# Patient Record
Sex: FEMALE | Race: WHITE | NOT HISPANIC OR LATINO | Employment: FULL TIME | URBAN - METROPOLITAN AREA
[De-identification: names, ages, dates, MRNs, and addresses within clinical notes are randomized per-mention and may not be internally consistent; named-entity substitution may affect disease eponyms.]

---

## 2017-01-18 ENCOUNTER — TRANSFERRED RECORDS (OUTPATIENT)
Dept: HEALTH INFORMATION MANAGEMENT | Facility: CLINIC | Age: 47
End: 2017-01-18

## 2017-03-21 ENCOUNTER — TRANSFERRED RECORDS (OUTPATIENT)
Dept: HEALTH INFORMATION MANAGEMENT | Facility: CLINIC | Age: 47
End: 2017-03-21

## 2017-04-19 ENCOUNTER — TRANSFERRED RECORDS (OUTPATIENT)
Dept: HEALTH INFORMATION MANAGEMENT | Facility: CLINIC | Age: 47
End: 2017-04-19

## 2017-08-03 ENCOUNTER — THERAPY VISIT (OUTPATIENT)
Dept: PHYSICAL THERAPY | Facility: CLINIC | Age: 47
End: 2017-08-03
Payer: COMMERCIAL

## 2017-08-03 DIAGNOSIS — Z47.89 AFTERCARE FOLLOWING SURGERY OF THE MUSCULOSKELETAL SYSTEM: ICD-10-CM

## 2017-08-03 DIAGNOSIS — M25.562 LEFT KNEE PAIN: Primary | ICD-10-CM

## 2017-08-03 PROCEDURE — 97110 THERAPEUTIC EXERCISES: CPT | Mod: GP | Performed by: PHYSICAL THERAPIST

## 2017-08-03 PROCEDURE — 97161 PT EVAL LOW COMPLEX 20 MIN: CPT | Mod: GP | Performed by: PHYSICAL THERAPIST

## 2017-08-03 ASSESSMENT — ACTIVITIES OF DAILY LIVING (ADL)
GIVING WAY, BUCKLING OR SHIFTING OF KNEE: THE SYMPTOM AFFECTS MY ACTIVITY MODERATELY
AS_A_RESULT_OF_YOUR_KNEE_INJURY,_HOW_WOULD_YOU_RATE_YOUR_CURRENT_LEVEL_OF_DAILY_ACTIVITY?: ABNORMAL
SWELLING: THE SYMPTOM AFFECTS MY ACTIVITY SLIGHTLY
KNEEL ON THE FRONT OF YOUR KNEE: ACTIVITY IS FAIRLY DIFFICULT
SIT WITH YOUR KNEE BENT: ACTIVITY IS FAIRLY DIFFICULT
WALK: ACTIVITY IS SOMEWHAT DIFFICULT
LIMPING: THE SYMPTOM AFFECTS MY ACTIVITY MODERATELY
PAIN: THE SYMPTOM AFFECTS MY ACTIVITY MODERATELY
GO DOWN STAIRS: ACTIVITY IS SOMEWHAT DIFFICULT
KNEE_ACTIVITY_OF_DAILY_LIVING_SCORE: 50
HOW_WOULD_YOU_RATE_THE_CURRENT_FUNCTION_OF_YOUR_KNEE_DURING_YOUR_USUAL_DAILY_ACTIVITIES_ON_A_SCALE_FROM_0_TO_100_WITH_100_BEING_YOUR_LEVEL_OF_KNEE_FUNCTION_PRIOR_TO_YOUR_INJURY_AND_0_BEING_THE_INABILITY_TO_PERFORM_ANY_OF_YOUR_USUAL_DAILY_ACTIVITIES?: 60
STAND: ACTIVITY IS MINIMALLY DIFFICULT
GO UP STAIRS: ACTIVITY IS SOMEWHAT DIFFICULT
RISE FROM A CHAIR: ACTIVITY IS SOMEWHAT DIFFICULT
WEAKNESS: THE SYMPTOM AFFECTS MY ACTIVITY MODERATELY
STIFFNESS: THE SYMPTOM AFFECTS MY ACTIVITY MODERATELY
RAW_SCORE: 35
HOW_WOULD_YOU_RATE_THE_OVERALL_FUNCTION_OF_YOUR_KNEE_DURING_YOUR_USUAL_DAILY_ACTIVITIES?: ABNORMAL
SQUAT: ACTIVITY IS FAIRLY DIFFICULT
KNEE_ACTIVITY_OF_DAILY_LIVING_SUM: 35

## 2017-08-03 NOTE — PROGRESS NOTES
Subjective:    Patient is a 47 year old female presenting with rehab left ankle/foot hpi.                                      Pertinent medical history includes:  Smoking.  Medical allergies: Zithromax.    Current medications:  Anti-depressants.  Current occupation is Student nutrition.    Primary job tasks include:  Prolonged standing and lifting (carrying, pushing, pulling).                                Objective:    System    Physical Exam    General     ROS    Assessment/Plan:

## 2017-08-03 NOTE — LETTER
"[facility]  [address]  [facility] [address]  [phone]    August 3, 2017    Re: Kacy Herrera   :   1970  MRN:  [id number]   REFERRING PHYSICIAN:   Josh Moseley    [facility]  Date of Initial Evaluation:  8/3/2017  Visits:  Rxs Used: 1  Reason for Referral:     Left knee pain  Aftercare following surgery of the musculoskeletal system    EVALUATION SUMMARY  Subjective:  Patient is a 47 year old female presenting with rehab left knee hpi. The history is provided by the patient. No  was used.   Kacy Herrera is a 47 year old female with a left knee condition.  Condition occurred with:  Insidious onset.  Condition occurred: for unknown reasons.  This is a new condition  S/P Left scope on 2017 for lateral meniscus debridement.  S/P medial compartment knee replacement a year ago.  Knee pain never resolved following replacement.  \"I haven't slept in about a year\".  Patient c/o impaired ambulation on levels and stairs .    Patient reports pain:  Anterior and medial.    Pain is described as burning (throbbing) and is constant and reported as 6/10.  Associated symptoms:  Loss of motion/stiffness, loss of strength and edema. Pain is the same all the time.  Symptoms are exacerbated by activity and relieved by ice and rest.  Since onset symptoms are unchanged.  Special tests:  CT scan and x-ray (prior to surgery).  Previous treatment includes physical therapy (following replacement).  There was moderate improvement following previous treatment.  General health as reported by patient is good.  Pertinent medical history includes:  Smoking.  Medical allergies: yes.  Other surgeries include:  Orthopedic surgery.  Current medications:  Anti-depressants.  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  Primary job tasks include:  Prolonged standing.  Barriers include:  None " as reported by the patient.  Red flags:  None as reported by the patient.  Pertinent medical history includes:  Smoking.  Medical allergies: Zithromax.    Current medications:  Anti-depressants.  Current occupation is Student nutrition.    Primary job tasks include:  Prolonged standing and lifting (carrying, pushing, pulling)    Objective:  Gait:    Gait Type:  Antalgic   Assistive Devices:  None  Deviations:  Knee:  Knee flexion decr L and knee extension decr L               Re: Kacy Herrera   :   1970         Knee Evaluation:  ROM:    AROM  Extension: Left: 10    Right:   Flexion: Left: 85   Right:    PROM  Extension: Left: <5   Right:   Flexion: Left: 95   Right:     Strength:   Extension:  Left: 4-/5    Pain:+        Flexion:  Left: 4-/5    Pain:+/-        Quad Set Left: Fair    Pain:     Edema:  Edema of the knee: 2+/5.  Assessment/Plan:    Patient is a 47 year old female with left side knee complaints.    Patient has the following significant findings with corresponding treatment plan.                Diagnosis 1:  S/P Knee Scope - Previous partial knee replacement  Pain -  self management, education and home program  Decreased ROM/flexibility - therapeutic exercise, therapeutic activity and home program  Decreased strength - therapeutic exercise, therapeutic activities and home program  Edema - cold therapy and self management/home program  Impaired gait - gait training and home program  Impaired muscle performance - neuro re-education and home program  Decreased function - therapeutic activities and home program    Therapy Evaluation Codes:   1) History comprised of:   Personal factors that impact the plan of care:      None.    Comorbidity factors that impact the plan of care are:      Smoking.     Medications impacting care: Anti-depressant.  2) Examination of Body Systems comprised of:   Body structures and functions that impact the plan of care:      Knee.   Activity limitations that impact  the plan of care are:      Lifting, Squatting/kneeling, Stairs, Standing, Walking and Working.  3) Clinical presentation characteristics are:   Stable/Uncomplicated.  4) Decision-Making    Low complexity using standardized patient assessment instrument and/or measureable assessment of functional outcome.  Cumulative Therapy Evaluation is: Low complexity.    Previous and current functional limitations:  (See Goal Flow Sheet for this information)    Short term and Long term goals: (See Goal Flow Sheet for this information)     Re: Kacy Herrera   :   1970    Communication ability:  Patient appears to be able to clearly communicate and understand verbal and written communication and follow directions correctly.  Treatment Explanation - The following has been discussed with the patient:   RX ordered/plan of care  Anticipated outcomes  Possible risks and side effects  This patient would benefit from PT intervention to resume normal activities.   Rehab potential is good.    Frequency:  2 X week, once daily  Duration:  for 2 weeks tapering to 1 X a week over 6 weeks  Discharge Plan:  Achieve all LTG.  Independent in home treatment program.  Reach maximal therapeutic benefit.                Thank you for your referral.    INQUIRIES  Therapist: Sanket Samuel, PT   [facility] [place]  [address]  [facility] [address]  Phone: [phone]  Fax: [phone]

## 2017-08-03 NOTE — PROGRESS NOTES
"Subjective:    Patient is a 47 year old female presenting with rehab left knee hpi. The history is provided by the patient. No  was used.   Kacy Herrera is a 47 year old female with a left knee condition.  Condition occurred with:  Insidious onset.  Condition occurred: for unknown reasons.  This is a new condition  S/P Left scope on 7/26/2017 for lateral meniscus debridement.  S/P medial compartment knee replacement a year ago.  Knee pain never resolved following replacement.  \"I haven't slept in about a year\".  Patient c/o impaired ambulation on levels and stairs .    Patient reports pain:  Anterior and medial.    Pain is described as burning (throbbing) and is constant and reported as 6/10.  Associated symptoms:  Loss of motion/stiffness, loss of strength and edema. Pain is the same all the time.  Symptoms are exacerbated by activity and relieved by ice and rest.  Since onset symptoms are unchanged.  Special tests:  CT scan and x-ray (prior to surgery).  Previous treatment includes physical therapy (following replacement).  There was moderate improvement following previous treatment.  General health as reported by patient is good.  Pertinent medical history includes:  Smoking.  Medical allergies: yes.  Other surgeries include:  Orthopedic surgery.  Current medications:  Anti-depressants.  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  Primary job tasks include:  Prolonged standing.    Barriers include:  None as reported by the patient.    Red flags:  None as reported by the patient.                        Objective:      Gait:    Gait Type:  Antalgic   Assistive Devices:  None  Deviations:  Knee:  Knee flexion decr L and knee extension decr L                                                      Knee Evaluation:  ROM:    AROM      Extension: Left: 10    Right:   Flexion: Left: 85   Right:  PROM      Extension: Left: <5   Right:   Flexion: " Left: 95   Right:       Strength:     Extension:  Left: 4-/5    Pain:+        Flexion:  Left: 4-/5    Pain:+/-        Quad Set Left: Fair    Pain:           Edema:  Edema of the knee: 2+/5.            General     ROS    Assessment/Plan:      Patient is a 47 year old female with left side knee complaints.    Patient has the following significant findings with corresponding treatment plan.                Diagnosis 1:  S/P Knee Scope - Previous partial knee replacement  Pain -  self management, education and home program  Decreased ROM/flexibility - therapeutic exercise, therapeutic activity and home program  Decreased strength - therapeutic exercise, therapeutic activities and home program  Edema - cold therapy and self management/home program  Impaired gait - gait training and home program  Impaired muscle performance - neuro re-education and home program  Decreased function - therapeutic activities and home program    Therapy Evaluation Codes:   1) History comprised of:   Personal factors that impact the plan of care:      None.    Comorbidity factors that impact the plan of care are:      Smoking.     Medications impacting care: Anti-depressant.  2) Examination of Body Systems comprised of:   Body structures and functions that impact the plan of care:      Knee.   Activity limitations that impact the plan of care are:      Lifting, Squatting/kneeling, Stairs, Standing, Walking and Working.  3) Clinical presentation characteristics are:   Stable/Uncomplicated.  4) Decision-Making    Low complexity using standardized patient assessment instrument and/or measureable assessment of functional outcome.  Cumulative Therapy Evaluation is: Low complexity.    Previous and current functional limitations:  (See Goal Flow Sheet for this information)    Short term and Long term goals: (See Goal Flow Sheet for this information)     Communication ability:  Patient appears to be able to clearly communicate and understand verbal and  written communication and follow directions correctly.  Treatment Explanation - The following has been discussed with the patient:   RX ordered/plan of care  Anticipated outcomes  Possible risks and side effects  This patient would benefit from PT intervention to resume normal activities.   Rehab potential is good.    Frequency:  2 X week, once daily  Duration:  for 2 weeks tapering to 1 X a week over 6 weeks  Discharge Plan:  Achieve all LTG.  Independent in home treatment program.  Reach maximal therapeutic benefit.    Please refer to the daily flowsheet for treatment today, total treatment time and time spent performing 1:1 timed codes.

## 2017-08-08 ENCOUNTER — THERAPY VISIT (OUTPATIENT)
Dept: PHYSICAL THERAPY | Facility: CLINIC | Age: 47
End: 2017-08-08
Payer: COMMERCIAL

## 2017-08-08 DIAGNOSIS — M25.562 LEFT KNEE PAIN: ICD-10-CM

## 2017-08-08 DIAGNOSIS — Z47.89 AFTERCARE FOLLOWING SURGERY OF THE MUSCULOSKELETAL SYSTEM: ICD-10-CM

## 2017-08-08 PROCEDURE — 97010 HOT OR COLD PACKS THERAPY: CPT | Mod: GP | Performed by: PHYSICAL THERAPIST

## 2017-08-08 PROCEDURE — 97110 THERAPEUTIC EXERCISES: CPT | Mod: GP | Performed by: PHYSICAL THERAPIST

## 2017-08-17 ENCOUNTER — THERAPY VISIT (OUTPATIENT)
Dept: PHYSICAL THERAPY | Facility: CLINIC | Age: 47
End: 2017-08-17
Payer: COMMERCIAL

## 2017-08-17 DIAGNOSIS — M25.562 LEFT KNEE PAIN: ICD-10-CM

## 2017-08-17 DIAGNOSIS — Z47.89 AFTERCARE FOLLOWING SURGERY OF THE MUSCULOSKELETAL SYSTEM: ICD-10-CM

## 2017-08-17 PROCEDURE — 97110 THERAPEUTIC EXERCISES: CPT | Mod: GP

## 2017-08-17 PROCEDURE — 97010 HOT OR COLD PACKS THERAPY: CPT | Mod: GP

## 2017-08-21 ENCOUNTER — THERAPY VISIT (OUTPATIENT)
Dept: PHYSICAL THERAPY | Facility: CLINIC | Age: 47
End: 2017-08-21
Payer: COMMERCIAL

## 2017-08-21 DIAGNOSIS — Z47.89 AFTERCARE FOLLOWING SURGERY OF THE MUSCULOSKELETAL SYSTEM: ICD-10-CM

## 2017-08-21 DIAGNOSIS — M25.562 LEFT KNEE PAIN: ICD-10-CM

## 2017-08-21 PROCEDURE — 97110 THERAPEUTIC EXERCISES: CPT | Mod: GP

## 2017-08-21 PROCEDURE — 97010 HOT OR COLD PACKS THERAPY: CPT | Mod: GP

## 2017-08-23 ENCOUNTER — THERAPY VISIT (OUTPATIENT)
Dept: PHYSICAL THERAPY | Facility: CLINIC | Age: 47
End: 2017-08-23
Payer: COMMERCIAL

## 2017-08-23 DIAGNOSIS — Z47.89 AFTERCARE FOLLOWING SURGERY OF THE MUSCULOSKELETAL SYSTEM: ICD-10-CM

## 2017-08-23 DIAGNOSIS — M25.562 LEFT KNEE PAIN: ICD-10-CM

## 2017-08-23 PROCEDURE — 97010 HOT OR COLD PACKS THERAPY: CPT | Mod: GP | Performed by: PHYSICAL THERAPIST

## 2017-08-23 PROCEDURE — 97110 THERAPEUTIC EXERCISES: CPT | Mod: GP | Performed by: PHYSICAL THERAPIST

## 2017-08-28 ENCOUNTER — THERAPY VISIT (OUTPATIENT)
Dept: PHYSICAL THERAPY | Facility: CLINIC | Age: 47
End: 2017-08-28
Payer: COMMERCIAL

## 2017-08-28 DIAGNOSIS — Z47.89 AFTERCARE FOLLOWING SURGERY OF THE MUSCULOSKELETAL SYSTEM: ICD-10-CM

## 2017-08-28 DIAGNOSIS — M25.562 LEFT KNEE PAIN: ICD-10-CM

## 2017-08-28 PROCEDURE — 97110 THERAPEUTIC EXERCISES: CPT | Mod: GP

## 2017-08-28 PROCEDURE — 97010 HOT OR COLD PACKS THERAPY: CPT | Mod: GP

## 2017-08-31 ENCOUNTER — THERAPY VISIT (OUTPATIENT)
Dept: PHYSICAL THERAPY | Facility: CLINIC | Age: 47
End: 2017-08-31
Payer: COMMERCIAL

## 2017-08-31 DIAGNOSIS — M25.562 LEFT KNEE PAIN: ICD-10-CM

## 2017-08-31 DIAGNOSIS — Z47.89 AFTERCARE FOLLOWING SURGERY OF THE MUSCULOSKELETAL SYSTEM: ICD-10-CM

## 2017-08-31 PROCEDURE — 97110 THERAPEUTIC EXERCISES: CPT | Mod: GP | Performed by: PHYSICAL THERAPIST

## 2017-08-31 PROCEDURE — 97010 HOT OR COLD PACKS THERAPY: CPT | Mod: GP | Performed by: PHYSICAL THERAPIST

## 2017-08-31 NOTE — LETTER
[facility]  [address] [facility] [address] [phone]    2017    Re: Kacy Herrera   :   1970  MRN:  [id number]   REFERRING PHYSICIAN:   Josh Moseley    [facility]  Date of Initial Evaluation:  8/3/2017  Visits:  Rxs Used: 7  Reason for Referral:     Left knee pain  Aftercare following surgery of the musculoskeletal system    DISCHARGE REPORT  Progress reporting period is from 8/3/17 to 17 (7 visits).       SUBJECTIVE  Subjective changes noted by patient:  Kacy decided to discontinue PT following her last visit on 17 and would be pursuing a possible TKA.  Her status at that time: Subjective: Knee is the same.  Not having pain down the post thigh anymore, but continues to have LBP.  Doing press-ups as instructed.     Current pain level is NA  .     Previous pain level was  NA  .   Changes in function:  None  Adverse reaction to treatment or activity: None    OBJECTIVE  Changes noted in objective findings:    Objective: Knee pain with step downs.       ASSESSMENT/PLAN  Updated problem list and treatment plan: Diagnosis 1:  S/p left knee arthroscopic exploratory surgery    STG/LTGs have been met or progress has been made towards goals:  None  Assessment of Progress: The patient's condition is unchanged.  Self Management Plans:  Patient has been instructed in a home treatment program.  Kacy continues to require the following intervention to meet STG and LTG's:  PT intervention is no longer required to meet STG/LTG.    Recommendations:  This patient would benefit from further evaluation.  Discharge from PT.              Re: Kacy Herrera   FELICITY:   1970                                Thank you for your referral.    INQUIRIES  Therapist: Sinan Collazo, PT  [facility]  [address] [facility] [address]  Phone: [phone]  Fax: [phone]

## 2017-10-13 ENCOUNTER — RECORDS - HEALTHEAST (OUTPATIENT)
Dept: ADMINISTRATIVE | Facility: OTHER | Age: 47
End: 2017-10-13

## 2017-10-26 ASSESSMENT — MIFFLIN-ST. JEOR: SCORE: 1334.36

## 2017-10-31 ENCOUNTER — ANESTHESIA - HEALTHEAST (OUTPATIENT)
Dept: SURGERY | Facility: CLINIC | Age: 47
End: 2017-10-31

## 2017-11-01 ENCOUNTER — SURGERY - HEALTHEAST (OUTPATIENT)
Dept: SURGERY | Facility: CLINIC | Age: 47
End: 2017-11-01

## 2017-11-01 ENCOUNTER — TRANSFERRED RECORDS (OUTPATIENT)
Dept: HEALTH INFORMATION MANAGEMENT | Facility: CLINIC | Age: 47
End: 2017-11-01

## 2017-11-01 ASSESSMENT — MIFFLIN-ST. JEOR
SCORE: 1361.58
SCORE: 1365.21

## 2017-11-06 ENCOUNTER — THERAPY VISIT (OUTPATIENT)
Dept: PHYSICAL THERAPY | Facility: CLINIC | Age: 47
End: 2017-11-06
Payer: COMMERCIAL

## 2017-11-06 DIAGNOSIS — Z96.652 PRESENCE OF LEFT ARTIFICIAL KNEE JOINT: ICD-10-CM

## 2017-11-06 DIAGNOSIS — Z47.1 AFTERCARE FOLLOWING LEFT KNEE JOINT REPLACEMENT SURGERY: Primary | ICD-10-CM

## 2017-11-06 DIAGNOSIS — Z96.652 AFTERCARE FOLLOWING LEFT KNEE JOINT REPLACEMENT SURGERY: Primary | ICD-10-CM

## 2017-11-06 PROCEDURE — 97161 PT EVAL LOW COMPLEX 20 MIN: CPT | Mod: GP | Performed by: PHYSICAL THERAPIST

## 2017-11-06 PROCEDURE — 97010 HOT OR COLD PACKS THERAPY: CPT | Mod: GP | Performed by: PHYSICAL THERAPIST

## 2017-11-06 PROCEDURE — 97110 THERAPEUTIC EXERCISES: CPT | Mod: GP | Performed by: PHYSICAL THERAPIST

## 2017-11-06 ASSESSMENT — ACTIVITIES OF DAILY LIVING (ADL)
HOW_WOULD_YOU_RATE_THE_OVERALL_FUNCTION_OF_YOUR_KNEE_DURING_YOUR_USUAL_DAILY_ACTIVITIES?: ABNORMAL
KNEE_ACTIVITY_OF_DAILY_LIVING_SCORE: 8.57
LIMPING: THE SYMPTOM PREVENTS ME FROM ALL DAILY ACTIVITIES
SQUAT: I AM UNABLE TO DO THE ACTIVITY
SWELLING: THE SYMPTOM PREVENTS ME FROM ALL DAILY ACTIVITIES
WALK: ACTIVITY IS VERY DIFFICULT
STIFFNESS: THE SYMPTOM PREVENTS ME FROM ALL DAILY ACTIVITIES
GO DOWN STAIRS: I AM UNABLE TO DO THE ACTIVITY
RAW_SCORE: 6
GO UP STAIRS: I AM UNABLE TO DO THE ACTIVITY
STAND: ACTIVITY IS VERY DIFFICULT
AS_A_RESULT_OF_YOUR_KNEE_INJURY,_HOW_WOULD_YOU_RATE_YOUR_CURRENT_LEVEL_OF_DAILY_ACTIVITY?: SEVERELY ABNORMAL
KNEE_ACTIVITY_OF_DAILY_LIVING_SUM: 6
RISE FROM A CHAIR: ACTIVITY IS VERY DIFFICULT
PAIN: THE SYMPTOM PREVENTS ME FROM ALL DAILY ACTIVITIES
SIT WITH YOUR KNEE BENT: ACTIVITY IS VERY DIFFICULT
KNEEL ON THE FRONT OF YOUR KNEE: ACTIVITY IS VERY DIFFICULT
GIVING WAY, BUCKLING OR SHIFTING OF KNEE: THE SYMPTOM AFFECTS MY ACTIVITY SEVERELY
WEAKNESS: THE SYMPTOM PREVENTS ME FROM ALL DAILY ACTIVITIES
HOW_WOULD_YOU_RATE_THE_CURRENT_FUNCTION_OF_YOUR_KNEE_DURING_YOUR_USUAL_DAILY_ACTIVITIES_ON_A_SCALE_FROM_0_TO_100_WITH_100_BEING_YOUR_LEVEL_OF_KNEE_FUNCTION_PRIOR_TO_YOUR_INJURY_AND_0_BEING_THE_INABILITY_TO_PERFORM_ANY_OF_YOUR_USUAL_DAILY_ACTIVITIES?: 95

## 2017-11-06 NOTE — PROGRESS NOTES
Subjective:    Patient is a 47 year old female presenting with rehab left knee hpi.           Pt presents to PT s/p left TKA.  Surgical date of 11/1/17.  Walking with crutches and PWB.  Hx of failed left partial knee replacement..    Patient reports pain:  Posterior, anterior, medial and lateral.    Pain is described as aching and stabbing and is constant and reported as 9/10.  Associated symptoms:  Loss of motion/stiffness, loss of strength and edema. Pain is the same all the time.  Symptoms are exacerbated by activity and transfers and relieved by rest and ice.  Since onset symptoms are unchanged.  Special tests:  MRI and x-ray.  Previous treatment includes physical therapy.  There was no improvement following previous treatment.  General health as reported by patient is good.                  Barriers include:  None as reported by the patient.    Red flags:  None as reported by the patient.                        Objective:    System                                                Knee Evaluation:  ROM:      PROM    Hyperextension: Left: 0    Right:  3  Extension: Left: 10    Right:  0  Flexion: Left: 60    Right:  140      Strength:         Quad Set Left: Poor    Pain:           Edema:  Edema of the knee: Normal level of edema following a TKA.            General     ROS    Assessment/Plan:      Patient is a 47 year old female with left side knee complaints.    Patient has the following significant findings with corresponding treatment plan.                Diagnosis 1:  Left TKA  Pain -  hot/cold therapy, education and home program  Decreased ROM/flexibility - manual therapy, therapeutic exercise and home program  Decreased strength - therapeutic exercise, therapeutic activities and home program    Therapy Evaluation Codes:   1) History comprised of:   Personal factors that impact the plan of care:      None.    Comorbidity factors that impact the plan of care are:      None.     Medications impacting care:  None.  2) Examination of Body Systems comprised of:   Body structures and functions that impact the plan of care:      Knee.   Activity limitations that impact the plan of care are:      Driving, Dressing, Sitting, Squatting/kneeling, Stairs and Walking.  3) Clinical presentation characteristics are:   Stable/Uncomplicated.  4) Decision-Making    Low complexity using standardized patient assessment instrument and/or measureable assessment of functional outcome.  Cumulative Therapy Evaluation is: Low complexity.    Previous and current functional limitations:  (See Goal Flow Sheet for this information)    Short term and Long term goals: (See Goal Flow Sheet for this information)     Communication ability:  Patient appears to be able to clearly communicate and understand verbal and written communication and follow directions correctly.  Treatment Explanation - The following has been discussed with the patient:   RX ordered/plan of care  Anticipated outcomes  Possible risks and side effects  This patient would benefit from PT intervention to resume normal activities.   Rehab potential is good.    Frequency:  4 X week, once daily  Duration:  for 2 weeks tapering to 2 X a week over 4 weeks  Discharge Plan:  Achieve all LTG.  Independent in home treatment program.  Reach maximal therapeutic benefit.    Please refer to the daily flowsheet for treatment today, total treatment time and time spent performing 1:1 timed codes.

## 2017-11-06 NOTE — LETTER
[facility]  [address]  [facility] [address]  [phone]    2017    Re: Kacy HESS Javier BLEVINS:   1970  MRN:  [id number]   REFERRING PHYSICIAN:   Josh Moseley    [facility]  Date of Initial Evaluation:  2017  Visits:  Rxs Used: 1  Reason for Referral:     Aftercare following left knee joint replacement surgery  Presence of left artificial knee joint    EVALUATION SUMMARY    Subjective:  Patient is a 47 year old female presenting with rehab left knee hpi.   Pt presents to PT s/p left TKA.  Surgical date of 17.  Walking with crutches and PWB.  Hx of failed left partial knee replacement..    Patient reports pain:  Posterior, anterior, medial and lateral.    Pain is described as aching and stabbing and is constant and reported as 9/10.  Associated symptoms:  Loss of motion/stiffness, loss of strength and edema. Pain is the same all the time.  Symptoms are exacerbated by activity and transfers and relieved by rest and ice.  Since onset symptoms are unchanged.  Special tests:  MRI and x-ray.  Previous treatment includes physical therapy.  There was no improvement following previous treatment.  General health as reported by patient is good.                Barriers include:  None as reported by the patient.  Red flags:  None as reported by the patient.    Objective:  Knee Evaluation:  ROM:    PROM  Hyperextension: Left: 0    Right:  3  Extension: Left: 10    Right:  0  Flexion: Left: 60    Right:  140    Strength:   Quad Set Left: Poor    Pain:     Edema:  Edema of the knee: Normal level of edema following a TKA.                  Re: Kacy BLEVINS:   1970    Assessment/Plan:    Patient is a 47 year old female with left side knee complaints.    Patient has the following significant findings with corresponding treatment plan.                Diagnosis 1:  Left TKA  Pain -  hot/cold therapy, education and home  program  Decreased ROM/flexibility - manual therapy, therapeutic exercise and home program  Decreased strength - therapeutic exercise, therapeutic activities and home program    Therapy Evaluation Codes:   1) History comprised of:   Personal factors that impact the plan of care:      None.    Comorbidity factors that impact the plan of care are:      None.     Medications impacting care: None.  2) Examination of Body Systems comprised of:   Body structures and functions that impact the plan of care:      Knee.   Activity limitations that impact the plan of care are:      Driving, Dressing, Sitting, Squatting/kneeling, Stairs and Walking.  3) Clinical presentation characteristics are:   Stable/Uncomplicated.  4) Decision-Making    Low complexity using standardized patient assessment instrument and/or measureable assessment of functional outcome.  Cumulative Therapy Evaluation is: Low complexity.    Previous and current functional limitations:  (See Goal Flow Sheet for this information)    Short term and Long term goals: (See Goal Flow Sheet for this information)   Communication ability:  Patient appears to be able to clearly communicate and understand verbal and written communication and follow directions correctly.  Treatment Explanation - The following has been discussed with the patient:   RX ordered/plan of care  Anticipated outcomes  Possible risks and side effects  This patient would benefit from PT intervention to resume normal activities.   Rehab potential is good.  Frequency:  4 X week, once daily  Duration:  for 2 weeks tapering to 2 X a week over 4 weeks  Discharge Plan:  Achieve all LTG.  Independent in home treatment program.  Reach maximal therapeutic benefit.    Thank you for your referral.    INQUIRIES  Therapist: Sinan Collazo, PT  [facility] [place]  [address]  [facility] [address]  Phone: [phone]  Fax: [phone]

## 2017-11-07 ENCOUNTER — THERAPY VISIT (OUTPATIENT)
Dept: PHYSICAL THERAPY | Facility: CLINIC | Age: 47
End: 2017-11-07
Payer: COMMERCIAL

## 2017-11-07 DIAGNOSIS — Z47.1 AFTERCARE FOLLOWING LEFT KNEE JOINT REPLACEMENT SURGERY: ICD-10-CM

## 2017-11-07 DIAGNOSIS — Z96.652 AFTERCARE FOLLOWING LEFT KNEE JOINT REPLACEMENT SURGERY: ICD-10-CM

## 2017-11-07 DIAGNOSIS — Z96.652 PRESENCE OF LEFT ARTIFICIAL KNEE JOINT: ICD-10-CM

## 2017-11-07 PROCEDURE — 97010 HOT OR COLD PACKS THERAPY: CPT | Mod: GP | Performed by: PHYSICAL THERAPIST

## 2017-11-07 PROCEDURE — 97110 THERAPEUTIC EXERCISES: CPT | Mod: GP | Performed by: PHYSICAL THERAPIST

## 2017-11-09 ENCOUNTER — THERAPY VISIT (OUTPATIENT)
Dept: PHYSICAL THERAPY | Facility: CLINIC | Age: 47
End: 2017-11-09
Payer: COMMERCIAL

## 2017-11-09 DIAGNOSIS — Z96.652 PRESENCE OF LEFT ARTIFICIAL KNEE JOINT: ICD-10-CM

## 2017-11-09 DIAGNOSIS — Z96.652 AFTERCARE FOLLOWING LEFT KNEE JOINT REPLACEMENT SURGERY: ICD-10-CM

## 2017-11-09 DIAGNOSIS — Z47.1 AFTERCARE FOLLOWING LEFT KNEE JOINT REPLACEMENT SURGERY: ICD-10-CM

## 2017-11-09 PROCEDURE — 97110 THERAPEUTIC EXERCISES: CPT | Mod: GP | Performed by: PHYSICAL THERAPIST

## 2017-11-09 PROCEDURE — 97010 HOT OR COLD PACKS THERAPY: CPT | Mod: GP | Performed by: PHYSICAL THERAPIST

## 2017-11-10 ENCOUNTER — THERAPY VISIT (OUTPATIENT)
Dept: PHYSICAL THERAPY | Facility: CLINIC | Age: 47
End: 2017-11-10
Payer: COMMERCIAL

## 2017-11-10 DIAGNOSIS — Z47.1 AFTERCARE FOLLOWING LEFT KNEE JOINT REPLACEMENT SURGERY: ICD-10-CM

## 2017-11-10 DIAGNOSIS — Z96.652 PRESENCE OF LEFT ARTIFICIAL KNEE JOINT: ICD-10-CM

## 2017-11-10 DIAGNOSIS — Z96.652 AFTERCARE FOLLOWING LEFT KNEE JOINT REPLACEMENT SURGERY: ICD-10-CM

## 2017-11-10 PROCEDURE — 97010 HOT OR COLD PACKS THERAPY: CPT | Mod: GP | Performed by: PHYSICAL THERAPIST

## 2017-11-10 PROCEDURE — 97110 THERAPEUTIC EXERCISES: CPT | Mod: GP | Performed by: PHYSICAL THERAPIST

## 2017-11-13 ENCOUNTER — THERAPY VISIT (OUTPATIENT)
Dept: PHYSICAL THERAPY | Facility: CLINIC | Age: 47
End: 2017-11-13
Payer: COMMERCIAL

## 2017-11-13 DIAGNOSIS — Z96.652 AFTERCARE FOLLOWING LEFT KNEE JOINT REPLACEMENT SURGERY: ICD-10-CM

## 2017-11-13 DIAGNOSIS — Z96.652 PRESENCE OF LEFT ARTIFICIAL KNEE JOINT: ICD-10-CM

## 2017-11-13 DIAGNOSIS — Z47.1 AFTERCARE FOLLOWING LEFT KNEE JOINT REPLACEMENT SURGERY: ICD-10-CM

## 2017-11-13 PROCEDURE — 97010 HOT OR COLD PACKS THERAPY: CPT | Mod: GP | Performed by: PHYSICAL THERAPIST

## 2017-11-13 PROCEDURE — 97530 THERAPEUTIC ACTIVITIES: CPT | Mod: GP | Performed by: PHYSICAL THERAPIST

## 2017-11-13 PROCEDURE — 97110 THERAPEUTIC EXERCISES: CPT | Mod: GP | Performed by: PHYSICAL THERAPIST

## 2017-11-14 ENCOUNTER — THERAPY VISIT (OUTPATIENT)
Dept: PHYSICAL THERAPY | Facility: CLINIC | Age: 47
End: 2017-11-14
Payer: COMMERCIAL

## 2017-11-14 DIAGNOSIS — Z96.652 PRESENCE OF LEFT ARTIFICIAL KNEE JOINT: ICD-10-CM

## 2017-11-14 DIAGNOSIS — Z47.1 AFTERCARE FOLLOWING LEFT KNEE JOINT REPLACEMENT SURGERY: ICD-10-CM

## 2017-11-14 DIAGNOSIS — Z96.652 AFTERCARE FOLLOWING LEFT KNEE JOINT REPLACEMENT SURGERY: ICD-10-CM

## 2017-11-14 PROCEDURE — 97010 HOT OR COLD PACKS THERAPY: CPT | Mod: GP | Performed by: PHYSICAL THERAPIST

## 2017-11-14 PROCEDURE — 97110 THERAPEUTIC EXERCISES: CPT | Mod: GP | Performed by: PHYSICAL THERAPIST

## 2017-11-14 PROCEDURE — 97530 THERAPEUTIC ACTIVITIES: CPT | Mod: GP | Performed by: PHYSICAL THERAPIST

## 2017-11-16 ENCOUNTER — THERAPY VISIT (OUTPATIENT)
Dept: PHYSICAL THERAPY | Facility: CLINIC | Age: 47
End: 2017-11-16
Payer: COMMERCIAL

## 2017-11-16 DIAGNOSIS — Z96.652 PRESENCE OF LEFT ARTIFICIAL KNEE JOINT: ICD-10-CM

## 2017-11-16 DIAGNOSIS — Z47.1 AFTERCARE FOLLOWING LEFT KNEE JOINT REPLACEMENT SURGERY: ICD-10-CM

## 2017-11-16 DIAGNOSIS — Z96.652 AFTERCARE FOLLOWING LEFT KNEE JOINT REPLACEMENT SURGERY: ICD-10-CM

## 2017-11-16 PROCEDURE — 97110 THERAPEUTIC EXERCISES: CPT | Mod: GP

## 2017-11-16 PROCEDURE — 97010 HOT OR COLD PACKS THERAPY: CPT | Mod: GP

## 2017-11-16 PROCEDURE — 97530 THERAPEUTIC ACTIVITIES: CPT | Mod: GP

## 2017-11-17 ENCOUNTER — THERAPY VISIT (OUTPATIENT)
Dept: PHYSICAL THERAPY | Facility: CLINIC | Age: 47
End: 2017-11-17
Payer: COMMERCIAL

## 2017-11-17 DIAGNOSIS — Z96.652 PRESENCE OF LEFT ARTIFICIAL KNEE JOINT: ICD-10-CM

## 2017-11-17 DIAGNOSIS — Z47.1 AFTERCARE FOLLOWING LEFT KNEE JOINT REPLACEMENT SURGERY: ICD-10-CM

## 2017-11-17 DIAGNOSIS — Z96.652 AFTERCARE FOLLOWING LEFT KNEE JOINT REPLACEMENT SURGERY: ICD-10-CM

## 2017-11-17 PROCEDURE — 97110 THERAPEUTIC EXERCISES: CPT | Mod: GP | Performed by: PHYSICAL THERAPIST

## 2017-11-17 PROCEDURE — 97530 THERAPEUTIC ACTIVITIES: CPT | Mod: GP | Performed by: PHYSICAL THERAPIST

## 2017-11-17 PROCEDURE — 97010 HOT OR COLD PACKS THERAPY: CPT | Mod: GP | Performed by: PHYSICAL THERAPIST

## 2017-11-20 ENCOUNTER — THERAPY VISIT (OUTPATIENT)
Dept: PHYSICAL THERAPY | Facility: CLINIC | Age: 47
End: 2017-11-20
Payer: COMMERCIAL

## 2017-11-20 DIAGNOSIS — Z96.652 PRESENCE OF LEFT ARTIFICIAL KNEE JOINT: ICD-10-CM

## 2017-11-20 DIAGNOSIS — Z47.1 AFTERCARE FOLLOWING LEFT KNEE JOINT REPLACEMENT SURGERY: ICD-10-CM

## 2017-11-20 DIAGNOSIS — Z96.652 AFTERCARE FOLLOWING LEFT KNEE JOINT REPLACEMENT SURGERY: ICD-10-CM

## 2017-11-20 PROCEDURE — 97110 THERAPEUTIC EXERCISES: CPT | Mod: GP

## 2017-11-20 PROCEDURE — 97530 THERAPEUTIC ACTIVITIES: CPT | Mod: GP

## 2017-11-20 PROCEDURE — 97010 HOT OR COLD PACKS THERAPY: CPT | Mod: GP

## 2017-11-20 NOTE — MR AVS SNAPSHOT
After Visit Summary   11/20/2017    Kacy Herrera    MRN: [id number]           Patient Information     Date Of Birth          1970        Visit Information        Provider Department      11/20/2017 9:30 AM Yelitza Jones PTA [facility]        Today's Diagnoses     Aftercare following left knee joint replacement surgery        Presence of left artificial knee joint           Follow-ups after your visit        Your next 10 appointments already scheduled     Nov 22, 2017  4:40 PM CST   GUERO Extremity with Sinan Collazo PT   [facility] ([facility])    [address] [address]   [phone]            Nov 24, 2017 11:30 AM CST   GUERO Extremity with Sinan Collazo PT   [facility] ([facility])    [address] [address]   [phone]            Nov 27, 2017  9:30 AM CST   GUERO Extremity with Sinan Collazo PT   [facility] ([facility])    [address] [address]   [phone]            Nov 29, 2017 10:50 AM CST   GUERO Extremity with Sinan Collazo PT   [facility] ([facility])    [address] [address]   [phone]            Dec 01, 2017  9:30 AM CST   GUERO Extremity with Sinan Collazo PT   [facility] ([facility])    [address] [address]   [phone]              Who to contact     If you have questions or need follow up information about today's clinic visit or your schedule please contact [facility] directly at [phone].  Normal or non-critical lab and imaging results will be communicated to you by MyChart, letter or phone within 4 business days after the clinic has received the results. If you do not hear from us within 7 days, please contact the clinic through MyChart or phone. If you  "have a critical or abnormal lab result, we will notify you by phone as soon as possible.  Submit refill requests through [email or web address] or call your pharmacy and they will forward the refill request to us. Please allow 3 business days for your refill to be completed.          Additional Information About Your Visit        Appwapphart Information     [email or web address] lets you send messages to your doctor, view your test results, renew your prescriptions, schedule appointments and more. To sign up, go to [email or web address] . Click on \"Log in\" on the left side of the screen, which will take you to the Welcome page. Then click on \"Sign up Now\" on the right side of the page.     You will be asked to enter the access code listed below, as well as some personal information. Please follow the directions to create your username and password.     Your access code is: [email or web address]  Expires: 2018  3:21 PM     Your access code will  in 90 days. If you need help or a new code, please call your [facility] clinic or [phone].        Care EveryWhere ID     This is your Care EveryWhere ID. This could be used by other organizations to access your [facility] medical records  [id number]         Blood Pressure from Last 3 Encounters:   16 (!) 152/92    Weight from Last 3 Encounters:   No data found for Wt              We Performed the Following     Hot or Cold Packs Therapy     Therapeutic Activities     Therapeutic Exercises        Primary Care Provider Fax #    [facility] [phone] [address]  [facility] [address]        Equal Access to Services     FRANSICO KIM : Hadii aad ku hadasho Soomaali, waaxda luqadaha, qaybta kaalmada adeegyada, waxay missy villeda. So [facility] [phone].    ATENCIÓN: Si habla español, tiene a de león disposición servicios gratuitos de asistencia lingüística. Llame al [phone].    We comply with applicable federal civil rights laws and [place] laws. " We do not discriminate on the basis of race, color, national origin, age, disability, sex, sexual orientation, or gender identity.            Thank you!     Thank you for choosing [facility]  for your care. Our goal is always to provide you with excellent care. Hearing back from our patients is one way we can continue to improve our services. Please take a few minutes to complete the written survey that you may receive in the mail after your visit with us. Thank you!             Your Updated Medication List - Protect others around you: Learn how to safely use, store and throw away your medicines at www.disposemymeds.org.          This list is accurate as of: 11/20/17 11:54 AM.  Always use your most recent med list.                   Brand Name Dispense Instructions for use Diagnosis    CONCERTA PO      Take by mouth daily        HYDROcodone-acetaminophen 5-325 MG per tablet    NORCO    15 tablet    Take 1-2 tablets by mouth every 4 hours as needed for moderate to severe pain

## 2017-11-20 NOTE — PROGRESS NOTES
Subjective:    HPI                    Objective:    System    Physical Exam    General     ROS    Assessment/Plan:      PROGRESS  REPORT    Progress reporting period is from 11/6/2017 to 11/20/2017.       SUBJECTIVE  Subjective changes noted by patient:  States she has been having significant pain.  She was able to go out shopping for an hour this past weekend.  Performing stairs one at a time.      Changes in function:  Yes (See Goal flowsheet attached for changes in current functional level)  Adverse reaction to treatment or activity: None    OBJECTIVE  Changes noted in objective findings:  Yes,   Objective: PROM 3-0-103, fair quad set, significant pain with extension stretching, 6 inch step up with good control, gait with decreased weight shift and knee extension with heel strike.       ASSESSMENT/PLAN  Updated problem list and treatment plan: Diagnosis 1:  L TKA  Pain -  hot/cold therapy  Decreased ROM/flexibility - manual therapy and therapeutic exercise  Decreased strength - therapeutic exercise and therapeutic activities  STG/LTGs have been met or progress has been made towards goals:  Yes (See Goal flow sheet completed today.)  Assessment of Progress: The patient's condition is improving.  Self Management Plans:  Patient has been instructed in a home treatment program.    Kacy continues to require the following intervention to meet STG and LTG's:  PT    Recommendations:  This patient would benefit from continued therapy.     Frequency: 1- 2 X week, once daily  Duration:  for 6 weeks          Please refer to the daily flowsheet for treatment today, total treatment time and time spent performing 1:1 timed codes.

## 2017-11-22 ENCOUNTER — THERAPY VISIT (OUTPATIENT)
Dept: PHYSICAL THERAPY | Facility: CLINIC | Age: 47
End: 2017-11-22
Payer: COMMERCIAL

## 2017-11-22 DIAGNOSIS — Z96.652 PRESENCE OF LEFT ARTIFICIAL KNEE JOINT: ICD-10-CM

## 2017-11-22 DIAGNOSIS — Z96.652 AFTERCARE FOLLOWING LEFT KNEE JOINT REPLACEMENT SURGERY: ICD-10-CM

## 2017-11-22 DIAGNOSIS — Z47.1 AFTERCARE FOLLOWING LEFT KNEE JOINT REPLACEMENT SURGERY: ICD-10-CM

## 2017-11-22 PROCEDURE — 97010 HOT OR COLD PACKS THERAPY: CPT | Mod: GP | Performed by: PHYSICAL THERAPIST

## 2017-11-22 PROCEDURE — 97110 THERAPEUTIC EXERCISES: CPT | Mod: GP | Performed by: PHYSICAL THERAPIST

## 2017-11-22 PROCEDURE — 97530 THERAPEUTIC ACTIVITIES: CPT | Mod: GP | Performed by: PHYSICAL THERAPIST

## 2017-11-24 ENCOUNTER — THERAPY VISIT (OUTPATIENT)
Dept: PHYSICAL THERAPY | Facility: CLINIC | Age: 47
End: 2017-11-24
Payer: COMMERCIAL

## 2017-11-24 DIAGNOSIS — Z96.652 PRESENCE OF LEFT ARTIFICIAL KNEE JOINT: ICD-10-CM

## 2017-11-24 DIAGNOSIS — Z47.1 AFTERCARE FOLLOWING LEFT KNEE JOINT REPLACEMENT SURGERY: ICD-10-CM

## 2017-11-24 DIAGNOSIS — Z96.652 AFTERCARE FOLLOWING LEFT KNEE JOINT REPLACEMENT SURGERY: ICD-10-CM

## 2017-11-24 PROCEDURE — 97530 THERAPEUTIC ACTIVITIES: CPT | Mod: GP | Performed by: PHYSICAL THERAPIST

## 2017-11-24 PROCEDURE — 97110 THERAPEUTIC EXERCISES: CPT | Mod: GP | Performed by: PHYSICAL THERAPIST

## 2017-11-24 PROCEDURE — 97010 HOT OR COLD PACKS THERAPY: CPT | Mod: GP | Performed by: PHYSICAL THERAPIST

## 2017-11-27 ENCOUNTER — THERAPY VISIT (OUTPATIENT)
Dept: PHYSICAL THERAPY | Facility: CLINIC | Age: 47
End: 2017-11-27
Payer: COMMERCIAL

## 2017-11-27 DIAGNOSIS — Z96.652 PRESENCE OF LEFT ARTIFICIAL KNEE JOINT: ICD-10-CM

## 2017-11-27 DIAGNOSIS — Z47.1 AFTERCARE FOLLOWING LEFT KNEE JOINT REPLACEMENT SURGERY: ICD-10-CM

## 2017-11-27 DIAGNOSIS — Z96.652 AFTERCARE FOLLOWING LEFT KNEE JOINT REPLACEMENT SURGERY: ICD-10-CM

## 2017-11-27 PROCEDURE — 97110 THERAPEUTIC EXERCISES: CPT | Mod: GP | Performed by: PHYSICAL THERAPIST

## 2017-11-27 PROCEDURE — 97530 THERAPEUTIC ACTIVITIES: CPT | Mod: GP | Performed by: PHYSICAL THERAPIST

## 2017-11-27 PROCEDURE — 97010 HOT OR COLD PACKS THERAPY: CPT | Mod: GP | Performed by: PHYSICAL THERAPIST

## 2017-11-28 PROBLEM — Z47.89 AFTERCARE FOLLOWING SURGERY OF THE MUSCULOSKELETAL SYSTEM: Status: RESOLVED | Noted: 2017-08-03 | Resolved: 2017-11-28

## 2017-11-28 PROBLEM — M25.562 LEFT KNEE PAIN: Status: RESOLVED | Noted: 2017-08-03 | Resolved: 2017-11-28

## 2017-11-28 NOTE — PROGRESS NOTES
Subjective:    HPI                    Objective:    System    Physical Exam    General     ROS    Assessment/Plan:      DISCHARGE REPORT    Progress reporting period is from 8/3/17 to 8/31/17 (7 visits).       SUBJECTIVE  Subjective changes noted by patient:  Kacy decided to discontinue PT following her last visit on 8/31/17 and would be pursuing a possible TKA.  Her status at that time: Subjective: Knee is the same.  Not having pain down the post thigh anymore, but continues to have LBP.  Doing press-ups as instructed.     Current pain level is NA  .     Previous pain level was  NA  .   Changes in function:  None  Adverse reaction to treatment or activity: None    OBJECTIVE  Changes noted in objective findings:    Objective: Knee pain with step downs.       ASSESSMENT/PLAN  Updated problem list and treatment plan: Diagnosis 1:  S/p left knee arthroscopic exploratory surgery    STG/LTGs have been met or progress has been made towards goals:  None  Assessment of Progress: The patient's condition is unchanged.  Self Management Plans:  Patient has been instructed in a home treatment program.    Kacy continues to require the following intervention to meet STG and LTG's:  PT intervention is no longer required to meet STG/LTG.    Recommendations:  This patient would benefit from further evaluation.  Discharge from PT.    Please refer to the daily flowsheet for treatment today, total treatment time and time spent performing 1:1 timed codes.

## 2017-11-29 ENCOUNTER — THERAPY VISIT (OUTPATIENT)
Dept: PHYSICAL THERAPY | Facility: CLINIC | Age: 47
End: 2017-11-29
Payer: COMMERCIAL

## 2017-11-29 DIAGNOSIS — Z96.652 PRESENCE OF LEFT ARTIFICIAL KNEE JOINT: ICD-10-CM

## 2017-11-29 DIAGNOSIS — Z47.1 AFTERCARE FOLLOWING LEFT KNEE JOINT REPLACEMENT SURGERY: ICD-10-CM

## 2017-11-29 DIAGNOSIS — Z96.652 AFTERCARE FOLLOWING LEFT KNEE JOINT REPLACEMENT SURGERY: ICD-10-CM

## 2017-11-29 PROCEDURE — 97530 THERAPEUTIC ACTIVITIES: CPT | Mod: GP | Performed by: PHYSICAL THERAPIST

## 2017-11-29 PROCEDURE — 97010 HOT OR COLD PACKS THERAPY: CPT | Mod: GP | Performed by: PHYSICAL THERAPIST

## 2017-11-29 PROCEDURE — 97110 THERAPEUTIC EXERCISES: CPT | Mod: GP | Performed by: PHYSICAL THERAPIST

## 2017-12-01 ENCOUNTER — THERAPY VISIT (OUTPATIENT)
Dept: PHYSICAL THERAPY | Facility: CLINIC | Age: 47
End: 2017-12-01
Payer: COMMERCIAL

## 2017-12-01 DIAGNOSIS — Z96.652 AFTERCARE FOLLOWING LEFT KNEE JOINT REPLACEMENT SURGERY: ICD-10-CM

## 2017-12-01 DIAGNOSIS — Z96.652 PRESENCE OF LEFT ARTIFICIAL KNEE JOINT: ICD-10-CM

## 2017-12-01 DIAGNOSIS — Z47.1 AFTERCARE FOLLOWING LEFT KNEE JOINT REPLACEMENT SURGERY: ICD-10-CM

## 2017-12-01 PROCEDURE — 97530 THERAPEUTIC ACTIVITIES: CPT | Mod: GP | Performed by: PHYSICAL THERAPIST

## 2017-12-01 PROCEDURE — 97010 HOT OR COLD PACKS THERAPY: CPT | Mod: GP | Performed by: PHYSICAL THERAPIST

## 2017-12-01 PROCEDURE — 97110 THERAPEUTIC EXERCISES: CPT | Mod: GP | Performed by: PHYSICAL THERAPIST

## 2017-12-05 ENCOUNTER — THERAPY VISIT (OUTPATIENT)
Dept: PHYSICAL THERAPY | Facility: CLINIC | Age: 47
End: 2017-12-05
Payer: COMMERCIAL

## 2017-12-05 DIAGNOSIS — Z96.652 PRESENCE OF LEFT ARTIFICIAL KNEE JOINT: ICD-10-CM

## 2017-12-05 DIAGNOSIS — Z47.1 AFTERCARE FOLLOWING LEFT KNEE JOINT REPLACEMENT SURGERY: ICD-10-CM

## 2017-12-05 DIAGNOSIS — Z96.652 AFTERCARE FOLLOWING LEFT KNEE JOINT REPLACEMENT SURGERY: ICD-10-CM

## 2017-12-05 PROCEDURE — 97110 THERAPEUTIC EXERCISES: CPT | Mod: GP | Performed by: PHYSICAL THERAPIST

## 2017-12-05 PROCEDURE — 97530 THERAPEUTIC ACTIVITIES: CPT | Mod: GP | Performed by: PHYSICAL THERAPIST

## 2017-12-08 ENCOUNTER — THERAPY VISIT (OUTPATIENT)
Dept: PHYSICAL THERAPY | Facility: CLINIC | Age: 47
End: 2017-12-08
Payer: COMMERCIAL

## 2017-12-08 DIAGNOSIS — Z96.652 PRESENCE OF LEFT ARTIFICIAL KNEE JOINT: ICD-10-CM

## 2017-12-08 DIAGNOSIS — Z96.652 AFTERCARE FOLLOWING LEFT KNEE JOINT REPLACEMENT SURGERY: ICD-10-CM

## 2017-12-08 DIAGNOSIS — Z47.1 AFTERCARE FOLLOWING LEFT KNEE JOINT REPLACEMENT SURGERY: ICD-10-CM

## 2017-12-08 PROCEDURE — 97110 THERAPEUTIC EXERCISES: CPT | Mod: GP | Performed by: PHYSICAL THERAPIST

## 2017-12-08 PROCEDURE — 97530 THERAPEUTIC ACTIVITIES: CPT | Mod: GP | Performed by: PHYSICAL THERAPIST

## 2017-12-08 PROCEDURE — 97010 HOT OR COLD PACKS THERAPY: CPT | Mod: GP | Performed by: PHYSICAL THERAPIST

## 2017-12-12 ENCOUNTER — THERAPY VISIT (OUTPATIENT)
Dept: PHYSICAL THERAPY | Facility: CLINIC | Age: 47
End: 2017-12-12
Payer: COMMERCIAL

## 2017-12-12 DIAGNOSIS — Z96.652 AFTERCARE FOLLOWING LEFT KNEE JOINT REPLACEMENT SURGERY: ICD-10-CM

## 2017-12-12 DIAGNOSIS — Z96.652 PRESENCE OF LEFT ARTIFICIAL KNEE JOINT: ICD-10-CM

## 2017-12-12 DIAGNOSIS — Z47.1 AFTERCARE FOLLOWING LEFT KNEE JOINT REPLACEMENT SURGERY: ICD-10-CM

## 2017-12-12 PROCEDURE — 97530 THERAPEUTIC ACTIVITIES: CPT | Mod: GP | Performed by: PHYSICAL THERAPIST

## 2017-12-12 PROCEDURE — 97010 HOT OR COLD PACKS THERAPY: CPT | Mod: GP | Performed by: PHYSICAL THERAPIST

## 2017-12-12 PROCEDURE — 97110 THERAPEUTIC EXERCISES: CPT | Mod: GP | Performed by: PHYSICAL THERAPIST

## 2017-12-12 NOTE — LETTER
[facility]  [address]  [facility] [address]  [phone]    2017    Re: Kacy Herrera   FELICITY:   1970  MRN:  [id number]   REFERRING PHYSICIAN:   Josh Moseley    [facility]  Date of Initial Evaluation:  2017  Visits:  Rxs Used: 17  Reason for Referral:     Aftercare following left knee joint replacement surgery  Presence of left artificial knee joint    PROGRESS  REPORT  Progress reporting period is from 17  to 17.       SUBJECTIVE  Subjective changes noted by patient:  Subjective: Had her best day since the TKA on .  Was able to walk without the crutch to go grocery shopping and to a hockey game and her knee felt good.  Still not sleeping well as she states that her knee is just not comfortable.  Able to ascend stairs reciprocally.    Current pain level is NA  .     Previous pain level was  NA  .   Changes in function:  Yes (See Goal flowsheet attached for changes in current functional level)  Adverse reaction to treatment or activity: None    OBJECTIVE  Changes noted in objective findings:    Objective: PROM: 0-7-108.  Gait: lacks full extension of knee and has limited knee flexion.       ASSESSMENT/PLAN  Updated problem list and treatment plan: Diagnosis 1:  Left TKA    STG/LTGs have been met or progress has been made towards goals:  Yes (See Goal flow sheet completed today.)  Assessment of Progress: The patient's condition is improving.  Self Management Plans:  Patient has been instructed in a home treatment program.    Kacy continues to require the following intervention to meet STG and LTG's:  PT    Recommendations:  This patient would benefit from continued therapy.     Frequency:  2 X week, once daily  Duration:  for 2 weeks tapering to 1 X a week over 4 weeks        Re: Kacy HESS Richardtad   FELICITY:   1970                                      Thank you for your  referral.    INQUIRIES  Therapist: Sinan Collazo PT   [facility]  [address]  [facility] [address]  Phone: [phone]  Fax: [phone]

## 2017-12-12 NOTE — PROGRESS NOTES
Subjective:    HPI                    Objective:    System    Physical Exam    General     ROS    Assessment/Plan:      PROGRESS  REPORT    Progress reporting period is from 11/20/17  to 12/12/17.       SUBJECTIVE  Subjective changes noted by patient:  Subjective: Had her best day since the TKA on Sunday.  Was able to walk without the crutch to go grocery shopping and to a hockey game and her knee felt good.  Still not sleeping well as she states that her knee is just not comfortable.  Able to ascend stairs reciprocally.    Current pain level is NA  .     Previous pain level was  NA  .   Changes in function:  Yes (See Goal flowsheet attached for changes in current functional level)  Adverse reaction to treatment or activity: None    OBJECTIVE  Changes noted in objective findings:    Objective: PROM: 0-7-108.  Gait: lacks full extension of knee and has limited knee flexion.       ASSESSMENT/PLAN  Updated problem list and treatment plan: Diagnosis 1:  Left TKA    STG/LTGs have been met or progress has been made towards goals:  Yes (See Goal flow sheet completed today.)  Assessment of Progress: The patient's condition is improving.  Self Management Plans:  Patient has been instructed in a home treatment program.    Kacy continues to require the following intervention to meet STG and LTG's:  PT    Recommendations:  This patient would benefit from continued therapy.     Frequency:  2 X week, once daily  Duration:  for 2 weeks tapering to 1 X a week over 4 weeks          Please refer to the daily flowsheet for treatment today, total treatment time and time spent performing 1:1 timed codes.

## 2017-12-15 ENCOUNTER — THERAPY VISIT (OUTPATIENT)
Dept: PHYSICAL THERAPY | Facility: CLINIC | Age: 47
End: 2017-12-15
Payer: COMMERCIAL

## 2017-12-15 DIAGNOSIS — Z96.652 PRESENCE OF LEFT ARTIFICIAL KNEE JOINT: ICD-10-CM

## 2017-12-15 DIAGNOSIS — Z47.1 AFTERCARE FOLLOWING LEFT KNEE JOINT REPLACEMENT SURGERY: ICD-10-CM

## 2017-12-15 DIAGNOSIS — Z96.652 AFTERCARE FOLLOWING LEFT KNEE JOINT REPLACEMENT SURGERY: ICD-10-CM

## 2017-12-15 PROCEDURE — 97010 HOT OR COLD PACKS THERAPY: CPT | Mod: GP | Performed by: PHYSICAL THERAPIST

## 2017-12-15 PROCEDURE — 97110 THERAPEUTIC EXERCISES: CPT | Mod: GP | Performed by: PHYSICAL THERAPIST

## 2017-12-15 PROCEDURE — 97530 THERAPEUTIC ACTIVITIES: CPT | Mod: GP | Performed by: PHYSICAL THERAPIST

## 2017-12-19 ENCOUNTER — THERAPY VISIT (OUTPATIENT)
Dept: PHYSICAL THERAPY | Facility: CLINIC | Age: 47
End: 2017-12-19
Payer: COMMERCIAL

## 2017-12-19 DIAGNOSIS — Z47.1 AFTERCARE FOLLOWING LEFT KNEE JOINT REPLACEMENT SURGERY: ICD-10-CM

## 2017-12-19 DIAGNOSIS — Z96.652 AFTERCARE FOLLOWING LEFT KNEE JOINT REPLACEMENT SURGERY: ICD-10-CM

## 2017-12-19 DIAGNOSIS — Z96.652 PRESENCE OF LEFT ARTIFICIAL KNEE JOINT: ICD-10-CM

## 2017-12-19 PROCEDURE — 97110 THERAPEUTIC EXERCISES: CPT | Mod: GP | Performed by: PHYSICAL THERAPIST

## 2017-12-19 PROCEDURE — 97010 HOT OR COLD PACKS THERAPY: CPT | Mod: GP | Performed by: PHYSICAL THERAPIST

## 2017-12-19 PROCEDURE — 97530 THERAPEUTIC ACTIVITIES: CPT | Mod: GP | Performed by: PHYSICAL THERAPIST

## 2017-12-21 ENCOUNTER — THERAPY VISIT (OUTPATIENT)
Dept: PHYSICAL THERAPY | Facility: CLINIC | Age: 47
End: 2017-12-21
Payer: COMMERCIAL

## 2017-12-21 DIAGNOSIS — Z96.652 AFTERCARE FOLLOWING LEFT KNEE JOINT REPLACEMENT SURGERY: ICD-10-CM

## 2017-12-21 DIAGNOSIS — Z47.1 AFTERCARE FOLLOWING LEFT KNEE JOINT REPLACEMENT SURGERY: ICD-10-CM

## 2017-12-21 DIAGNOSIS — Z96.652 PRESENCE OF LEFT ARTIFICIAL KNEE JOINT: ICD-10-CM

## 2017-12-21 PROCEDURE — 97110 THERAPEUTIC EXERCISES: CPT | Mod: GP | Performed by: PHYSICAL THERAPIST

## 2017-12-21 PROCEDURE — 97010 HOT OR COLD PACKS THERAPY: CPT | Mod: GP | Performed by: PHYSICAL THERAPIST

## 2017-12-21 PROCEDURE — 97530 THERAPEUTIC ACTIVITIES: CPT | Mod: GP | Performed by: PHYSICAL THERAPIST

## 2017-12-28 ENCOUNTER — THERAPY VISIT (OUTPATIENT)
Dept: PHYSICAL THERAPY | Facility: CLINIC | Age: 47
End: 2017-12-28
Payer: COMMERCIAL

## 2017-12-28 DIAGNOSIS — Z96.652 AFTERCARE FOLLOWING LEFT KNEE JOINT REPLACEMENT SURGERY: ICD-10-CM

## 2017-12-28 DIAGNOSIS — Z96.652 PRESENCE OF LEFT ARTIFICIAL KNEE JOINT: ICD-10-CM

## 2017-12-28 DIAGNOSIS — Z47.1 AFTERCARE FOLLOWING LEFT KNEE JOINT REPLACEMENT SURGERY: ICD-10-CM

## 2017-12-28 PROCEDURE — 97530 THERAPEUTIC ACTIVITIES: CPT | Mod: GP

## 2017-12-28 PROCEDURE — 97110 THERAPEUTIC EXERCISES: CPT | Mod: GP

## 2017-12-28 PROCEDURE — 97010 HOT OR COLD PACKS THERAPY: CPT | Mod: GP

## 2017-12-29 ENCOUNTER — THERAPY VISIT (OUTPATIENT)
Dept: PHYSICAL THERAPY | Facility: CLINIC | Age: 47
End: 2017-12-29
Payer: COMMERCIAL

## 2017-12-29 DIAGNOSIS — Z96.652 PRESENCE OF LEFT ARTIFICIAL KNEE JOINT: ICD-10-CM

## 2017-12-29 DIAGNOSIS — Z96.652 AFTERCARE FOLLOWING LEFT KNEE JOINT REPLACEMENT SURGERY: ICD-10-CM

## 2017-12-29 DIAGNOSIS — Z47.1 AFTERCARE FOLLOWING LEFT KNEE JOINT REPLACEMENT SURGERY: ICD-10-CM

## 2017-12-29 PROCEDURE — 97530 THERAPEUTIC ACTIVITIES: CPT | Mod: GP | Performed by: PHYSICAL THERAPIST

## 2017-12-29 PROCEDURE — 97110 THERAPEUTIC EXERCISES: CPT | Mod: GP | Performed by: PHYSICAL THERAPIST

## 2017-12-29 PROCEDURE — 97010 HOT OR COLD PACKS THERAPY: CPT | Mod: GP | Performed by: PHYSICAL THERAPIST

## 2018-01-05 ENCOUNTER — THERAPY VISIT (OUTPATIENT)
Dept: PHYSICAL THERAPY | Facility: CLINIC | Age: 48
End: 2018-01-05
Payer: COMMERCIAL

## 2018-01-05 DIAGNOSIS — Z96.652 AFTERCARE FOLLOWING LEFT KNEE JOINT REPLACEMENT SURGERY: ICD-10-CM

## 2018-01-05 DIAGNOSIS — Z96.652 PRESENCE OF LEFT ARTIFICIAL KNEE JOINT: ICD-10-CM

## 2018-01-05 DIAGNOSIS — Z47.1 AFTERCARE FOLLOWING LEFT KNEE JOINT REPLACEMENT SURGERY: ICD-10-CM

## 2018-01-05 PROCEDURE — 97010 HOT OR COLD PACKS THERAPY: CPT | Mod: GP | Performed by: PHYSICAL THERAPIST

## 2018-01-05 PROCEDURE — 97530 THERAPEUTIC ACTIVITIES: CPT | Mod: GP | Performed by: PHYSICAL THERAPIST

## 2018-01-05 PROCEDURE — 97110 THERAPEUTIC EXERCISES: CPT | Mod: GP | Performed by: PHYSICAL THERAPIST

## 2018-01-10 ENCOUNTER — THERAPY VISIT (OUTPATIENT)
Dept: PHYSICAL THERAPY | Facility: CLINIC | Age: 48
End: 2018-01-10
Payer: COMMERCIAL

## 2018-01-10 DIAGNOSIS — Z96.652 PRESENCE OF LEFT ARTIFICIAL KNEE JOINT: ICD-10-CM

## 2018-01-10 DIAGNOSIS — Z47.1 AFTERCARE FOLLOWING LEFT KNEE JOINT REPLACEMENT SURGERY: ICD-10-CM

## 2018-01-10 DIAGNOSIS — Z96.652 AFTERCARE FOLLOWING LEFT KNEE JOINT REPLACEMENT SURGERY: ICD-10-CM

## 2018-01-10 PROCEDURE — 97010 HOT OR COLD PACKS THERAPY: CPT | Mod: GP | Performed by: PHYSICAL THERAPIST

## 2018-01-10 PROCEDURE — 97110 THERAPEUTIC EXERCISES: CPT | Mod: GP | Performed by: PHYSICAL THERAPIST

## 2018-01-10 PROCEDURE — 97530 THERAPEUTIC ACTIVITIES: CPT | Mod: GP | Performed by: PHYSICAL THERAPIST

## 2018-01-12 ENCOUNTER — THERAPY VISIT (OUTPATIENT)
Dept: PHYSICAL THERAPY | Facility: CLINIC | Age: 48
End: 2018-01-12
Payer: COMMERCIAL

## 2018-01-12 DIAGNOSIS — Z96.652 PRESENCE OF LEFT ARTIFICIAL KNEE JOINT: ICD-10-CM

## 2018-01-12 DIAGNOSIS — Z96.652 AFTERCARE FOLLOWING LEFT KNEE JOINT REPLACEMENT SURGERY: ICD-10-CM

## 2018-01-12 DIAGNOSIS — Z47.1 AFTERCARE FOLLOWING LEFT KNEE JOINT REPLACEMENT SURGERY: ICD-10-CM

## 2018-01-12 PROCEDURE — 97530 THERAPEUTIC ACTIVITIES: CPT | Mod: GP | Performed by: PHYSICAL THERAPIST

## 2018-01-12 PROCEDURE — 97110 THERAPEUTIC EXERCISES: CPT | Mod: GP | Performed by: PHYSICAL THERAPIST

## 2018-01-12 PROCEDURE — 97010 HOT OR COLD PACKS THERAPY: CPT | Mod: GP | Performed by: PHYSICAL THERAPIST

## 2018-01-24 ENCOUNTER — THERAPY VISIT (OUTPATIENT)
Dept: PHYSICAL THERAPY | Facility: CLINIC | Age: 48
End: 2018-01-24
Payer: COMMERCIAL

## 2018-01-24 DIAGNOSIS — Z96.652 AFTERCARE FOLLOWING LEFT KNEE JOINT REPLACEMENT SURGERY: ICD-10-CM

## 2018-01-24 DIAGNOSIS — Z47.1 AFTERCARE FOLLOWING LEFT KNEE JOINT REPLACEMENT SURGERY: ICD-10-CM

## 2018-01-24 DIAGNOSIS — Z96.652 PRESENCE OF LEFT ARTIFICIAL KNEE JOINT: ICD-10-CM

## 2018-01-24 PROCEDURE — 97010 HOT OR COLD PACKS THERAPY: CPT | Mod: GP

## 2018-01-24 PROCEDURE — 97110 THERAPEUTIC EXERCISES: CPT | Mod: GP

## 2018-01-24 PROCEDURE — 97530 THERAPEUTIC ACTIVITIES: CPT | Mod: GP

## 2018-01-26 ENCOUNTER — THERAPY VISIT (OUTPATIENT)
Dept: PHYSICAL THERAPY | Facility: CLINIC | Age: 48
End: 2018-01-26
Payer: COMMERCIAL

## 2018-01-26 DIAGNOSIS — Z47.1 AFTERCARE FOLLOWING LEFT KNEE JOINT REPLACEMENT SURGERY: ICD-10-CM

## 2018-01-26 DIAGNOSIS — Z96.652 AFTERCARE FOLLOWING LEFT KNEE JOINT REPLACEMENT SURGERY: ICD-10-CM

## 2018-01-26 DIAGNOSIS — Z96.652 PRESENCE OF LEFT ARTIFICIAL KNEE JOINT: ICD-10-CM

## 2018-01-26 PROCEDURE — 97010 HOT OR COLD PACKS THERAPY: CPT | Mod: GP

## 2018-01-26 PROCEDURE — 97110 THERAPEUTIC EXERCISES: CPT | Mod: GP

## 2018-01-26 PROCEDURE — 97530 THERAPEUTIC ACTIVITIES: CPT | Mod: GP

## 2018-02-27 ENCOUNTER — THERAPY VISIT (OUTPATIENT)
Dept: PHYSICAL THERAPY | Facility: CLINIC | Age: 48
End: 2018-02-27
Payer: COMMERCIAL

## 2018-02-27 DIAGNOSIS — Z96.652 AFTERCARE FOLLOWING LEFT KNEE JOINT REPLACEMENT SURGERY: ICD-10-CM

## 2018-02-27 DIAGNOSIS — Z96.652 PRESENCE OF LEFT ARTIFICIAL KNEE JOINT: ICD-10-CM

## 2018-02-27 DIAGNOSIS — Z47.1 AFTERCARE FOLLOWING LEFT KNEE JOINT REPLACEMENT SURGERY: ICD-10-CM

## 2018-02-27 PROCEDURE — 97110 THERAPEUTIC EXERCISES: CPT | Mod: GP | Performed by: PHYSICAL THERAPIST

## 2018-02-27 PROCEDURE — 97010 HOT OR COLD PACKS THERAPY: CPT | Mod: GP | Performed by: PHYSICAL THERAPIST

## 2018-03-02 ENCOUNTER — THERAPY VISIT (OUTPATIENT)
Dept: PHYSICAL THERAPY | Facility: CLINIC | Age: 48
End: 2018-03-02
Payer: COMMERCIAL

## 2018-03-02 DIAGNOSIS — Z47.1 AFTERCARE FOLLOWING LEFT KNEE JOINT REPLACEMENT SURGERY: ICD-10-CM

## 2018-03-02 DIAGNOSIS — Z96.652 AFTERCARE FOLLOWING LEFT KNEE JOINT REPLACEMENT SURGERY: ICD-10-CM

## 2018-03-02 DIAGNOSIS — Z96.652 PRESENCE OF LEFT ARTIFICIAL KNEE JOINT: ICD-10-CM

## 2018-03-02 PROCEDURE — 97110 THERAPEUTIC EXERCISES: CPT | Mod: GP | Performed by: PHYSICAL THERAPIST

## 2018-03-02 PROCEDURE — 97010 HOT OR COLD PACKS THERAPY: CPT | Mod: GP | Performed by: PHYSICAL THERAPIST

## 2018-03-06 ENCOUNTER — THERAPY VISIT (OUTPATIENT)
Dept: PHYSICAL THERAPY | Facility: CLINIC | Age: 48
End: 2018-03-06
Payer: COMMERCIAL

## 2018-03-06 DIAGNOSIS — Z96.652 AFTERCARE FOLLOWING LEFT KNEE JOINT REPLACEMENT SURGERY: ICD-10-CM

## 2018-03-06 DIAGNOSIS — Z96.652 PRESENCE OF LEFT ARTIFICIAL KNEE JOINT: ICD-10-CM

## 2018-03-06 DIAGNOSIS — Z47.1 AFTERCARE FOLLOWING LEFT KNEE JOINT REPLACEMENT SURGERY: ICD-10-CM

## 2018-03-06 PROCEDURE — 97010 HOT OR COLD PACKS THERAPY: CPT | Mod: GP | Performed by: PHYSICAL THERAPIST

## 2018-03-06 PROCEDURE — 97110 THERAPEUTIC EXERCISES: CPT | Mod: GP | Performed by: PHYSICAL THERAPIST

## 2018-03-06 NOTE — LETTER
[facility]  [address] [address] [phone]    2018    Re: Kacy Herrera   :   1970  MRN:  [id number]   REFERRING PHYSICIAN:   Josh Moseley    [email or web address]    Date of Initial Evaluation:  2017  Visits:  Rxs Used: 30  Reason for Referral:     Aftercare following left knee joint replacement surgery  Presence of left artificial knee joint     PROGRESS  REPORT  Progress reporting period is from 18 to 3/6/18 (3 visits).     SUBJECTIVE  Subjective changes noted by patient:  Kacy continues to describe constant pain with intermittent clicking in the knee.  She is stretching aggressively in PT and on her own at home.     Current pain level is NA  .     Previous pain level was  NA  .   Changes in function:  Is able to ascend stairs reciprocally well, but descending is more difficult.  She can walk for 15 minutes, but reports increased knee pain.  Adverse reaction to treatment or activity: None  OBJECTIVE  Changes noted in objective findings:    Objective: PROM: 0-2-108 (best flexion measurement has been 116).   ASSESSMENT/PLAN  Updated problem list and treatment plan: Diagnosis 1:  Left TKA    STG/LTGs have been met or progress has been made towards goals:  Yes (See Goal flow sheet completed today.)  Assessment of Progress: The patient's condition has potential to improve.  Self Management Plans:  Patient has been instructed in a home treatment program.  Kacy continues to require the following intervention to meet STG and LTG's:  PT  Recommendations:  Will continue PT as indicated.    Thank you for your referral.    INQUIRIES  Therapist: Sinan Collazo, PT  [facility]  [address] [address]  Phone: [phone]  Fax: [phone]

## 2018-03-06 NOTE — PROGRESS NOTES
Subjective:  HPI                    Objective:  System    Physical Exam    General     ROS    Assessment/Plan:    PROGRESS  REPORT    Progress reporting period is from 2/27/18 to 3/6/18 (3 visits).       SUBJECTIVE  Subjective changes noted by patient:  Kacy continues to describe constant pain with intermittent clicking in the knee.  She is stretching aggressively in PT and on her own at home..       Current pain level is NA  .     Previous pain level was  NA  .   Changes in function:  Is able to ascend stairs reciprocally well, but descending is more difficult.  She can walk for 15 minutes, but reports increased knee pain.  Adverse reaction to treatment or activity: None    OBJECTIVE  Changes noted in objective findings:    Objective: PROM: 0-2-108 (best flexion measurement has been 116).     ASSESSMENT/PLAN  Updated problem list and treatment plan: Diagnosis 1:  Left TKA    STG/LTGs have been met or progress has been made towards goals:  Yes (See Goal flow sheet completed today.)  Assessment of Progress: The patient's condition has potential to improve.  Self Management Plans:  Patient has been instructed in a home treatment program.    Kacy continues to require the following intervention to meet STG and LTG's:  PT    Recommendations:  Will continue PT as indicated.    Please refer to the daily flowsheet for treatment today, total treatment time and time spent performing 1:1 timed codes.

## 2018-03-09 ENCOUNTER — THERAPY VISIT (OUTPATIENT)
Dept: PHYSICAL THERAPY | Facility: CLINIC | Age: 48
End: 2018-03-09
Payer: COMMERCIAL

## 2018-03-09 DIAGNOSIS — Z96.652 AFTERCARE FOLLOWING LEFT KNEE JOINT REPLACEMENT SURGERY: ICD-10-CM

## 2018-03-09 DIAGNOSIS — Z47.1 AFTERCARE FOLLOWING LEFT KNEE JOINT REPLACEMENT SURGERY: ICD-10-CM

## 2018-03-09 DIAGNOSIS — Z96.652 PRESENCE OF LEFT ARTIFICIAL KNEE JOINT: ICD-10-CM

## 2018-03-09 PROCEDURE — 97010 HOT OR COLD PACKS THERAPY: CPT | Mod: GP | Performed by: PHYSICAL THERAPIST

## 2018-03-09 PROCEDURE — 97110 THERAPEUTIC EXERCISES: CPT | Mod: GP | Performed by: PHYSICAL THERAPIST

## 2018-03-09 NOTE — LETTER
[facility]  [address] [address] [address] [phone]    May 2, 2018    Re: Kacy Herrera   :   1970  MRN:  [id number]   REFERRING PHYSICIAN:   Josh Moseley    [facility]    Date of Initial Evaluation:  2018  Visits:  Rxs Used: 31  Reason for Referral:     Aftercare following left knee joint replacement surgery  Presence of left artificial knee joint    DISCHARGE REPORT  Progress reporting period is from 3/9/18 to present (18).       SUBJECTIVE  Subjective changes noted by patient:  Kacy did not continue PT following her last visit on 3/9/18.  Her current status is unknown .  Subjective: States that  Pradip wants her to continue to push her knee hard to gain ROM.      Current pain level is NA  .     Previous pain level was  NA  .   Changes in function:  Unknown currently.  Adverse reaction to treatment or activity: None    OBJECTIVE  Changes noted in objective findings:  Patient has failed to return to therapy so current objective findings are unknown.  Objective: [id number]     ASSESSMENT/PLAN  Updated problem list and treatment plan: Diagnosis 1:  Left TKA    STG/LTGs have been met or progress has been made towards goals:  Unknown.  Assessment of Progress: The patient's condition has potential to improve.  Self Management Plans:  Patient has been instructed in a home treatment program.  Kacy continues to require the following intervention to meet STG and LTG's:  Unknown.    Recommendations:  Will discharge from PT as pt has not attended further PT.    Thank you for your referral.    INQUIRIES  Therapist: Sinan Collazo, PT   [facility]  [address] [address] [address]  Phone: [phone]  Fax: [phone]

## 2018-03-28 ENCOUNTER — TRANSFERRED RECORDS (OUTPATIENT)
Dept: HEALTH INFORMATION MANAGEMENT | Facility: CLINIC | Age: 48
End: 2018-03-28

## 2018-05-02 PROBLEM — Z47.1 AFTERCARE FOLLOWING LEFT KNEE JOINT REPLACEMENT SURGERY: Status: RESOLVED | Noted: 2017-11-06 | Resolved: 2018-05-02

## 2018-05-02 PROBLEM — Z96.652 AFTERCARE FOLLOWING LEFT KNEE JOINT REPLACEMENT SURGERY: Status: RESOLVED | Noted: 2017-11-06 | Resolved: 2018-05-02

## 2018-05-02 PROBLEM — Z96.652 PRESENCE OF LEFT ARTIFICIAL KNEE JOINT: Status: RESOLVED | Noted: 2017-11-06 | Resolved: 2018-05-02

## 2018-05-02 NOTE — PROGRESS NOTES
Subjective:  HPI                    Objective:  System    Physical Exam    General     ROS    Assessment/Plan:    DISCHARGE REPORT    Progress reporting period is from 3/9/18 to present (5/2/18).       SUBJECTIVE  Subjective changes noted by patient:  Kacy did not continue PT following her last visit on 3/9/18.  Her current status is unknown .  Subjective: States that Dr Moseley wants her to continue to push her knee hard to gain ROM.      Current pain level is NA  .     Previous pain level was  NA  .   Changes in function:  Unknown currently.  Adverse reaction to treatment or activity: None    OBJECTIVE  Changes noted in objective findings:  Patient has failed to return to therapy so current objective findings are unknown.  Objective: Qizqgmjny=[id number]     ASSESSMENT/PLAN  Updated problem list and treatment plan: Diagnosis 1:  Left TKA    STG/LTGs have been met or progress has been made towards goals:  Unknown.  Assessment of Progress: The patient's condition has potential to improve.  Self Management Plans:  Patient has been instructed in a home treatment program.    Kacy continues to require the following intervention to meet STG and LTG's:  Unknown.    Recommendations:  Will discharge from PT as pt has not attended further PT.    Please refer to the daily flowsheet for treatment today, total treatment time and time spent performing 1:1 timed codes.

## 2018-05-24 ENCOUNTER — TRANSFERRED RECORDS (OUTPATIENT)
Dept: HEALTH INFORMATION MANAGEMENT | Facility: CLINIC | Age: 48
End: 2018-05-24

## 2018-09-11 ENCOUNTER — TELEPHONE (OUTPATIENT)
Dept: ORTHOPEDICS | Facility: CLINIC | Age: 48
End: 2018-09-11

## 2018-09-11 NOTE — TELEPHONE ENCOUNTER
Pt called to reschedule clinic visit appointment. Appointment made with Dr. Brewer on 9/24/18.    Alex Kruger

## 2018-09-14 ENCOUNTER — PRE VISIT (OUTPATIENT)
Dept: ORTHOPEDICS | Facility: CLINIC | Age: 48
End: 2018-09-14

## 2018-09-19 ENCOUNTER — TELEPHONE (OUTPATIENT)
Dept: ORTHOPEDICS | Facility: CLINIC | Age: 48
End: 2018-09-19

## 2018-09-19 NOTE — TELEPHONE ENCOUNTER
Pt was originally scheduled with Dr Holcomb but then rescheduled to Dr Brewer for this Monday 9/17. Was told by Carolina, Dr Brewer's nurse, that this patient was incorrectly scheduled and needs to see Dr Smart instead. I spoke with the patient and informed her of the situation and scheduled her for 10/3 at 3:45 with Dr Smart and apologized for any confusion regarding this. Pt mentioned she will mail her records and imaging disks tomorrow (9/20) so they can be reviewed before her appt.

## 2018-09-24 DIAGNOSIS — M25.562 LEFT KNEE PAIN: Primary | ICD-10-CM

## 2018-10-03 ENCOUNTER — RADIANT APPOINTMENT (OUTPATIENT)
Dept: GENERAL RADIOLOGY | Facility: CLINIC | Age: 48
End: 2018-10-03
Attending: ORTHOPAEDIC SURGERY
Payer: COMMERCIAL

## 2018-10-03 ENCOUNTER — OFFICE VISIT (OUTPATIENT)
Dept: ORTHOPEDICS | Facility: CLINIC | Age: 48
End: 2018-10-03
Payer: COMMERCIAL

## 2018-10-03 VITALS — BODY MASS INDEX: 32.76 KG/M2 | WEIGHT: 178 LBS | HEIGHT: 62 IN

## 2018-10-03 DIAGNOSIS — M25.562 LEFT KNEE PAIN: ICD-10-CM

## 2018-10-03 DIAGNOSIS — Z96.652 STATUS POST TOTAL LEFT KNEE REPLACEMENT: Primary | ICD-10-CM

## 2018-10-03 RX ORDER — VENLAFAXINE HYDROCHLORIDE 150 MG/1
150 CAPSULE, EXTENDED RELEASE ORAL DAILY
COMMUNITY

## 2018-10-03 ASSESSMENT — KOOS JR
HOW SEVERE IS YOUR KNEE STIFFNESS AFTER FIRST WAKING IN MORNING: 1
HOW SEVERE IS YOUR KNEE STIFFNESS AFTER FIRST WAKING IN MORNING: SEVERE

## 2018-10-03 ASSESSMENT — ACTIVITIES OF DAILY LIVING (ADL): FUNCTION,_DAILY_LIVING_SCORE: 51.47

## 2018-10-03 NOTE — NURSING NOTE
"Reason For Visit:   Chief Complaint   Patient presents with     Consult     Left knee. Having issues with the TKA that was placed.        Primary MD: [facility] 1.575 m (5' 2\")  Wt 80.7 kg (178 lb)  BMI 32.56 kg/m2    Pain Assessment  Patient Currently in Pain: Yes  0-10 Pain Scale: 7  Primary Pain Location: Knee  Pain Orientation: Left  Pain Descriptors: Aching, Burning, Sharp, Shooting    Current Outpatient Prescriptions   Medication     HYDROcodone-acetaminophen (NORCO) 5-325 MG per tablet     Methylphenidate HCl (CONCERTA PO)     venlafaxine (EFFEXOR-XR) 150 MG 24 hr capsule     No current facility-administered medications for this visit.           Allergies   Allergen Reactions     Zithromax [Azithromycin] Bimal Aquino LPN    "

## 2018-10-03 NOTE — MR AVS SNAPSHOT
"              After Visit Summary   10/3/2018    Kacy Herrera    MRN: [id number]           Patient Information     Date Of Birth          1970        Visit Information        Provider Department      10/3/2018 3:45 PM Jason Smart [facility]        Today's Diagnoses     Status post total left knee replacement    -  1       Follow-ups after your visit        Who to contact     Please call your clinic at [phone] to:    Ask questions about your health    Make or cancel appointments    Discuss your medicines    Learn about your test results    Speak to your doctor            Additional Information About Your Visit        MyChart Information     [employer] is an electronic gateway that provides easy, online access to your medical records. With [employer], you can request a clinic appointment, read your test results, renew a prescription or communicate with your care team.     To sign up for [employer] visit the website at [email or web address]   You will be asked to enter the access code listed below, as well as some personal information. Please follow the directions to create your username and password.     Your access code is: [email or web address]  Expires: 2018  6:30 AM     Your access code will  in 90 days. If you need help or a new code, please contact your [facility] or call [phone] for assistance.        Care EveryWhere ID     This is your Care EveryWhere ID. This could be used by other organizations to access your [place] medical records  [id number]        Your Vitals Were     Height BMI (Body Mass Index)                1.575 m (5' 2\") 32.56 kg/m2           Blood Pressure from Last 3 Encounters:   16 (!) 152/92    Weight from Last 3 Encounters:   10/03/18 80.7 kg (178 lb)              We Performed the Following     Anaerobic bacterial culture     Cell count with differential fluid     Fluid Culture Aerobic Bacterial     " Fernanda Periprosthetic Joint Infection Detection        Primary Care Provider Fax #    [facility] [phone] [address]  [facility] [address]        Equal Access to Services     FRANSICO KIM : Hadii aad ku hadlesleyfrancisca Eli, jerry luqadaha, qalvta kasharda nakul, guzman dumont laCaronleonardo villeda. So [facility] [phone].    ATENCIÓN: Si habla español, tiene a de león disposición servicios gratuitos de asistencia lingüística. Llame al [phone].    We comply with applicable federal civil rights laws and [place] laws. We do not discriminate on the basis of race, color, national origin, age, disability, sex, sexual orientation, or gender identity.            Thank you!     Thank you for choosing [facility]  for your care. Our goal is always to provide you with excellent care. Hearing back from our patients is one way we can continue to improve our services. Please take a few minutes to complete the written survey that you may receive in the mail after your visit with us. Thank you!             Your Updated Medication List - Protect others around you: Learn how to safely use, store and throw away your medicines at www.disposemymeds.org.          This list is accurate as of 10/3/18 11:59 PM.  Always use your most recent med list.                   Brand Name Dispense Instructions for use Diagnosis    CONCERTA PO      Take by mouth daily        HYDROcodone-acetaminophen 5-325 MG per tablet    NORCO    15 tablet    Take 1-2 tablets by mouth every 4 hours as needed for moderate to severe pain        venlafaxine 150 MG 24 hr capsule    EFFEXOR-XR     Take 150 mg by mouth

## 2018-10-03 NOTE — LETTER
10/3/2018       RE: Kacy Herrera  [address] [place] [address]     Dear Colleague,    Thank you for referring your patient, Kacy Herrera, to the [facility]. Please see a copy of my visit note below.    [facility], Orthopaedic Surgery, Arthritis, Hip and Knee Replacement    Kacy Herrera MRN# [id number]   Age: 48 year old YOB: 1970     Requesting physician: Referred Self              History of Present Illness:   Kacy Herrera is a 48 year old year old female who presents today for evaluation and management of a painful left TKA.    Kacy is a very pleasant 48-year-old woman with a painful left total knee replacement.  She initially underwent a left partial knee replacement in March 2016 by Dr. Garcia.  She had pain for roughly 7 months postoperatively.  She tried injections without any improvement in her symptoms.  She subsequently got a second opinion from Dr. Mitchell.  A bone scan was eventually obtained which demonstrated increased signal concerning for loosening.  The patient subsequently underwent a conversion to a total knee replacement.  Following the total knee replacement she did extensive physical therapy.  She has persistent pain in the back of her leg.  This was initially diagnosed as sciatica type symptoms for which she tried a course of oral steroids.  This did not alleviate her symptoms.  She eventually underwent a lumbar decompression which totally reserved all of her radicular type pain.  Unfortunately she has had persistent pain and limitation in range of motion ever since.  They had initially considered proceeding with manipulation under anesthesia.  However it was thought that it was too late and the patient has not undergone any further surgery since the knee replacement.  She describes the pain is diffuse throughout her knee.  She has not had any recent infectious  workup nor did she have any postoperative concern for infection or wound healing problems.  At this point she feels like her normal activities of daily living are severely limited.  She has difficulty working and has persistent pain in the wound.  Of note her total knee replacement was in 2017.             Past Medical History:     Patient Active Problem List   Diagnosis     Pain in joint, ankle and foot     Past Medical History:   Diagnosis Date     Depressive disorder      Prior history of blood clot: none  Prior history of bleeding problems: none  Prior history of anesthetic complications: none  Currently on opioids:  none  History of Diabetes (if so, recent A1c): no           Past Surgical History:     Past Surgical History:   Procedure Laterality Date      SECTION       ORTHOPEDIC SURGERY      foot            Social History:     Social History     Social History     Marital status:      Spouse name: N/A     Number of children: N/A     Years of education: N/A     Occupational History     Not on file.     Social History Main Topics     Smoking status: Current Every Day Smoker     Packs/day: 1.00     Smokeless tobacco: Not on file     Alcohol use Yes      Comment: few times per week     Drug use: No     Sexual activity: Not on file     Other Topics Concern     Not on file     Social History Narrative     No narrative on file       Smoking: smokes 1 PPD.           Family History:     No family history on file.             Medications:     Current Outpatient Prescriptions   Medication Sig     HYDROcodone-acetaminophen (NORCO) 5-325 MG per tablet Take 1-2 tablets by mouth every 4 hours as needed for moderate to severe pain (Patient not taking: Reported on 10/3/2018)     Methylphenidate HCl (CONCERTA PO) Take by mouth daily     venlafaxine (EFFEXOR-XR) 150 MG 24 hr capsule Take 150 mg by mouth     No current facility-administered medications for this visit.             Review of Systems:   A  "comprehensive 10 point review of systems (constitutional, ENT, cardiac, peripheral vascular, lymphatic, respiratory, GI, , Musculoskeletal, skin, Neurological) was performed and found to be negative except as described in this note.     Also see intake form completed by patient.             Physical Exam:     EXAMINATION pertinent findings:   VITAL SIGNS: Height 1.575 m (5' 2\"), weight 80.7 kg (178 lb).  Body mass index is 32.56 kg/(m^2).  GEN: AOx3, cooperative, no distress  RESP: non labored breathing   ABD: benign   SKIN: grossly normal   LYMPHATIC: grossly normal   NEURO: grossly normal   VASCULAR: satisfactory perfusion of all extremities  MUSCULOSKELETAL:   She walks with an antalgic gait with a decreased stance phase on her left limb.  She has a well-healed anterior knee incision.  Her knee range of motion is from 7-95 degrees.  She has moderate pain with knee range of motion.  She has mild pain with hip range of motion ankle plantarflexion dorsiflexion is intact.  She has tingling and burning sensation in her DP distribution otherwise normal sensation in her foot.  Her knee is stable to varus and valgus stress with some laxity.  Her knee is stable to anterior and posterior stress.  She has moderate diffuse knee tenderness palpation.           Data:   Imaging:   Plain imaging demonstrates well fixed well-positioned cemented knee replacement implants.  No concern for loosening on the plain imaging.           Assessment and Plan:   Assessment:  Kacy is a very pleasant 48-year-old woman with a painful total knee replacement.  We discussed possible etiologies of her symptoms including infection, loosening, soft tissue injury, swelling, instability.  She has no obvious instability.  Her symptoms are not consistent with loosening at this point.  She does have slight stiffness in the knee.  My concern for infection is low.  But given the lack of recent infectious workup and her persistent pain will plan to " proceed with infectious workup.  ESR and CRP will be obtained.  She also wished to currently obtain a knee aspiration.  The aspiration was performed as noted below.  We will contact her if there is any concerning findings on the knee aspiration.  Otherwise I recommend she continue with physical therapy, strengthening, gradual return to activities.  If she has persistent pain after the 2-year valery we would consider further workup such as a bone scan.    PROCEDURE DATE: 10/11/2018    PROCEDURE NOTE:    After reviewing the risks and benefits of aspiration informed consent was obtained.  The left knee was prepped in the normal sterile fashion.  Topical anesthetic was used as necessary.  There were no complications and the patient tolerated the aspiration well.  10 ml of yellow fluid was obtained and sent for analysis.      Jason Smart M.D.     Arthritis and Joint Replacement  Department of Orthopaedic Surgery, [facility]  [email or web address]  [phone] (pager)

## 2018-10-03 NOTE — NURSING NOTE
[facility]  [address] [place] [id number]  Dept: [phone]  ______________________________________________________________________________    Patient: Kacy Herrera   : 1970   MRN: [id number]   October 3, 2018    INVASIVE PROCEDURE SAFETY CHECKLIST    Date: 10/3/2018   Procedure:Left knee aspiration  Patient Name: Kacy Herrera  MRN: [id number]  YOB: 1970    Action: Complete sections as appropriate. Any discrepancy results in a HARD COPY until resolved.     PRE PROCEDURE:  Patient ID verified with 2 identifiers (name and  or MRN): Yes  Procedure and site verified with patient/designee (when able): Yes  Accurate consent documentation in medical record: Yes  H&P (or appropriate assessment) documented in medical record: Yes  H&P must be up to 20 days prior to procedure and updates within 24 hours of procedure as applicable: Yes  Relevant diagnostic and radiology test results appropriately labeled and displayed as applicable: Yes  Procedure site(s) marked with provider initials: Yes    TIMEOUT:  Time-Out performed immediately prior to starting procedure, including verbal and active participation of all team members addressing the following:Yes  * Correct patient identify  * Confirmed that the correct side and site are marked  * An accurate procedure consent form  * Agreement on the procedure to be done  * Correct patient position  * Relevant images and results are properly labeled and appropriately displayed  * The need to administer antibiotics or fluids for irrigation purposes during the procedure as applicable   * Safety precautions based on patient history or medication use    DURING PROCEDURE: Verification of correct person, site, and procedures any time the responsibility for care of the patient is transferred to another member of the care team.

## 2018-10-03 NOTE — PROGRESS NOTES
[facility], Orthopaedic Surgery, Arthritis, Hip and Knee Replacement    Kacy Herrera MRN# [id number]   Age: 48 year old YOB: 1970     Requesting physician: Referred Self              History of Present Illness:   Kacy Herrera is a 48 year old year old female who presents today for evaluation and management of a painful left TKA.    Kacy is a very pleasant 48-year-old woman with a painful left total knee replacement.  She initially underwent a left partial knee replacement in March 2016 by Dr. Garcia.  She had pain for roughly 7 months postoperatively.  She tried injections without any improvement in her symptoms.  She subsequently got a second opinion from Dr. Mitchell.  A bone scan was eventually obtained which demonstrated increased signal concerning for loosening.  The patient subsequently underwent a conversion to a total knee replacement.  Following the total knee replacement she did extensive physical therapy.  She has persistent pain in the back of her leg.  This was initially diagnosed as sciatica type symptoms for which she tried a course of oral steroids.  This did not alleviate her symptoms.  She eventually underwent a lumbar decompression which totally reserved all of her radicular type pain.  Unfortunately she has had persistent pain and limitation in range of motion ever since.  They had initially considered proceeding with manipulation under anesthesia.  However it was thought that it was too late and the patient has not undergone any further surgery since the knee replacement.  She describes the pain is diffuse throughout her knee.  She has not had any recent infectious workup nor did she have any postoperative concern for infection or wound healing problems.  At this point she feels like her normal activities of daily living are severely limited.  She has difficulty working and has persistent pain in the wound.  Of note her total knee replacement was in November  2017.             Past Medical History:     Patient Active Problem List   Diagnosis     Pain in joint, ankle and foot     Past Medical History:   Diagnosis Date     Depressive disorder      Prior history of blood clot: none  Prior history of bleeding problems: none  Prior history of anesthetic complications: none  Currently on opioids:  none  History of Diabetes (if so, recent A1c): no           Past Surgical History:     Past Surgical History:   Procedure Laterality Date      SECTION       ORTHOPEDIC SURGERY      foot            Social History:     Social History     Social History     Marital status:      Spouse name: N/A     Number of children: N/A     Years of education: N/A     Occupational History     Not on file.     Social History Main Topics     Smoking status: Current Every Day Smoker     Packs/day: 1.00     Smokeless tobacco: Not on file     Alcohol use Yes      Comment: few times per week     Drug use: No     Sexual activity: Not on file     Other Topics Concern     Not on file     Social History Narrative     No narrative on file       Smoking: smokes 1 PPD.           Family History:     No family history on file.             Medications:     Current Outpatient Prescriptions   Medication Sig     HYDROcodone-acetaminophen (NORCO) 5-325 MG per tablet Take 1-2 tablets by mouth every 4 hours as needed for moderate to severe pain (Patient not taking: Reported on 10/3/2018)     Methylphenidate HCl (CONCERTA PO) Take by mouth daily     venlafaxine (EFFEXOR-XR) 150 MG 24 hr capsule Take 150 mg by mouth     No current facility-administered medications for this visit.             Review of Systems:   A comprehensive 10 point review of systems (constitutional, ENT, cardiac, peripheral vascular, lymphatic, respiratory, GI, , Musculoskeletal, skin, Neurological) was performed and found to be negative except as described in this note.     Also see intake form completed by patient.              "Physical Exam:     EXAMINATION pertinent findings:   VITAL SIGNS: Height 1.575 m (5' 2\"), weight 80.7 kg (178 lb).  Body mass index is 32.56 kg/(m^2).  GEN: AOx3, cooperative, no distress  RESP: non labored breathing   ABD: benign   SKIN: grossly normal   LYMPHATIC: grossly normal   NEURO: grossly normal   VASCULAR: satisfactory perfusion of all extremities  MUSCULOSKELETAL:   She walks with an antalgic gait with a decreased stance phase on her left limb.  She has a well-healed anterior knee incision.  Her knee range of motion is from 7-95 degrees.  She has moderate pain with knee range of motion.  She has mild pain with hip range of motion ankle plantarflexion dorsiflexion is intact.  She has tingling and burning sensation in her DP distribution otherwise normal sensation in her foot.  Her knee is stable to varus and valgus stress with some laxity.  Her knee is stable to anterior and posterior stress.  She has moderate diffuse knee tenderness palpation.           Data:   Imaging:   Plain imaging demonstrates well fixed well-positioned cemented knee replacement implants.  No concern for loosening on the plain imaging.           Assessment and Plan:   Assessment:  Kacy is a very pleasant 48-year-old woman with a painful total knee replacement.  We discussed possible etiologies of her symptoms including infection, loosening, soft tissue injury, swelling, instability.  She has no obvious instability.  Her symptoms are not consistent with loosening at this point.  She does have slight stiffness in the knee.  My concern for infection is low.  But given the lack of recent infectious workup and her persistent pain will plan to proceed with infectious workup.  ESR and CRP will be obtained.  She also wished to currently obtain a knee aspiration.  The aspiration was performed as noted below.  We will contact her if there is any concerning findings on the knee aspiration.  Otherwise I recommend she continue with physical " therapy, strengthening, gradual return to activities.  If she has persistent pain after the 2-year valery we would consider further workup such as a bone scan.    PROCEDURE DATE: 10/11/2018    PROCEDURE NOTE:    After reviewing the risks and benefits of aspiration informed consent was obtained.  The left knee was prepped in the normal sterile fashion.  Topical anesthetic was used as necessary.  There were no complications and the patient tolerated the aspiration well.  10 ml of yellow fluid was obtained and sent for analysis.            Jason Smart M.D.     Arthritis and Joint Replacement  Department of Orthopaedic Surgery, [facility]  [email or web address]  [phone] (pager)           Review of Systems:       Answers for HPI/ROS submitted by the patient on 10/3/2018   General Symptoms: No  Skin Symptoms: No  HENT Symptoms: No  EYE SYMPTOMS: No  HEART SYMPTOMS: No  LUNG SYMPTOMS: No  INTESTINAL SYMPTOMS: No  URINARY SYMPTOMS: No  GYNECOLOGIC SYMPTOMS: No  BREAST SYMPTOMS: No  SKELETAL SYMPTOMS: No  BLOOD SYMPTOMS: No  NERVOUS SYSTEM SYMPTOMS: No  MENTAL HEALTH SYMPTOMS: No

## 2018-10-04 LAB
APPEARANCE FLD: NORMAL
COLOR FLD: YELLOW
LYMPHOCYTES NFR FLD MANUAL: 75 %
MONOS+MACROS NFR FLD MANUAL: 23 %
NEUTS BAND NFR FLD MANUAL: 2 %
SPECIMEN SOURCE FLD: NORMAL
WBC # FLD AUTO: 80 /UL

## 2018-10-08 LAB
BACTERIA SPEC CULT: NO GROWTH
Lab: NORMAL
SPECIMEN SOURCE: NORMAL

## 2018-10-09 ENCOUNTER — TELEPHONE (OUTPATIENT)
Dept: ORTHOPEDICS | Facility: CLINIC | Age: 48
End: 2018-10-09

## 2018-10-09 DIAGNOSIS — Z96.642 STATUS POST TOTAL REPLACEMENT OF LEFT HIP: Primary | ICD-10-CM

## 2018-10-09 DIAGNOSIS — Z96.652 STATUS POST TOTAL LEFT KNEE REPLACEMENT: ICD-10-CM

## 2018-10-09 NOTE — TELEPHONE ENCOUNTER
[facility] Call Center    Phone Message    May a detailed message be left on voicemail: yes    Reason for Call: Other: Pt requesting results from tests with Dr. Smart. Please call to discuss.     Action Taken: Message routed to:  [facility] Orthopedics

## 2018-10-11 NOTE — TELEPHONE ENCOUNTER
[facility] Call Center    Phone Message    May a detailed message be left on voicemail: yes    Reason for Call: Other: It has been two days. Pt has not received a call back. Please call pt ASAP.     Action Taken: Message routed to:  [facility] Orthopedics

## 2018-10-11 NOTE — TELEPHONE ENCOUNTER
Attempted to return patient's call; left voicemail with callback information. Will inform patient that the cultures from her aspiration take 14 days to finalize and she will be notified with the final results.

## 2018-10-14 LAB — SYNOVASURE PERIPROSTHETIC JOINT INFECTION DECTION: NORMAL

## 2018-10-17 LAB
BACTERIA SPEC CULT: NORMAL
Lab: NORMAL
SPECIMEN SOURCE: NORMAL

## 2018-10-22 NOTE — TELEPHONE ENCOUNTER
[facility] Call Center    Phone Message    May a detailed message be left on voicemail: yes    Reason for Call: PT is calling back about final results.    Action Taken: Message routed to:  [facility] ORTHO

## 2018-10-23 NOTE — TELEPHONE ENCOUNTER
Returned patient's call. Left message stating patient will receive a call tomorrow when Dr. Smart is in clinic.

## 2018-10-23 NOTE — TELEPHONE ENCOUNTER
[facility] Call Center    Phone Message    May a detailed message be left on voicemail: yes    Reason for Call: Other: Pt would like call back after 130 pm tomorrow. That is when she is available.      Action Taken: Message routed to:  [facility] Orthopedics

## 2018-10-24 NOTE — TELEPHONE ENCOUNTER
[facility] Call Center    Phone Message    May a detailed message be left on voicemail: yes    Reason for Call: Other: Waiting to hear from Dr. Smart.     Action Taken: Message routed to:  [facility] ump ortho

## 2018-10-25 NOTE — TELEPHONE ENCOUNTER
Patient called requesting results. Dr. Smart attempted to call patient during clinic Wednesday, 10/25 without reaching patient. Patient stated that she did not receive calls; it was determined that the incorrect number was called. Relayed result information from Dr. Smart to patient. Her labs look good. He recommends doing PT and returning to clinic in 3 to 4 months if it is still bothering her. Patient expressed understanding. Physical therapy orders placed; patient wishes to attend PT at [facility].

## 2020-04-22 ENCOUNTER — APPOINTMENT (OUTPATIENT)
Dept: ULTRASOUND IMAGING | Facility: CLINIC | Age: 50
End: 2020-04-22
Attending: EMERGENCY MEDICINE
Payer: COMMERCIAL

## 2020-04-22 ENCOUNTER — HOSPITAL ENCOUNTER (OUTPATIENT)
Facility: CLINIC | Age: 50
Setting detail: OBSERVATION
Discharge: HOME | End: 2020-04-24
Attending: EMERGENCY MEDICINE | Admitting: INTERNAL MEDICINE
Payer: COMMERCIAL

## 2020-04-22 DIAGNOSIS — K80.43 CHOLEDOCHOLITHIASIS WITH ACUTE CHOLECYSTITIS WITH OBSTRUCTION: ICD-10-CM

## 2020-04-22 DIAGNOSIS — R79.89 ELEVATED LFTS: ICD-10-CM

## 2020-04-22 DIAGNOSIS — R17 ELEVATED BILIRUBIN: ICD-10-CM

## 2020-04-22 LAB
BASOPHILS # BLD AUTO: 0 10E9/L (ref 0–0.2)
BASOPHILS NFR BLD AUTO: 0.2 %
DIFFERENTIAL METHOD BLD: NORMAL
EOSINOPHIL # BLD AUTO: 0.2 10E9/L (ref 0–0.7)
EOSINOPHIL NFR BLD AUTO: 2.1 %
ERYTHROCYTE [DISTWIDTH] IN BLOOD BY AUTOMATED COUNT: 13.3 % (ref 10–15)
HCT VFR BLD AUTO: 43.5 % (ref 35–47)
HGB BLD-MCNC: 14.3 G/DL (ref 11.7–15.7)
IMM GRANULOCYTES # BLD: 0 10E9/L (ref 0–0.4)
IMM GRANULOCYTES NFR BLD: 0.3 %
LYMPHOCYTES # BLD AUTO: 2.7 10E9/L (ref 0.8–5.3)
LYMPHOCYTES NFR BLD AUTO: 27.9 %
MCH RBC QN AUTO: 29.9 PG (ref 26.5–33)
MCHC RBC AUTO-ENTMCNC: 32.9 G/DL (ref 31.5–36.5)
MCV RBC AUTO: 91 FL (ref 78–100)
MONOCYTES # BLD AUTO: 0.6 10E9/L (ref 0–1.3)
MONOCYTES NFR BLD AUTO: 5.8 %
NEUTROPHILS # BLD AUTO: 6.1 10E9/L (ref 1.6–8.3)
NEUTROPHILS NFR BLD AUTO: 63.7 %
NRBC # BLD AUTO: 0 10*3/UL
NRBC BLD AUTO-RTO: 0 /100
PLATELET # BLD AUTO: 272 10E9/L (ref 150–450)
RBC # BLD AUTO: 4.78 10E12/L (ref 3.8–5.2)
WBC # BLD AUTO: 9.6 10E9/L (ref 4–11)

## 2020-04-22 PROCEDURE — 80053 COMPREHEN METABOLIC PANEL: CPT | Performed by: EMERGENCY MEDICINE

## 2020-04-22 PROCEDURE — 76705 ECHO EXAM OF ABDOMEN: CPT

## 2020-04-22 PROCEDURE — 99285 EMERGENCY DEPT VISIT HI MDM: CPT | Mod: 25

## 2020-04-22 PROCEDURE — 85025 COMPLETE CBC W/AUTO DIFF WBC: CPT | Performed by: EMERGENCY MEDICINE

## 2020-04-22 PROCEDURE — 83690 ASSAY OF LIPASE: CPT | Performed by: EMERGENCY MEDICINE

## 2020-04-23 ENCOUNTER — APPOINTMENT (OUTPATIENT)
Dept: MRI IMAGING | Facility: CLINIC | Age: 50
End: 2020-04-23
Attending: EMERGENCY MEDICINE
Payer: COMMERCIAL

## 2020-04-23 ENCOUNTER — ANESTHESIA EVENT (OUTPATIENT)
Dept: SURGERY | Facility: CLINIC | Age: 50
End: 2020-04-23
Payer: COMMERCIAL

## 2020-04-23 PROBLEM — K80.50 BILIARY COLIC: Status: ACTIVE | Noted: 2020-04-23

## 2020-04-23 LAB
ALBUMIN SERPL-MCNC: 3.4 G/DL (ref 3.4–5)
ALBUMIN SERPL-MCNC: 3.5 G/DL (ref 3.4–5)
ALBUMIN SERPL-MCNC: 3.5 G/DL (ref 3.4–5)
ALP SERPL-CCNC: 301 U/L (ref 40–150)
ALP SERPL-CCNC: 307 U/L (ref 40–150)
ALP SERPL-CCNC: 321 U/L (ref 40–150)
ALT SERPL W P-5'-P-CCNC: 646 U/L (ref 0–50)
ALT SERPL W P-5'-P-CCNC: 659 U/L (ref 0–50)
ALT SERPL W P-5'-P-CCNC: 737 U/L (ref 0–50)
ANION GAP SERPL CALCULATED.3IONS-SCNC: 1 MMOL/L (ref 3–14)
ANION GAP SERPL CALCULATED.3IONS-SCNC: 4 MMOL/L (ref 3–14)
ANION GAP SERPL CALCULATED.3IONS-SCNC: 4 MMOL/L (ref 3–14)
AST SERPL W P-5'-P-CCNC: 419 U/L (ref 0–45)
AST SERPL W P-5'-P-CCNC: 439 U/L (ref 0–45)
AST SERPL W P-5'-P-CCNC: 465 U/L (ref 0–45)
BILIRUB SERPL-MCNC: 0.9 MG/DL (ref 0.2–1.3)
BILIRUB SERPL-MCNC: 1.9 MG/DL (ref 0.2–1.3)
BILIRUB SERPL-MCNC: 2.2 MG/DL (ref 0.2–1.3)
BUN SERPL-MCNC: 13 MG/DL (ref 7–30)
BUN SERPL-MCNC: 13 MG/DL (ref 7–30)
BUN SERPL-MCNC: 14 MG/DL (ref 7–30)
CALCIUM SERPL-MCNC: 8.7 MG/DL (ref 8.5–10.1)
CALCIUM SERPL-MCNC: 9.1 MG/DL (ref 8.5–10.1)
CALCIUM SERPL-MCNC: 9.1 MG/DL (ref 8.5–10.1)
CHLORIDE SERPL-SCNC: 103 MMOL/L (ref 94–109)
CHLORIDE SERPL-SCNC: 104 MMOL/L (ref 94–109)
CHLORIDE SERPL-SCNC: 106 MMOL/L (ref 94–109)
CO2 SERPL-SCNC: 27 MMOL/L (ref 20–32)
CO2 SERPL-SCNC: 30 MMOL/L (ref 20–32)
CO2 SERPL-SCNC: 32 MMOL/L (ref 20–32)
CREAT SERPL-MCNC: 0.59 MG/DL (ref 0.52–1.04)
CREAT SERPL-MCNC: 0.64 MG/DL (ref 0.52–1.04)
CREAT SERPL-MCNC: 0.76 MG/DL (ref 0.52–1.04)
GFR SERPL CREATININE-BSD FRML MDRD: >90 ML/MIN/{1.73_M2}
GLUCOSE SERPL-MCNC: 101 MG/DL (ref 70–99)
GLUCOSE SERPL-MCNC: 104 MG/DL (ref 70–99)
GLUCOSE SERPL-MCNC: 104 MG/DL (ref 70–99)
LIPASE SERPL-CCNC: 37 U/L (ref 73–393)
POTASSIUM SERPL-SCNC: 3.9 MMOL/L (ref 3.4–5.3)
POTASSIUM SERPL-SCNC: 4.4 MMOL/L (ref 3.4–5.3)
POTASSIUM SERPL-SCNC: 6.1 MMOL/L (ref 3.4–5.3)
PROT SERPL-MCNC: 7 G/DL (ref 6.8–8.8)
PROT SERPL-MCNC: 7 G/DL (ref 6.8–8.8)
PROT SERPL-MCNC: 7.2 G/DL (ref 6.8–8.8)
SODIUM SERPL-SCNC: 134 MMOL/L (ref 133–144)
SODIUM SERPL-SCNC: 138 MMOL/L (ref 133–144)
SODIUM SERPL-SCNC: 139 MMOL/L (ref 133–144)

## 2020-04-23 PROCEDURE — 96376 TX/PRO/DX INJ SAME DRUG ADON: CPT | Mod: XS

## 2020-04-23 PROCEDURE — 99214 OFFICE O/P EST MOD 30 MIN: CPT | Mod: 57 | Performed by: SURGERY

## 2020-04-23 PROCEDURE — 25000128 H RX IP 250 OP 636: Performed by: EMERGENCY MEDICINE

## 2020-04-23 PROCEDURE — 99207 ZZC APP CREDIT; MD BILLING SHARED VISIT: CPT | Performed by: PHYSICIAN ASSISTANT

## 2020-04-23 PROCEDURE — 80053 COMPREHEN METABOLIC PANEL: CPT | Performed by: EMERGENCY MEDICINE

## 2020-04-23 PROCEDURE — 80053 COMPREHEN METABOLIC PANEL: CPT | Mod: 91 | Performed by: INTERNAL MEDICINE

## 2020-04-23 PROCEDURE — 96361 HYDRATE IV INFUSION ADD-ON: CPT

## 2020-04-23 PROCEDURE — 99220 ZZC INITIAL OBSERVATION CARE,LEVL III: CPT | Performed by: INTERNAL MEDICINE

## 2020-04-23 PROCEDURE — 96365 THER/PROPH/DIAG IV INF INIT: CPT | Mod: 59

## 2020-04-23 PROCEDURE — 25000128 H RX IP 250 OP 636: Performed by: INTERNAL MEDICINE

## 2020-04-23 PROCEDURE — 25800030 ZZH RX IP 258 OP 636: Performed by: INTERNAL MEDICINE

## 2020-04-23 PROCEDURE — 25000132 ZZH RX MED GY IP 250 OP 250 PS 637: Performed by: INTERNAL MEDICINE

## 2020-04-23 PROCEDURE — G0378 HOSPITAL OBSERVATION PER HR: HCPCS

## 2020-04-23 PROCEDURE — 74183 MRI ABD W/O CNTR FLWD CNTR: CPT

## 2020-04-23 PROCEDURE — A9585 GADOBUTROL INJECTION: HCPCS | Performed by: EMERGENCY MEDICINE

## 2020-04-23 PROCEDURE — 96375 TX/PRO/DX INJ NEW DRUG ADDON: CPT

## 2020-04-23 PROCEDURE — 96366 THER/PROPH/DIAG IV INF ADDON: CPT

## 2020-04-23 PROCEDURE — 25500064 ZZH RX 255 OP 636: Performed by: EMERGENCY MEDICINE

## 2020-04-23 PROCEDURE — 25800030 ZZH RX IP 258 OP 636: Performed by: EMERGENCY MEDICINE

## 2020-04-23 PROCEDURE — 36415 COLL VENOUS BLD VENIPUNCTURE: CPT | Performed by: INTERNAL MEDICINE

## 2020-04-23 RX ORDER — POLYETHYLENE GLYCOL 3350 17 G/17G
17 POWDER, FOR SOLUTION ORAL DAILY PRN
Status: DISCONTINUED | OUTPATIENT
Start: 2020-04-23 | End: 2020-04-24 | Stop reason: HOSPADM

## 2020-04-23 RX ORDER — ACETAMINOPHEN 325 MG/1
650 TABLET ORAL EVERY 4 HOURS PRN
Status: DISCONTINUED | OUTPATIENT
Start: 2020-04-23 | End: 2020-04-24 | Stop reason: HOSPADM

## 2020-04-23 RX ORDER — ONDANSETRON 2 MG/ML
4 INJECTION INTRAMUSCULAR; INTRAVENOUS ONCE
Status: COMPLETED | OUTPATIENT
Start: 2020-04-23 | End: 2020-04-23

## 2020-04-23 RX ORDER — CEFTRIAXONE 2 G/1
2 INJECTION, POWDER, FOR SOLUTION INTRAMUSCULAR; INTRAVENOUS EVERY 24 HOURS
Status: DISCONTINUED | OUTPATIENT
Start: 2020-04-23 | End: 2020-04-24 | Stop reason: HOSPADM

## 2020-04-23 RX ORDER — NALOXONE HYDROCHLORIDE 0.4 MG/ML
.1-.4 INJECTION, SOLUTION INTRAMUSCULAR; INTRAVENOUS; SUBCUTANEOUS
Status: DISCONTINUED | OUTPATIENT
Start: 2020-04-23 | End: 2020-04-24 | Stop reason: HOSPADM

## 2020-04-23 RX ORDER — OXYCODONE HYDROCHLORIDE 5 MG/1
5 TABLET ORAL EVERY 4 HOURS PRN
Status: DISCONTINUED | OUTPATIENT
Start: 2020-04-23 | End: 2020-04-24 | Stop reason: HOSPADM

## 2020-04-23 RX ORDER — ONDANSETRON 4 MG/1
4 TABLET, ORALLY DISINTEGRATING ORAL EVERY 6 HOURS PRN
Status: DISCONTINUED | OUTPATIENT
Start: 2020-04-23 | End: 2020-04-24 | Stop reason: HOSPADM

## 2020-04-23 RX ORDER — AMOXICILLIN 250 MG
1 CAPSULE ORAL 2 TIMES DAILY
Status: DISCONTINUED | OUTPATIENT
Start: 2020-04-23 | End: 2020-04-24 | Stop reason: HOSPADM

## 2020-04-23 RX ORDER — NICOTINE 21 MG/24HR
1 PATCH, TRANSDERMAL 24 HOURS TRANSDERMAL DAILY
Status: DISCONTINUED | OUTPATIENT
Start: 2020-04-23 | End: 2020-04-24 | Stop reason: HOSPADM

## 2020-04-23 RX ORDER — CEFTRIAXONE 2 G/1
2 INJECTION, POWDER, FOR SOLUTION INTRAMUSCULAR; INTRAVENOUS ONCE
Status: COMPLETED | OUTPATIENT
Start: 2020-04-23 | End: 2020-04-23

## 2020-04-23 RX ORDER — AMOXICILLIN 250 MG
2 CAPSULE ORAL 2 TIMES DAILY PRN
Status: DISCONTINUED | OUTPATIENT
Start: 2020-04-23 | End: 2020-04-24 | Stop reason: HOSPADM

## 2020-04-23 RX ORDER — MORPHINE SULFATE 4 MG/ML
4 INJECTION, SOLUTION INTRAMUSCULAR; INTRAVENOUS ONCE
Status: COMPLETED | OUTPATIENT
Start: 2020-04-23 | End: 2020-04-23

## 2020-04-23 RX ORDER — ONDANSETRON 2 MG/ML
4 INJECTION INTRAMUSCULAR; INTRAVENOUS EVERY 6 HOURS PRN
Status: DISCONTINUED | OUTPATIENT
Start: 2020-04-23 | End: 2020-04-24 | Stop reason: HOSPADM

## 2020-04-23 RX ORDER — AMOXICILLIN 250 MG
1 CAPSULE ORAL 2 TIMES DAILY PRN
Status: DISCONTINUED | OUTPATIENT
Start: 2020-04-23 | End: 2020-04-24 | Stop reason: HOSPADM

## 2020-04-23 RX ORDER — ACETAMINOPHEN 650 MG/1
650 SUPPOSITORY RECTAL EVERY 4 HOURS PRN
Status: DISCONTINUED | OUTPATIENT
Start: 2020-04-23 | End: 2020-04-23

## 2020-04-23 RX ORDER — HYDROMORPHONE HYDROCHLORIDE 1 MG/ML
.2-.4 INJECTION, SOLUTION INTRAMUSCULAR; INTRAVENOUS; SUBCUTANEOUS
Status: DISCONTINUED | OUTPATIENT
Start: 2020-04-23 | End: 2020-04-24 | Stop reason: HOSPADM

## 2020-04-23 RX ORDER — AMOXICILLIN 250 MG
2 CAPSULE ORAL 2 TIMES DAILY
Status: DISCONTINUED | OUTPATIENT
Start: 2020-04-23 | End: 2020-04-24 | Stop reason: HOSPADM

## 2020-04-23 RX ORDER — GADOBUTROL 604.72 MG/ML
10 INJECTION INTRAVENOUS ONCE
Status: COMPLETED | OUTPATIENT
Start: 2020-04-23 | End: 2020-04-23

## 2020-04-23 RX ADMIN — MORPHINE SULFATE 4 MG: 4 INJECTION INTRAVENOUS at 01:31

## 2020-04-23 RX ADMIN — SENNOSIDES AND DOCUSATE SODIUM 2 TABLET: 8.6; 5 TABLET ORAL at 20:39

## 2020-04-23 RX ADMIN — OXYCODONE HYDROCHLORIDE 5 MG: 5 TABLET ORAL at 08:11

## 2020-04-23 RX ADMIN — GADOBUTROL 8 ML: 604.72 INJECTION INTRAVENOUS at 02:14

## 2020-04-23 RX ADMIN — HYDROMORPHONE HYDROCHLORIDE 0.2 MG: 1 INJECTION, SOLUTION INTRAMUSCULAR; INTRAVENOUS; SUBCUTANEOUS at 03:10

## 2020-04-23 RX ADMIN — OXYCODONE HYDROCHLORIDE 5 MG: 5 TABLET ORAL at 12:15

## 2020-04-23 RX ADMIN — ONDANSETRON 4 MG: 2 INJECTION INTRAMUSCULAR; INTRAVENOUS at 01:30

## 2020-04-23 RX ADMIN — SENNOSIDES AND DOCUSATE SODIUM 2 TABLET: 8.6; 5 TABLET ORAL at 08:11

## 2020-04-23 RX ADMIN — NICOTINE 1 PATCH: 21 PATCH, EXTENDED RELEASE TRANSDERMAL at 08:09

## 2020-04-23 RX ADMIN — HYDROMORPHONE HYDROCHLORIDE 0.4 MG: 1 INJECTION, SOLUTION INTRAMUSCULAR; INTRAVENOUS; SUBCUTANEOUS at 06:06

## 2020-04-23 RX ADMIN — SODIUM CHLORIDE 1000 ML: 9 INJECTION, SOLUTION INTRAVENOUS at 01:29

## 2020-04-23 RX ADMIN — CEFTRIAXONE 2 G: 2 INJECTION, POWDER, FOR SOLUTION INTRAMUSCULAR; INTRAVENOUS at 22:51

## 2020-04-23 RX ADMIN — NICOTINE 1 PATCH: 21 PATCH, EXTENDED RELEASE TRANSDERMAL at 06:06

## 2020-04-23 RX ADMIN — CEFTRIAXONE 2 G: 2 INJECTION, POWDER, FOR SOLUTION INTRAMUSCULAR; INTRAVENOUS at 00:57

## 2020-04-23 RX ADMIN — DEXTROSE AND SODIUM CHLORIDE: 5; 900 INJECTION, SOLUTION INTRAVENOUS at 03:10

## 2020-04-23 ASSESSMENT — MIFFLIN-ST. JEOR: SCORE: 1382

## 2020-04-23 NOTE — CONSULTS
"[facility] Consultation    We were asked to see this patient in consultation by Dr. Nathan for evaluation of CBD stone.    HPI: Kacy Herrera is a 50 year old female with cholelithiasis  admitted on 4/22/2020. She has been having intermittent epigastric and upper abdominal pain x last few days, escalating last night and came in. She denies fever, chills, jaundice or dark urine. She had elevated LFTs with bilirubin of 2.2 mg/dl. MRCP showed evidence of a 5 mm non-obstructing CBD stone.    ROS:   no chest pain, no SOB, all other ROS negative except for what is noted in HPI  Past Medical History:   Diagnosis Date     Cholelithiasis      Depressive disorder      Hypertension      SHx:  Smokes tobacco  FHx:  No fh/o CRC    Home medications:   Medications Prior to Admission   Medication Sig Dispense Refill Last Dose     venlafaxine (EFFEXOR-XR) 150 MG 24 hr capsule Take 150 mg by mouth daily    4/22/2020 at Unknown time     Allergies:  Allergies   Allergen Reactions     Zithromax [Azithromycin] Hives       PHYSICAL EXAM:   Appearance:   well developed well nourished female in no acute distress, alert and oriented X 3  /78 (BP Location: Left arm)   Pulse 91   Temp 97.4  F (36.3  C) (Oral)   Resp 16   Ht 1.575 m (5' 2\")   Wt 81.4 kg (179 lb 6.4 oz)   SpO2 98%   BMI 32.81 kg/m    Eyes:    no slceral icterus  Ears, nose, throat:  within normal limits for age  Lymph nodes (cervical):   not enlarged, nontender  Respirations:   unlabored  Lungs:   Clear to auscultation  Heart:  regular rate and rhythm  Musculo-skelatal (lower extremities):   no edema, no joint swelling, no erythema, no increased warmth  Gastrointestinal:   nondistended, +BS, soft,   Skin:   clear, without lesions    LABS:    [unfilled]  [unfilled]  [unfilled]  [unfilled]  Radiology:     MRCP IMPRESSION:  1.  0.5 cm calculus in the distal common bile duct. There is no evidence of significant biliary obstruction due to " calculus. The common duct is at the upper limits of normal in caliber, measuring 0.6 cm in diameter. No intrahepatic biliary dilatation.  2.  Cholelithiasis.  Consultation Assessment & Plan:     Cholelithiasis with choledocholithiasis: No evidence of cholangitis or pancreatitis. Bilirubin is improving, transaminases and Alk phose are still elevated.     -- ERCP tomorrow AM ( no OR availability today).   -- Evaluate for cholecystectomy.  -- NPO at midnight.        Thank you for the opportunity in letting us participate in the care of Kacy HESS Richardtad.    Margie Severino MD

## 2020-04-23 NOTE — CONSULTS
Chief complaint:  Abdominal pain, epigastric    HPI:  This patient is a 50 year old female who presents with 2 to 3 days of epigastric pain radiating through to the back.  This felt similar to heartburn, but the pain got quite severe yesterday.  She did not have relief from Tums or Pepto-Bismol.  She reports that she does have a family history of gallstones.  The patient denied nausea or vomiting.  She denies fever or chills.  She presented to the emergency room yesterday, where she was found to have mild to moderate elevation of liver function tests.  These actually worsened on recheck and an MRCP was obtained.  This revealed a stone within the common duct.  The patient feels significantly better today.  She has been seen by the GI service, who are planning on performing ERCP tomorrow.    Past Medical History:   has a past medical history of Cholelithiasis, Depressive disorder, and Hypertension.    Past Surgical History:  Past Surgical History:   Procedure Laterality Date      SECTION       FOOT SURGERY       JOINT REPLACEMENT (aka KNEE) NOS Left         Social History:  Social History     Socioeconomic History     Marital status:      Spouse name: Not on file     Number of children: Not on file     Years of education: Not on file     Highest education level: Not on file   Occupational History     Not on file   Social Needs     Financial resource strain: Not on file     Food insecurity     Worry: Not on file     Inability: Not on file     Transportation needs     Medical: Not on file     Non-medical: Not on file   Tobacco Use     Smoking status: Current Every Day Smoker     Packs/day: 1.00   Substance and Sexual Activity     Alcohol use: Yes     Comment: few times per week     Drug use: No     Sexual activity: Not on file   Lifestyle     Physical activity     Days per week: Not on file     Minutes per session: Not on file     Stress: Not on file   Relationships     Social connections     Talks on  phone: Not on file     Gets together: Not on file     Attends Anabaptist service: Not on file     Active member of club or organization: Not on file     Attends meetings of clubs or organizations: Not on file     Relationship status: Not on file     Intimate partner violence     Fear of current or ex partner: Not on file     Emotionally abused: Not on file     Physically abused: Not on file     Forced sexual activity: Not on file   Other Topics Concern     Not on file   Social History Narrative     Not on file        Family History:  History reviewed. No pertinent family history.    Review of Systems:  The 10 point Review of Systems is negative other than noted in the HPI and above.    Physical Exam:  General - This is a well developed, well nourished female in no apparent distress.  HEENT - Normocephalic, atraumatic.  No scleral icterus.  Neck - supple without masses  Lungs - clear to ascultation.    Heart - Regular rate & rhythm without murmur  Abdomen:   soft, non-distended with minimal epigastric tenderness.    Extremities - warm without edema  Neurologic - nonfocal  Skin shows no evidence of jaundice.  Psychiatric-the patient shows good insight into her situation.    Relevant labs:  This morning labs reveal a total bilirubin of 0.9, which is improved.  Alkaline phosphatase is 321, ALT is 737 and AST is 465.  All of these values are slightly increased from yesterday.    Imaging:  Ultrasound shows gallbladder wall thickening to 7 mm.  Gallstones are present within the gallbladder.  MRCP reveals a stone in the distal common duct.    Assessment and Plan:  This is a patient with cholelithiasis and choledocholithiasis.  A plan has been made for ERCP tomorrow morning.  We have been asked to consider concurrent laparoscopic cholecystectomy.  I discussed the procedure, along with its risks and complications, in detail with the patient.  She is interested in proceeding with laparoscopic cholecystectomy during the same  anesthetic as her ERCP.  This will be done by Dr. Purcell tomorrow morning.  It should be fine for the patient to eat a low-fat diet between now and midnight.    Louis Patel MD  [facility]

## 2020-04-23 NOTE — PLAN OF CARE
PRIMARY DIAGNOSIS: BILIARY COLIC  OUTPATIENT/OBSERVATION GOALS TO BE MET BEFORE DISCHARGE:    1. Pain status: Improved but still requiring IV narcotics.  2. Stable vital signs and labs (if performed) at disposition: Yes  3. Tolerating adequate PO diet: Currently NPO  4. Successful cholecystectomy or clear follow up plan with General Surgery team if immediate surgery not performed: General surgery and GI consulted  5. ADLs back to baseline?  Yes  6. Activity and level of assistance: Up independently  7. Barriers to discharge noted No    Discharge Planner Nurse   Safe discharge environment identified: Yes  Barriers to discharge: Yes       Entered by: Fransisco Hector 04/23/2020 4:05 AM    Vitals are Temp: 96.3  F (35.7  C) Temp src: Oral BP: 135/82 Pulse: 91 Heart Rate: 99 Resp: 20 SpO2: 95 %.  AxOx4. Up independently. On strict NPO diet. 4/10 epigastric pain, radiating to back. IV dilaudid given. IVFs at 100 mL/hr. Denies dizziness, SOB, and nausea. Voiding adequately. CMS intact. Will continue to monitor and provide supportive cares.       Please review provider order for any additional goals.   Nurse to notify provider when observation goals have been met and patient is ready for discharge.

## 2020-04-23 NOTE — ED TRIAGE NOTES
Patient arrives via EMS from home with complaints of epigastric pain and burning. Pain radiates to back. Endorses dark urine.

## 2020-04-23 NOTE — ED NOTES
[facility]  ED Nurse Handoff Report    Kacy Herrera is a 50 year old female   ED Chief complaint: Abdominal Pain  . ED Diagnosis:   Final diagnoses:   Choledocholithiasis with acute cholecystitis with obstruction   Elevated LFTs   Elevated bilirubin     Allergies:   Allergies   Allergen Reactions     Zithromax [Azithromycin] Hives       Code Status: Full Code  Activity level - Baseline/Home:  Independent. Activity Level - Current:   Independent. Lift room needed: No. Bariatric: No   Needed: No   Isolation: No. Infection: Not Applicable.     Vital Signs:   Vitals:    04/22/20 2300 04/22/20 2331 04/22/20 2345 04/23/20 0055   BP: (!) 135/90 (!) 137/90 (!) 146/79    Pulse: 90 84 91    Resp:       Temp:       TempSrc:       SpO2: 97% 98% 99% 100%       Cardiac Rhythm:  ,      Pain level: 0-10 Pain Scale: 6  Patient confused: No. Patient Falls Risk: No.   Elimination Status: Has voided   Patient Report - Initial Complaint: Abdominal Pain. Focused Assessment: Patient reports significant epigastric pain for several days. Denies N/V/D. Reports decreased appetite and increased bloating. Denies any fevers or chills.   Tests Performed: Lab work, Ultrasound, EKG. Abnormal Results:   Labs Ordered and Resulted from Time of ED Arrival Up to the Time of Departure from the ED   LIPASE - Abnormal; Notable for the following components:       Result Value    Lipase 37 (*)     All other components within normal limits   COMPREHENSIVE METABOLIC PANEL - Abnormal; Notable for the following components:    Potassium 6.1 (*)     Glucose 104 (*)     Bilirubin Total 1.9 (*)     Alkaline Phosphatase 301 (*)      (*)      (*)     All other components within normal limits   COMPREHENSIVE METABOLIC PANEL - Abnormal; Notable for the following components:    Glucose 104 (*)     Bilirubin Total 2.2 (*)     Alkaline Phosphatase 307 (*)      (*)      (*)     All other components within normal  limits   CBC WITH PLATELETS DIFFERENTIAL   PERIPHERAL IV CATHETER     ,  US Abdomen Limited   Final Result   IMPRESSION:   1.  Cholelithiasis with multiple small stones and sludge material throughout the gallbladder lumen with gallbladder distention and gallbladder wall thickening at 7 mm. Findings are suspicious for acute cholecystitis. No pericholecystic fluid.      2.  No biliary dilatation.      3.  No hydronephrosis.      MR Abdomen MRCP w/o & w Contrast    (Results Pending)     .   Treatments provided: See MAR  Family Comments: N/A  OBS brochure/video discussed/provided to patient:  Yes  ED Medications:   Medications   cefTRIAXone (ROCEPHIN) 2 g vial to attach to  ml bag for ADULTS or NS 50 ml bag for PEDS (2 g Intravenous New Bag 4/23/20 0057)   morphine (PF) injection 4 mg (4 mg Intravenous Given 4/23/20 0131)   ondansetron (ZOFRAN) injection 4 mg (4 mg Intravenous Given 4/23/20 0130)   0.9% sodium chloride BOLUS (1,000 mLs Intravenous New Bag 4/23/20 0129)   gadobutrol (GADAVIST) injection 10 mL (8 mLs Intravenous Given 4/23/20 0214)   sodium chloride (PF) 0.9% PF flush 60 mL (60 mLs Intravenous Given 4/23/20 0214)     Drips infusing:  Yes  For the majority of the shift, the patient's behavior Green. Interventions performed were N/A.    Sepsis treatment initiated: No       ED Nurse Name/Phone Number: Gianna Moore RN,   12:55 AM  RECEIVING UNIT ED HANDOFF REVIEW      Above ED Nurse Handoff Report was reviewed: Yes  Reviewed by: Fransisco Hector on April 23, 2020 at 2:34 AM

## 2020-04-23 NOTE — PLAN OF CARE
ROOM # 207    Living Situation (if not independent, order SW consult): home w/ and children  Facility name:  : [facility],   [place], friend    Activity level at baseline: Ind  Activity level on admit: Ind      Patient registered to observation; given Patient Bill of Rights; given the opportunity to ask questions about observation status and their plan of care.  Patient has been oriented to the observation room, bathroom and call light is in place.    Discussed discharge goals and expectations with patient/family.

## 2020-04-23 NOTE — PHARMACY-ADMISSION MEDICATION HISTORY
Admission medication history interview status for this patient is complete. See [facility] admission navigator for allergy information, prior to admission medications and immunization status.     Medication history interview done via telephone during Covid-19 pandemic, indicate source(s): Patient  Medication history resources (including written lists, pill bottles, clinic record):[facility] list  Primary pharmacy:[facility], [place]    Changes made to PTA medication list:  Added: -  Deleted: concerta  Changed: None    Actions taken by pharmacist (provider contacted, etc):None     Additional medication history information:None    Medication reconciliation/reorder completed by provider prior to medication history?  N  (Y/N)     For patients on insulin therapy: N  (Y/N)      Prior to Admission medications    Medication Sig Last Dose Taking? Auth Provider   venlafaxine (EFFEXOR-XR) 150 MG 24 hr capsule Take 150 mg by mouth daily  4/22/2020 at Unknown time Yes Reported, Patient

## 2020-04-23 NOTE — PROGRESS NOTES
"PRIMARY DIAGNOSIS: BILIARY COLIC/UNCOMPLICATED EARLY ACUTE CHOLECYSTITIS  OUTPATIENT/OBSERVATION GOALS TO BE MET BEFORE DISCHARGE:     1. Pain status: Improved but still requiring Oxycodone  2. Stable vital signs and labs (if performed) at disposition: No  3. Tolerating adequate PO diet: Yes low fat diet  4. Successful cholecystectomy or clear follow up plan with General Surgery team if immediate surgery not performed surgery tomorrow  5. ADLs back to baseline?  Yes  6. Activity and level of assistance: Ambulating independently.  7. Barriers to discharge noted Yes surgery tomorrow.         Discharge Planner Nurse      Safe discharge environment identified: Yes  Barriers to discharge: Yes       Entered by: Dolores Patel 04/23/2020 7351     Please review provider order for any additional goals.   Nurse to notify provider when observation goals have been met and patient is ready for discharge.     Patient is alert and oriented x4. VS WNL and documented on the FS. Lung sounds clear in all lobes and patient is on RA. Denies SOB. Active bowel sounds in all 4 quadrants with LBM yesterday. Patient denies any pain, urgency, and frequency when voiding. Patient rating abdominal pain a 3/10 and got Oxycodone 5mg. Patient can also have IV Dilaudid. SL and on a low fat diet.  NPO at midnight. Independent in room. Plan: Lap cholecystectomy and ERCP tomorrow morning.     BP (!) 147/78 (BP Location: Right arm)   Pulse 85   Temp 98  F (36.7  C) (Oral)   Resp 16   Ht 1.575 m (5' 2\")   Wt 81.4 kg (179 lb 6.4 oz)   SpO2 94%   BMI 32.81 kg/m      "

## 2020-04-23 NOTE — PROGRESS NOTES
"PRIMARY DIAGNOSIS: BILIARY COLIC/UNCOMPLICATED EARLY ACUTE CHOLECYSTITIS  OUTPATIENT/OBSERVATION GOALS TO BE MET BEFORE DISCHARGE:    1. Pain status: Improved but still requiring IV narcotics.  2. Stable vital signs and labs (if performed) at disposition: No  3. Tolerating adequate PO diet: No  4. Successful cholecystectomy or clear follow up plan with General Surgery team if immediate surgery not performed surgery tomorrow  5. ADLs back to baseline?  Yes  6. Activity and level of assistance: Ambulating independently.  7. Barriers to discharge noted Yes surgery tomorrow.     Discharge Planner Nurse   Safe discharge environment identified: Yes  Barriers to discharge: Yes       Entered by: Dolores Patel 04/23/2020 10:15 AM     Please review provider order for any additional goals.   Nurse to notify provider when observation goals have been met and patient is ready for discharge.    Patient is alert and oriented x4. VS WNL and documented on the FS. Lung sounds clear in all lobes and patient is on RA. Denies SOB. Active bowel sounds in all 4 quadrants with LBM yesterday. Patient denies any pain, urgency, and frequency when voiding. Patient rating abdominal pain a 3/10 and got Oxycodone 5mg at 0811. Patient can also have IV Dilaudid. Patient continues on IV Rocephin with IV fluids infusing at 100 ml/hr. NPO. Independent in room. Plan: Lap cholecystectomy and ERCP tomorrow morning.     /78 (BP Location: Left arm)   Pulse 91   Temp 97.4  F (36.3  C) (Oral)   Resp 16   Ht 1.575 m (5' 2\")   Wt 81.4 kg (179 lb 6.4 oz)   SpO2 98%   BMI 32.81 kg/m      "

## 2020-04-23 NOTE — PROGRESS NOTES
"[facility]  Hospitalist Progress Note  Carlita Hanson PA-C 04/23/2020    Reason for Stay (Diagnosis): biliary colic          Assessment and Plan:      Summary of Stay: Kacy Herrera is a pleasant 50-year-old female with history of depression and hypertension who presented with abdominal pain for several days, got worse in the last 2 days and increasing intractable pain. She was found to have cholelithiasis with evidence of choledocholithiasis due to elevated LFTs and confirmed by MRCP (0.5 cm stone in the distal CBD, +GS, normal pancreas).     Problem List:   1. Cholelithiasis with evidence of choledocholithiasis  --Pt remains HD stable, no evidence of cholangitis  --LFTs declining today with normal TBili possibly have passed the stone  --Appreciate GI and GS evaluation today  --Plan for Lap sebastián tomorrow with IOC and ERCP  --Cont IV rocephin for now  --Low fat diet today and NPO after MN   --PRN antiemetics and pain meds, if increased pain after eating then place NPO      2. Depression   --Hold Effexor for now    DVT Prophylaxis: Low Risk/Ambulatory with no VTE prophylaxis indicated  Code Status: Full Code  Discharge Dispo: home  Estimated Disch Date / # of Days until Disch: possible tomorrow after OR vs the next day pending intra-op findings. KEEP as OBSERVATION FOR NOW.         Interval History (Subjective):      Feeling ok, pain controlled. Did tolerate low fat diet this am.   Awaiting surgery tomorrow                   Physical Exam:      Last Vital Signs:  /78 (BP Location: Left arm)   Pulse 91   Temp 97.4  F (36.3  C) (Oral)   Resp 16   Ht 1.575 m (5' 2\")   Wt 81.4 kg (179 lb 6.4 oz)   SpO2 98%   BMI 32.81 kg/m      Constitutional: Awake, alert, cooperative, no apparent distress   Respiratory: Clear to auscultation bilaterally, no crackles or wheezing   Cardiovascular: Regular rate and rhythm, normal S1 and S2, and no murmur noted   Abdomen: Normal bowel sounds, soft, mild " TTP over the RUQ   Skin: No rashes, no cyanosis, dry to touch   Neuro: Alert and oriented x3, no weakness, numbness, memory loss   Extremities: No edema, normal range of motion   Other(s):        All other systems: Negative          Medications:      All current medications were reviewed with changes reflected in problem list.         Data:      All new lab and imaging data was reviewed.   Labs:       Lab Results   Component Value Date     04/23/2020     04/23/2020     04/22/2020    Lab Results   Component Value Date    CHLORIDE 106 04/23/2020    CHLORIDE 104 04/23/2020    CHLORIDE 103 04/22/2020    Lab Results   Component Value Date    BUN 13 04/23/2020    BUN 13 04/23/2020    BUN 14 04/22/2020      Lab Results   Component Value Date    POTASSIUM 4.4 04/23/2020    POTASSIUM 3.9 04/23/2020    POTASSIUM 6.1 04/22/2020    Lab Results   Component Value Date    CO2 32 04/23/2020    CO2 30 04/23/2020    CO2 27 04/22/2020    Lab Results   Component Value Date    CR 0.76 04/23/2020    CR 0.64 04/23/2020    CR 0.59 04/22/2020        Recent Labs   Lab 04/22/20  2305   WBC 9.6   HGB 14.3   HCT 43.5   MCV 91        Recent Labs   Lab 04/23/20  0549 04/23/20  0014 04/22/20  2305   * 439* 419*   * 659* 646*   ALKPHOS 321* 307* 301*   BILITOTAL 0.9 2.2* 1.9*     Recent Labs   Lab 04/22/20  2305   LIPASE 37*      Imaging:   Results for orders placed or performed during the hospital encounter of 04/22/20   US Abdomen Limited    Narrative    EXAM: US ABDOMEN LIMITED  LOCATION: [facility]  DATE/TIME: 4/22/2020 11:51 PM    INDICATION: Right upper quadrant abdominal pain.  COMPARISON: None.  TECHNIQUE: Limited abdominal ultrasound.    FINDINGS:    GALLBLADDER: Cholelithiasis with multiple stones and sludge material throughout the gallbladder lumen with moderate gallbladder distention. Abnormal gallbladder wall thickening at 7 mm. No definitive evidence for pericholecystic  fluid.    BILE DUCTS: No biliary dilatation. The common duct measures 5 mm.    LIVER: Normal parenchyma with smooth contour. No focal mass.    RIGHT KIDNEY: Right kidney measures 10.4 cm in length. No hydronephrosis.    PANCREAS: Visualized aspects of the pancreatic head unremarkable. Pancreatic body and tail obscured by bowel gas.    Proximal aorta and IVC normal in caliber. No ascites.      Impression    IMPRESSION:  1.  Cholelithiasis with multiple small stones and sludge material throughout the gallbladder lumen with gallbladder distention and gallbladder wall thickening at 7 mm. Findings are suspicious for acute cholecystitis. No pericholecystic fluid.    2.  No biliary dilatation.    3.  No hydronephrosis.   MR Abdomen MRCP w/o & w Contrast    Narrative    EXAM: MR ABDOMEN MRCP W/O AND W CONTRAST  LOCATION: [facility]  DATE/TIME: 4/23/2020 2:03 AM    INDICATION: Abdominal pain. Elevated liver function tests and bilirubin.  COMPARISON: None.  TECHNIQUE: Routine MR liver/pancreas protocol including axial and coronal MRCP sequences. 2D and 3D reconstruction performed by MR technologist including MIP reconstruction and slab cholangiograms. If performed with contrast, additional dynamic T1 post   IV contrast images.   CONTRAST: 8 mL Gadavist.     FINDINGS:     MRCP: A 0.5 cm signal void is present in the distal common bile duct, consistent with choledocholithiasis. No significant intra- or extrahepatic biliary dilatation. The common duct is at the upper limits of normal in caliber, measuring 0.6 cm in   diameter. Several small gallstones are present within the gallbladder. A few small T2 signal foci in the gallbladder wall at the fundus likely represent adenomyomatosis.    LIVER: A 2 cm high T2 signal enhancing focus in the left lobe of the liver likely represents a hemangioma. The liver is otherwise unremarkable.    PANCREAS: Unremarkable.    ADDITIONAL FINDINGS: None.      Impression     IMPRESSION:  1.  0.5 cm calculus in the distal common bile duct. There is no evidence of significant biliary obstruction due to calculus. The common duct is at the upper limits of normal in caliber, measuring 0.6 cm in diameter. No intrahepatic biliary dilatation.  2.  Cholelithiasis.

## 2020-04-23 NOTE — ED PROVIDER NOTES
History     Chief Complaint:  Abdominal Pain    HPI   Kacy Herrera is a 50 year old female who presents to the emergency department for evaluation of abdominal pain. Patient complains of epigastric pain that radiates to her back that she describes as a burning sensation for the past several days. She states her pain worsened 2 days ago, improved yesterday, and worsened again tonight. She states that the pain worsened after eating dinner tonight and currently feels gas bubbles in her abdomen. She has tried taking Tums, Pepto-bismol, and Prilosec to no relief. Patient does report a family history of gallstones. She does endorse drinking a few glasses of wine a week but denies excessive alcohol consumption.    Allergies:  Azithromycin    Medications:    Venlafaxine  Concerta    Past Medical History:    Past Medical History:   Diagnosis Date     Depressive disorder      Hypertension      Past Surgical History:    Past Surgical History:   Procedure Laterality Date      SECTION       FOOT SURGERY       JOINT REPLACEMENT (aka KNEE) NOS Left      Family History:    Gallstones - mother, sister    Social History:  Smoking status: everyday smoker  Alcohol use: yes  Drug use: no  The patient presents to the emergency department by herself.  [facility]P: [facility]  Marital Status:       Review of Systems   All other systems reviewed and are negative.    Physical Exam     Patient Vitals for the past 24 hrs:   BP Temp Temp src Pulse Resp SpO2   20 0055 -- -- -- -- -- 100 %   20 2345 146/79 -- -- 91 -- 99 %   20 2331 137/90 -- -- 84 -- 98 %   20 2300 135/90 -- -- 90 -- 97 %   20 2255 150/95 98.2  F (36.8  C) Oral 90 18 97 %     Physical Exam  Nursing note and vitals reviewed.  Constitutional: Cooperative.   HENT:   Mouth/Throat: Mucous membranes are normal.   Cardiovascular: Normal rate, regular rhythm and normal heart sounds.  No murmur.  Pulmonary/Chest: Effort  "normal and breath sounds normal. No respiratory distress. No wheezes. No rales.   Abdominal: Soft. Normal appearance and bowel sounds are normal. No distension. Epigastric to RUQ tenderness. There is no rigidity and no guarding.   Neurological: Alert. Oriented x4  Skin: Skin is warm and dry.   Psychiatric: Normal mood and affect.       Emergency Department Course   Imaging:  Radiographic findings were communicated with the patient who voiced understanding of the findings.    US Abdomen, limited:  IMPRESSION:   1.  Cholelithiasis with multiple small stones and sludge material throughout the gallbladder lumen with gallbladder distention and gallbladder wall thickening at 7 mm. Findings are suspicious for acute cholecystitis. No pericholecystic fluid.   2.  No biliary dilatation.   3.  No hydronephrosis. as per radiology.     MCRP:  pending    Laboratory:  CBC: WBC: 9.6, HGB: 14.3, PLT: 272  CMP - \"hemolyzed\": Glucose 104, Potassium 6.1 (HH), Bilirubin total 1.9 (H), Alkaline phosphatase 301 (H),  (HH),  (H), o/w WNL (Creatinine: 0.59)    Repeat CMP:  CMP: Glucose 104, Potassium 3.9, Bilirubin total 2.2 (H), Alkaline phosphatase 307 (H),  (HH),  (H), o/w WNL (Creatinine: 0.64)    Lipase: 37 (L)    Interventions:  Rocephin 2 g IV  IV fluid bolus - 1 liter NS  Morphine 4mg IV  Zofran 4mg IV    Emergency Department Course:  Nursing notes and vitals reviewed. (6082) I performed an exam of the patient as documented above.     IV inserted. Medicine administered as documented above. Blood drawn. This was sent to the lab for further testing, results above.     The patient was sent for a ultrasound while in the emergency department, findings above.     0035  I rechecked the patient and discussed the results of her workup thus far.     107  I consulted with Dr. Nathan of the hospitalist services. He is in agreement to accept the patient for admission.    Findings and plan explained to the Patient who " consents to admission. Discussed the patient with Dr. Nathan, who will admit the patient to a inpatient med bed for further monitoring, evaluation, and treatment.    Impression & Plan    Medical Decision Making:  Kacy Herrera is a 50 year old female who presents with epigastric abdominal pain as described in the HPI. Workup consistent with choledocholithiasis with probably early cholecystitis. She has thickened gall bladder wall and reproducible tenderness with her white blood cell count at the upper limits of normal. She has elevated bilirubin and liver function test concerning for obstruction. She would benefit from MRCP with consultation with gastroenterology and consideration in the future for cholecystectomy by general surgery. Antibiotics initiated. She shows no signs of severe sepsis and will be admitted in stable condition. Pain is controlled.    Diagnosis:    ICD-10-CM   1. Choledocholithiasis with acute cholecystitis with obstruction  K80.43   2. Elevated LFTs  R94.5   3. Elevated bilirubin  R17       Disposition:  Admitted to inpatient med  Saleem Bashir  4/22/2020   [facility] EMERGENCY DEPARTMENT  Scribe Disclosure:  I, Saleem Bashir, am serving as a scribe at 11:16 PM on 4/22/2020 to document services personally performed by Sameer Barahona MD based on my observations and the provider's statements to me.        Sameer Barahona MD  04/23/20 0124

## 2020-04-23 NOTE — H&P
Admitted:     2020      PRIMARY CARE PHYSICIAN:   [facility]      CHIEF COMPLAINT:  Abdominal pain.      HISTORY OF PRESENT ILLNESS:  Kacy Herrera is a 50-year-old female with past medical history significant for hypertension, depression who presented to the emergency room with abdominal pain.  The patient stated she has been having on and off epigastric abdominal pain that radiated to the back for the last 2 days.  Sometimes it she felt a burning sensation which has been ongoing for some days, but since yesterday the pain got worse.  Last night she had dinner and then the pain actually worsened at the abdomen, epigastric right upper quadrant radiating to the back.  She took some Tums and Pepto-Bismol and Prilosec without any relief and concerned and came to the emergency room.  The patient stated she has family history of gallstone.  She also drinks a few glasses of wine a few times a week and she smokes daily.  The patient denied any diarrhea or constipation.  No nausea or vomiting.  She denied any fever or chills.      In the emergency room, she was evaluated, discussed with Dr. Barahona.  She had elevated liver enzymes and ultrasound also showed cholelithiasis with multiple small stones and patient got pain medication and a dose of antibiotic and being admitted to the hospital.  The patient also had MRI and the result is pending.      PAST MEDICAL HISTORY:   1.  Depression.   2.  Hypertension.      PAST SURGICAL HISTORY:   1.   section.   2.  Joint replacement and multiple surgeries on the left.      FAMILY HISTORY:  Reviewed.  Mother and sister with history of gallstones.      SOCIAL HISTORY:  She smokes about one pack per day.  She drinks alcohol a few times a week.  She does not use illicit drugs.  She mostly drinks wine.  She is .      REVIEW OF SYSTEMS:  Ten points reviewed, all are negative except those mentioned in history of present illness.      HOME MEDICATIONS:    Prior to  Admission Medications   Prescriptions Last Dose Informant Patient Reported? Taking?   venlafaxine (EFFEXOR-XR) 150 MG 24 hr capsule 4/22/2020 at Unknown time  Yes Yes   Sig: Take 150 mg by mouth daily       Facility-Administered Medications: None     ALLERGIES:  ZITHROMAX.      PHYSICAL EXAMINATION:   GENERAL:  The patient is awake, alert, oriented, pleasant, not in distress.   VITAL SIGNS:  Blood pressure 135/80, pulse rate 99, temperature 96.3, oxygen saturation 95%.   HEENT:  Pink, nonicteric.  Extraocular muscle movement intact.   NECK:  Supple, no JVD, no thyromegaly.   CHEST:  Good air entry bilaterally.  No wheezing, crackles or rales.   CARDIOVASCULAR:  S1 and S2 were heard, no gallop or murmur.   ABDOMEN:  Soft.  Positive bowel sounds.  No organomegaly and distended, right upper quadrant tenderness noted.  No guarding or rigidity.   EXTREMITIES:  No edema, cyanosis or clubbing.   NEUROLOGIC:  No focal neurologic deficit.  Cranial nerves grossly intact.   PSYCHIATRIC:  Normal mood and affect, keeps eye contact, responds to question appropriately.      DIAGNOSTIC TESTS OF INTEREST:  Ultrasound showed cholelithiasis with multiple small stones and sludge material throughout the gallbladder lumen, gallbladder distention, thickening of 7.7 mm, suspicious for acute cholecystitis.  No pericholecystic fluid, no biliary dilatation.  MRCP pending.      LABORATORY DATA:  Sodium 138, potassium 3.9, BUN is 13, creatinine 0.6, calcium 9.1, bilirubin 2.2, alkaline phosphatase 77, , , lipase 38.  Glucose 104.  WBC 9.6, hemoglobin 14.3, platelet 272.  MRI actually reported as 0.5 cm calculus in the distal common bile duct.  There is no evidence of significant biliary obstruction due to calculus.      ASSESSMENT:  Kacy Herrera is a pleasant 50-year-old female with history of depression and hypertension who actually presented with abdominal pain which has been ongoing for several days, got worse in the last 2  days and even worse last night after she ate her dinner, came in and found to have cholelithiasis with choledocholithiasis and being admitted to the hospital under observation.   1.  Cholelithiasis with acute cholecystitis.   2.  Choledocholithiasis.      PLAN:  The patient is being admitted under observation.  MRI came back positive for choledocholithiasis 0.5 cm stone in the common bile duct reported continue to keep the patient n.p.o., pain control, GI and Surgery consulted.  The patient likely needs ERCP and cholecystectomy prior to discharge.  She got a dose of Rocephin in the emergency room, which will be continued every 24 hours until the gallbladder is out.  Discussed with the patient at length the plan of care.  All her questions and concerns addressed, showed understanding.  The patient is full code.  I also discussed with the ED physician, Dr. Barahona regarding this patient.         ANTON GAMBOA MD             D: 2020   T: 2020   MT: NUBIA      Name:     YAMILETH ORTIZ   MRN:      [id number]        Account:      [id number]   :      1970        Admitted:     2020                   Document: [id number]       cc: [facility]

## 2020-04-24 ENCOUNTER — SURGERY (OUTPATIENT)
Age: 50
End: 2020-04-24
Payer: COMMERCIAL

## 2020-04-24 ENCOUNTER — APPOINTMENT (OUTPATIENT)
Dept: GENERAL RADIOLOGY | Facility: CLINIC | Age: 50
End: 2020-04-24
Attending: SURGERY
Payer: COMMERCIAL

## 2020-04-24 ENCOUNTER — ANESTHESIA (OUTPATIENT)
Dept: SURGERY | Facility: CLINIC | Age: 50
End: 2020-04-24
Payer: COMMERCIAL

## 2020-04-24 VITALS
WEIGHT: 179.4 LBS | HEART RATE: 79 BPM | TEMPERATURE: 97.3 F | RESPIRATION RATE: 14 BRPM | OXYGEN SATURATION: 95 % | SYSTOLIC BLOOD PRESSURE: 115 MMHG | HEIGHT: 62 IN | BODY MASS INDEX: 33.01 KG/M2 | DIASTOLIC BLOOD PRESSURE: 72 MMHG

## 2020-04-24 LAB
ALBUMIN SERPL-MCNC: 3.2 G/DL (ref 3.4–5)
ALP SERPL-CCNC: 297 U/L (ref 40–150)
ALT SERPL W P-5'-P-CCNC: 419 U/L (ref 0–50)
ANION GAP SERPL CALCULATED.3IONS-SCNC: 3 MMOL/L (ref 3–14)
AST SERPL W P-5'-P-CCNC: 102 U/L (ref 0–45)
BILIRUB SERPL-MCNC: 0.6 MG/DL (ref 0.2–1.3)
BUN SERPL-MCNC: 11 MG/DL (ref 7–30)
CALCIUM SERPL-MCNC: 8.2 MG/DL (ref 8.5–10.1)
CHLORIDE SERPL-SCNC: 106 MMOL/L (ref 94–109)
CO2 SERPL-SCNC: 29 MMOL/L (ref 20–32)
CREAT SERPL-MCNC: 0.62 MG/DL (ref 0.52–1.04)
GFR SERPL CREATININE-BSD FRML MDRD: >90 ML/MIN/{1.73_M2}
GLUCOSE SERPL-MCNC: 100 MG/DL (ref 70–99)
HCG UR QL: NEGATIVE
POTASSIUM SERPL-SCNC: 4 MMOL/L (ref 3.4–5.3)
PROT SERPL-MCNC: 6.7 G/DL (ref 6.8–8.8)
SODIUM SERPL-SCNC: 138 MMOL/L (ref 133–144)

## 2020-04-24 PROCEDURE — 25000125 ZZHC RX 250: Performed by: NURSE ANESTHETIST, CERTIFIED REGISTERED

## 2020-04-24 PROCEDURE — 25800030 ZZH RX IP 258 OP 636: Performed by: ANESTHESIOLOGY

## 2020-04-24 PROCEDURE — 25800025 ZZH RX 258: Performed by: SURGERY

## 2020-04-24 PROCEDURE — 25000132 ZZH RX MED GY IP 250 OP 250 PS 637: Performed by: INTERNAL MEDICINE

## 2020-04-24 PROCEDURE — 25000128 H RX IP 250 OP 636: Performed by: ANESTHESIOLOGY

## 2020-04-24 PROCEDURE — 71000012 ZZH RECOVERY PHASE 1 LEVEL 1 FIRST HR: Performed by: SURGERY

## 2020-04-24 PROCEDURE — 27210794 ZZH OR GENERAL SUPPLY STERILE: Performed by: SURGERY

## 2020-04-24 PROCEDURE — 47563 LAPARO CHOLECYSTECTOMY/GRAPH: CPT | Performed by: SURGERY

## 2020-04-24 PROCEDURE — 47563 LAPARO CHOLECYSTECTOMY/GRAPH: CPT | Mod: AS | Performed by: PHYSICIAN ASSISTANT

## 2020-04-24 PROCEDURE — 36000058 ZZH SURGERY LEVEL 3 EA 15 ADDTL MIN: Performed by: SURGERY

## 2020-04-24 PROCEDURE — 25000125 ZZHC RX 250: Performed by: SURGERY

## 2020-04-24 PROCEDURE — 37000008 ZZH ANESTHESIA TECHNICAL FEE, 1ST 30 MIN: Performed by: SURGERY

## 2020-04-24 PROCEDURE — 88304 TISSUE EXAM BY PATHOLOGIST: CPT | Performed by: INTERNAL MEDICINE

## 2020-04-24 PROCEDURE — 99217 ZZC OBSERVATION CARE DISCHARGE: CPT | Performed by: PHYSICIAN ASSISTANT

## 2020-04-24 PROCEDURE — 37000009 ZZH ANESTHESIA TECHNICAL FEE, EACH ADDTL 15 MIN: Performed by: SURGERY

## 2020-04-24 PROCEDURE — 25000128 H RX IP 250 OP 636: Performed by: NURSE ANESTHETIST, CERTIFIED REGISTERED

## 2020-04-24 PROCEDURE — 27210995 ZZH RX 272: Performed by: SURGERY

## 2020-04-24 PROCEDURE — 71000027 ZZH RECOVERY PHASE 2 EACH 15 MINS: Performed by: SURGERY

## 2020-04-24 PROCEDURE — 80053 COMPREHEN METABOLIC PANEL: CPT | Performed by: PHYSICIAN ASSISTANT

## 2020-04-24 PROCEDURE — 36415 COLL VENOUS BLD VENIPUNCTURE: CPT | Performed by: PHYSICIAN ASSISTANT

## 2020-04-24 PROCEDURE — 25000128 H RX IP 250 OP 636: Performed by: SURGERY

## 2020-04-24 PROCEDURE — 88304 TISSUE EXAM BY PATHOLOGIST: CPT | Mod: 26 | Performed by: INTERNAL MEDICINE

## 2020-04-24 PROCEDURE — 25000132 ZZH RX MED GY IP 250 OP 250 PS 637: Performed by: SURGERY

## 2020-04-24 PROCEDURE — 40000277 XR SURGERY CARM FLUORO LESS THAN 5 MIN W STILLS

## 2020-04-24 PROCEDURE — 36000060 ZZH SURGERY LEVEL 3 W FLUORO 1ST 30 MIN: Performed by: SURGERY

## 2020-04-24 PROCEDURE — G0378 HOSPITAL OBSERVATION PER HR: HCPCS

## 2020-04-24 PROCEDURE — 81025 URINE PREGNANCY TEST: CPT | Performed by: ANESTHESIOLOGY

## 2020-04-24 PROCEDURE — 25800030 ZZH RX IP 258 OP 636: Performed by: NURSE ANESTHETIST, CERTIFIED REGISTERED

## 2020-04-24 PROCEDURE — 40000306 ZZH STATISTIC PRE PROC ASSESS II: Performed by: SURGERY

## 2020-04-24 PROCEDURE — 71000013 ZZH RECOVERY PHASE 1 LEVEL 1 EA ADDTL HR: Performed by: SURGERY

## 2020-04-24 PROCEDURE — 25800030 ZZH RX IP 258 OP 636: Performed by: INTERNAL MEDICINE

## 2020-04-24 RX ORDER — IOPAMIDOL 612 MG/ML
INJECTION, SOLUTION INTRATHECAL PRN
Status: DISCONTINUED | OUTPATIENT
Start: 2020-04-24 | End: 2020-04-24 | Stop reason: HOSPADM

## 2020-04-24 RX ORDER — CEFAZOLIN SODIUM 1 G/3ML
1 INJECTION, POWDER, FOR SOLUTION INTRAMUSCULAR; INTRAVENOUS SEE ADMIN INSTRUCTIONS
Status: DISCONTINUED | OUTPATIENT
Start: 2020-04-24 | End: 2020-04-24 | Stop reason: HOSPADM

## 2020-04-24 RX ORDER — OXYCODONE HYDROCHLORIDE 5 MG/1
10 TABLET ORAL
Status: COMPLETED | OUTPATIENT
Start: 2020-04-24 | End: 2020-04-24

## 2020-04-24 RX ORDER — NALOXONE HYDROCHLORIDE 0.4 MG/ML
.1-.4 INJECTION, SOLUTION INTRAMUSCULAR; INTRAVENOUS; SUBCUTANEOUS
Status: DISCONTINUED | OUTPATIENT
Start: 2020-04-24 | End: 2020-04-24 | Stop reason: HOSPADM

## 2020-04-24 RX ORDER — IPRATROPIUM BROMIDE AND ALBUTEROL SULFATE 2.5; .5 MG/3ML; MG/3ML
3 SOLUTION RESPIRATORY (INHALATION) ONCE
Status: DISCONTINUED | OUTPATIENT
Start: 2020-04-24 | End: 2020-04-24 | Stop reason: CLARIF

## 2020-04-24 RX ORDER — ONDANSETRON 2 MG/ML
4 INJECTION INTRAMUSCULAR; INTRAVENOUS EVERY 30 MIN PRN
Status: DISCONTINUED | OUTPATIENT
Start: 2020-04-24 | End: 2020-04-24 | Stop reason: HOSPADM

## 2020-04-24 RX ORDER — SODIUM CHLORIDE 9 MG/ML
INJECTION, SOLUTION INTRAVENOUS CONTINUOUS
Status: DISCONTINUED | OUTPATIENT
Start: 2020-04-24 | End: 2020-04-24 | Stop reason: HOSPADM

## 2020-04-24 RX ORDER — CEFAZOLIN SODIUM 2 G/100ML
2 INJECTION, SOLUTION INTRAVENOUS
Status: DISCONTINUED | OUTPATIENT
Start: 2020-04-24 | End: 2020-04-24 | Stop reason: HOSPADM

## 2020-04-24 RX ORDER — ONDANSETRON 4 MG/1
4 TABLET, ORALLY DISINTEGRATING ORAL EVERY 30 MIN PRN
Status: DISCONTINUED | OUTPATIENT
Start: 2020-04-24 | End: 2020-04-24 | Stop reason: HOSPADM

## 2020-04-24 RX ORDER — NEOSTIGMINE METHYLSULFATE 1 MG/ML
VIAL (ML) INJECTION PRN
Status: DISCONTINUED | OUTPATIENT
Start: 2020-04-24 | End: 2020-04-24

## 2020-04-24 RX ORDER — SODIUM CHLORIDE, SODIUM LACTATE, POTASSIUM CHLORIDE, CALCIUM CHLORIDE 600; 310; 30; 20 MG/100ML; MG/100ML; MG/100ML; MG/100ML
INJECTION, SOLUTION INTRAVENOUS CONTINUOUS
Status: DISCONTINUED | OUTPATIENT
Start: 2020-04-24 | End: 2020-04-24 | Stop reason: HOSPADM

## 2020-04-24 RX ORDER — AMOXICILLIN 250 MG
1-2 CAPSULE ORAL 2 TIMES DAILY
Qty: 30 TABLET | Refills: 0 | Status: SHIPPED | OUTPATIENT
Start: 2020-04-24 | End: 2021-12-28

## 2020-04-24 RX ORDER — PROPOFOL 10 MG/ML
INJECTION, EMULSION INTRAVENOUS PRN
Status: DISCONTINUED | OUTPATIENT
Start: 2020-04-24 | End: 2020-04-24

## 2020-04-24 RX ORDER — ALBUTEROL SULFATE 0.83 MG/ML
2.5 SOLUTION RESPIRATORY (INHALATION) EVERY 4 HOURS PRN
Status: DISCONTINUED | OUTPATIENT
Start: 2020-04-24 | End: 2020-04-24 | Stop reason: HOSPADM

## 2020-04-24 RX ORDER — IBUPROFEN 600 MG/1
600 TABLET, FILM COATED ORAL EVERY 6 HOURS PRN
Qty: 50 TABLET | Refills: 0 | Status: SHIPPED | OUTPATIENT
Start: 2020-04-24 | End: 2021-12-28

## 2020-04-24 RX ORDER — LIDOCAINE HYDROCHLORIDE 10 MG/ML
INJECTION, SOLUTION INFILTRATION; PERINEURAL PRN
Status: DISCONTINUED | OUTPATIENT
Start: 2020-04-24 | End: 2020-04-24

## 2020-04-24 RX ORDER — DEXAMETHASONE SODIUM PHOSPHATE 4 MG/ML
INJECTION, SOLUTION INTRA-ARTICULAR; INTRALESIONAL; INTRAMUSCULAR; INTRAVENOUS; SOFT TISSUE PRN
Status: DISCONTINUED | OUTPATIENT
Start: 2020-04-24 | End: 2020-04-24

## 2020-04-24 RX ORDER — FENTANYL CITRATE 50 UG/ML
25-50 INJECTION, SOLUTION INTRAMUSCULAR; INTRAVENOUS EVERY 5 MIN PRN
Status: DISCONTINUED | OUTPATIENT
Start: 2020-04-24 | End: 2020-04-24 | Stop reason: HOSPADM

## 2020-04-24 RX ORDER — ACETAMINOPHEN 325 MG/1
650 TABLET ORAL
Status: DISCONTINUED | OUTPATIENT
Start: 2020-04-24 | End: 2020-04-24 | Stop reason: HOSPADM

## 2020-04-24 RX ORDER — LIDOCAINE 40 MG/G
CREAM TOPICAL
Status: DISCONTINUED | OUTPATIENT
Start: 2020-04-24 | End: 2020-04-24 | Stop reason: HOSPADM

## 2020-04-24 RX ORDER — IBUPROFEN 600 MG/1
600 TABLET, FILM COATED ORAL ONCE
Status: COMPLETED | OUTPATIENT
Start: 2020-04-24 | End: 2020-04-24

## 2020-04-24 RX ORDER — MEPERIDINE HYDROCHLORIDE 25 MG/ML
12.5 INJECTION INTRAMUSCULAR; INTRAVENOUS; SUBCUTANEOUS
Status: DISCONTINUED | OUTPATIENT
Start: 2020-04-24 | End: 2020-04-24 | Stop reason: HOSPADM

## 2020-04-24 RX ORDER — ACETAMINOPHEN 325 MG/1
1000 TABLET ORAL EVERY 6 HOURS PRN
Qty: 100 TABLET | Refills: 0 | Status: SHIPPED | OUTPATIENT
Start: 2020-04-24

## 2020-04-24 RX ORDER — HYDROMORPHONE HYDROCHLORIDE 1 MG/ML
.3-.5 INJECTION, SOLUTION INTRAMUSCULAR; INTRAVENOUS; SUBCUTANEOUS EVERY 10 MIN PRN
Status: DISCONTINUED | OUTPATIENT
Start: 2020-04-24 | End: 2020-04-24 | Stop reason: HOSPADM

## 2020-04-24 RX ORDER — CEFAZOLIN SODIUM 2 G/100ML
INJECTION, SOLUTION INTRAVENOUS PRN
Status: DISCONTINUED | OUTPATIENT
Start: 2020-04-24 | End: 2020-04-24

## 2020-04-24 RX ORDER — OXYCODONE HYDROCHLORIDE 5 MG/1
5 TABLET ORAL ONCE
Status: COMPLETED | OUTPATIENT
Start: 2020-04-24 | End: 2020-04-24

## 2020-04-24 RX ORDER — GLYCOPYRROLATE 0.2 MG/ML
INJECTION, SOLUTION INTRAMUSCULAR; INTRAVENOUS PRN
Status: DISCONTINUED | OUTPATIENT
Start: 2020-04-24 | End: 2020-04-24

## 2020-04-24 RX ORDER — FENTANYL CITRATE 50 UG/ML
25-50 INJECTION, SOLUTION INTRAMUSCULAR; INTRAVENOUS
Status: DISCONTINUED | OUTPATIENT
Start: 2020-04-24 | End: 2020-04-24 | Stop reason: HOSPADM

## 2020-04-24 RX ORDER — OXYCODONE HYDROCHLORIDE 5 MG/1
5-10 TABLET ORAL EVERY 4 HOURS PRN
Qty: 12 TABLET | Refills: 0 | Status: SHIPPED | OUTPATIENT
Start: 2020-04-24 | End: 2021-12-28

## 2020-04-24 RX ORDER — EPHEDRINE SULFATE 50 MG/ML
INJECTION, SOLUTION INTRAMUSCULAR; INTRAVENOUS; SUBCUTANEOUS PRN
Status: DISCONTINUED | OUTPATIENT
Start: 2020-04-24 | End: 2020-04-24

## 2020-04-24 RX ADMIN — Medication 5 MG: at 10:16

## 2020-04-24 RX ADMIN — LIDOCAINE HYDROCHLORIDE 50 MG: 10 INJECTION, SOLUTION INFILTRATION; PERINEURAL at 09:41

## 2020-04-24 RX ADMIN — Medication 4 MG: at 11:05

## 2020-04-24 RX ADMIN — Medication 100 MG: at 09:41

## 2020-04-24 RX ADMIN — PHENYLEPHRINE HYDROCHLORIDE 100 MCG: 10 INJECTION INTRAVENOUS at 10:39

## 2020-04-24 RX ADMIN — GLYCOPYRROLATE 0.6 MG: 0.2 INJECTION, SOLUTION INTRAMUSCULAR; INTRAVENOUS at 11:05

## 2020-04-24 RX ADMIN — OXYCODONE HYDROCHLORIDE 5 MG: 5 TABLET ORAL at 12:12

## 2020-04-24 RX ADMIN — SODIUM CHLORIDE, POTASSIUM CHLORIDE, SODIUM LACTATE AND CALCIUM CHLORIDE: 600; 310; 30; 20 INJECTION, SOLUTION INTRAVENOUS at 11:34

## 2020-04-24 RX ADMIN — OXYCODONE HYDROCHLORIDE 5 MG: 5 TABLET ORAL at 00:05

## 2020-04-24 RX ADMIN — ROCURONIUM BROMIDE 10 MG: 10 INJECTION INTRAVENOUS at 10:29

## 2020-04-24 RX ADMIN — MIDAZOLAM 2 MG: 1 INJECTION INTRAMUSCULAR; INTRAVENOUS at 09:34

## 2020-04-24 RX ADMIN — HYDROMORPHONE HYDROCHLORIDE 0.5 MG: 1 INJECTION, SOLUTION INTRAMUSCULAR; INTRAVENOUS; SUBCUTANEOUS at 11:51

## 2020-04-24 RX ADMIN — FENTANYL CITRATE 50 MCG: 50 INJECTION, SOLUTION INTRAMUSCULAR; INTRAVENOUS at 12:02

## 2020-04-24 RX ADMIN — ROCURONIUM BROMIDE 10 MG: 10 INJECTION INTRAVENOUS at 10:48

## 2020-04-24 RX ADMIN — IBUPROFEN 600 MG: 600 TABLET, FILM COATED ORAL at 12:41

## 2020-04-24 RX ADMIN — SENNOSIDES AND DOCUSATE SODIUM 2 TABLET: 8.6; 5 TABLET ORAL at 07:35

## 2020-04-24 RX ADMIN — ROCURONIUM BROMIDE 20 MG: 10 INJECTION INTRAVENOUS at 10:02

## 2020-04-24 RX ADMIN — SODIUM CHLORIDE, POTASSIUM CHLORIDE, SODIUM LACTATE AND CALCIUM CHLORIDE: 600; 310; 30; 20 INJECTION, SOLUTION INTRAVENOUS at 11:36

## 2020-04-24 RX ADMIN — FENTANYL CITRATE 100 MCG: 50 INJECTION, SOLUTION INTRAMUSCULAR; INTRAVENOUS at 09:41

## 2020-04-24 RX ADMIN — FENTANYL CITRATE 50 MCG: 50 INJECTION, SOLUTION INTRAMUSCULAR; INTRAVENOUS at 11:46

## 2020-04-24 RX ADMIN — CEFAZOLIN SODIUM 2 G: 2 INJECTION, SOLUTION INTRAVENOUS at 09:43

## 2020-04-24 RX ADMIN — Medication 10 MG: at 10:19

## 2020-04-24 RX ADMIN — NICOTINE 1 PATCH: 21 PATCH, EXTENDED RELEASE TRANSDERMAL at 07:38

## 2020-04-24 RX ADMIN — SODIUM CHLORIDE, POTASSIUM CHLORIDE, SODIUM LACTATE AND CALCIUM CHLORIDE: 600; 310; 30; 20 INJECTION, SOLUTION INTRAVENOUS at 09:34

## 2020-04-24 RX ADMIN — FENTANYL CITRATE 50 MCG: 50 INJECTION, SOLUTION INTRAMUSCULAR; INTRAVENOUS at 11:29

## 2020-04-24 RX ADMIN — PROPOFOL 140 MG: 10 INJECTION, EMULSION INTRAVENOUS at 09:41

## 2020-04-24 RX ADMIN — PHENYLEPHRINE HYDROCHLORIDE 100 MCG: 10 INJECTION INTRAVENOUS at 10:33

## 2020-04-24 RX ADMIN — ONDANSETRON 4 MG: 2 INJECTION INTRAMUSCULAR; INTRAVENOUS at 10:21

## 2020-04-24 RX ADMIN — DEXAMETHASONE SODIUM PHOSPHATE 4 MG: 4 INJECTION, SOLUTION INTRA-ARTICULAR; INTRALESIONAL; INTRAMUSCULAR; INTRAVENOUS; SOFT TISSUE at 09:41

## 2020-04-24 RX ADMIN — OXYCODONE HYDROCHLORIDE 5 MG: 5 TABLET ORAL at 12:41

## 2020-04-24 RX ADMIN — SODIUM CHLORIDE: 9 INJECTION, SOLUTION INTRAVENOUS at 02:16

## 2020-04-24 RX ADMIN — PHENYLEPHRINE HYDROCHLORIDE 100 MCG: 10 INJECTION INTRAVENOUS at 10:21

## 2020-04-24 RX ADMIN — PHENYLEPHRINE HYDROCHLORIDE 100 MCG: 10 INJECTION INTRAVENOUS at 10:52

## 2020-04-24 RX ADMIN — FENTANYL CITRATE 50 MCG: 50 INJECTION, SOLUTION INTRAMUSCULAR; INTRAVENOUS at 11:10

## 2020-04-24 SDOH — HEALTH STABILITY: MENTAL HEALTH: CURRENT SMOKER: 1

## 2020-04-24 ASSESSMENT — ENCOUNTER SYMPTOMS: SEIZURES: 0

## 2020-04-24 ASSESSMENT — LIFESTYLE VARIABLES: TOBACCO_USE: 1

## 2020-04-24 NOTE — PROGRESS NOTES
GI progress note.     Cholelithiasis with choledocholithiasis : 5 mm CBD stone on MRCP, LFTs improving now, symptoms have resolved. ? Passed stone.    -- Discussed with Dr. Purcell, they will proceed with lap sebastián with IOC today   -- Patient was consented for EUS and ERCP if needed depending on IOC.     S:Denies pain.    Physical Exam:  GEN:NAD  HEENT: Scelera anicteric  CVS: RRR  Lungs: non-labored respiration  Abd: ND   Ext: No deformity.         Margie Severino MD

## 2020-04-24 NOTE — PLAN OF CARE
PRIMARY DIAGNOSIS: BILIARY COLIC/UNCOMPLICATED EARLY ACUTE CHOLECYSTITIS  OUTPATIENT/OBSERVATION GOALS TO BE MET BEFORE DISCHARGE:    1. Pain status: Improved-controlled with oral pain medications.  2. Stable vital signs and labs (if performed) at disposition: Yes  3. Tolerating adequate PO diet: Currently NPO  4. Successful cholecystectomy or clear follow up plan with General Surgery team if immediate surgery not performed Yes, 9 am OR time for Lap. Manuela/ERCP  5. ADLs back to baseline?  Yes  6. Activity and level of assistance: Ambulating independently.  7. Barriers to discharge noted. Yes, surgery in the morning    Discharge Planner Nurse   Safe discharge environment identified: Yes  Barriers to discharge: Yes       Entered by: Fransisco Hector 04/24/2020 3:02 AM    Vitals are Temp: 96.7  F (35.9  C) Temp src: Oral BP: (!) 144/94 Pulse: 85 Heart Rate: 82 Resp: 16 SpO2: 98 %.  AxOx4. Up independently in room. NPO at midnight. 4/10 burning pain to epigastric region, radiating to back. PRN oxycodone given. PIV SL. On IV rocephin. Denies dizziness, SOB, and nausea. CMS intact. Stable and resting in bed. Will continue to monitor and provide supportive cares.        Please review provider order for any additional goals.   Nurse to notify provider when observation goals have been met and patient is ready for discharge.

## 2020-04-24 NOTE — OP NOTE
General Surgery Operative Note    PREOPERATIVE DIAGNOSIS:  Choledocholithiasis [K80.50]  Common bile duct stone [K80.50]    POSTOPERATIVE DIAGNOSIS:  Same    PROCEDURE:  Laparoscopic Cholecystectomy with Intraoperative Cholangiogram    ANESTHESIA:  General.    PREOPERATIVE MEDICATIONS:  Ancef IV.    SURGEON:  Shirley Purcell MD    ASSISTANT:  Bere Brink PA-C    ESTIMATED BLOOD LOSS:  300 ml    INDICATIONS:  Kacy Herrera is a 50 year old female who has been experiencing episodes of epigastric and RUQ abdominal pain for the past 2 days associated with nausea.  Abdominal imaging has revealed cholelithiasis. CBD was only 5 mm, but her LFTs were concerning for choledocholithiasis.   She now presents for laparoscopic cholecystectomy with intraoperative cholangiogram after having risks and benefits reviewed in detail.    PROCEDURE:   An supraumbilical incision was made and the abdomen was entered using a Chavez trocar technique.  Secondary trocars were placed under laparoscopic guidance.  We proceeded in the usual fashion first finding the gallbladder neck cystic duct junction.  The cystic duct was then identified and cleared of inflammatory adhesions. The cystic artery was similarly identified.  Once a critical window of safety was achieved, the Hutchison clamp was positioned across the infundibulum for cholangiogram.  Cholangiogram initially showed a small filling defect in the distal CBD, but this passed with additional flushing of contrast. At the conclusion of the cholangiogram, there was normal filling of the duodenum with no filling defects within the common bile duct.  The cholangiogram catheter was removed and the cystic duct was triply clipped and divided.  Cystic artery was also triply clipped and divided. The gallbladder was removed from the gallbladder bed using coag cautery.  While the gallbladder was being removed from the liver bed, a superficial vein on the liver surface bled and had to be  controlled with additional clips.  Hemostasis was ensured. Once the remaining attachments were divided, the gallbladder was extracted from the supraumbilical site in an EndoCatch bag.  The camera was repositioned and the right upper quadrant inspected.  The right upper quadrant was copiously irrigated with the saline solution.  Returns were clear.  Trocar sites were then infiltrated with 0.5% Marcaine plain. The supraumbilical fascial opening was closed with interrupted 0 Vicryl. The skin was closed using 4-0 subcuticular vicryl. Steri-Strips were placed on the incisions. The patient was transferred to recovery in good condition.        INTRAOPERATIVE FINDINGS: Small stone in distal CBD which passed with flushing the duct    Specimens:   ID Type Source Tests Collected by Time Destination   A : Gallbladder and Contents  Tissue Gallbladder and Contents SURGICAL PATHOLOGY EXAM Shirley Purcell MD 4/24/2020 10:56 AM        Shirley Purcell MD

## 2020-04-24 NOTE — DISCHARGE INSTRUCTIONS
GENERAL ANESTHESIA OR SEDATION ADULT DISCHARGE INSTRUCTIONS   SPECIAL PRECAUTIONS FOR 24 HOURS AFTER SURGERY    IT IS NOT UNUSUAL TO FEEL LIGHT-HEADED OR FAINT, UP TO 24 HOURS AFTER SURGERY OR WHILE TAKING PAIN MEDICATION.  IF YOU HAVE THESE SYMPTOMS; SIT FOR A FEW MINUTES BEFORE STANDING AND HAVE SOMEONE ASSIST YOU WHEN YOU GET UP TO WALK OR USE THE BATHROOM.    YOU SHOULD REST AND RELAX FOR THE NEXT 24 HOURS AND YOU MUST MAKE ARRANGEMENTS TO HAVE SOMEONE STAY WITH YOU FOR AT LEAST 24 HOURS AFTER YOUR DISCHARGE.  AVOID HAZARDOUS AND STRENUOUS ACTIVITIES.  DO NOT MAKE IMPORTANT DECISIONS FOR 24 HOURS.    DO NOT DRIVE ANY VEHICLE OR OPERATE MECHANICAL EQUIPMENT FOR 24 HOURS FOLLOWING THE END OF YOUR SURGERY.  EVEN THOUGH YOU MAY FEEL NORMAL, YOUR REACTIONS MAY BE AFFECTED BY THE MEDICATION YOU HAVE RECEIVED.    DO NOT DRINK ALCOHOLIC BEVERAGES FOR 24 HOURS FOLLOWING YOUR SURGERY.    DRINK CLEAR LIQUIDS (APPLE JUICE, GINGER ALE, 7-UP, BROTH, ETC.).  PROGRESS TO YOUR REGULAR DIET AS YOU FEEL ABLE.    YOU MAY HAVE A DRY MOUTH, A SORE THROAT, MUSCLES ACHES OR TROUBLE SLEEPING.  THESE SHOULD GO AWAY AFTER 24 HOURS.    CALL YOUR DOCTOR FOR ANY OF THE FOLLOWING:  SIGNS OF INFECTION (FEVER, GROWING TENDERNESS AT THE SURGERY SITE, A LARGE AMOUNT OF DRAINAGE OR BLEEDING, SEVERE PAIN, FOUL-SMELLING DRAINAGE, REDNESS OR SWELLING.    IT HAS BEEN OVER 8 TO 10 HOURS SINCE SURGERY AND YOU ARE STILL NOT ABLE TO URINATE (PASS WATER).

## 2020-04-24 NOTE — DISCHARGE SUMMARY
[facility]  Discharge Summary        Kacy Herrera MRN# [id number]   YOB: 1970 Age: 50 year old     Date of Admission:  4/22/2020  Date of Discharge:  4/24/2020  Admitting Physician:  Triston Nathan MD  Discharge Physician: Silke Escudero PA-C  Discharging Service: Hospitalist      Primary Provider: [facility]  Primary Care Physician Phone Number: None         Primary Discharge Diagnoses:    Kacy Herrera is a 50-year-old female with past medical history significant for hypertension, depression who presented to the emergency room with abdominal pain 2/2 cholelithiasis with acute cholecystitis and choledocholithiasis.      #Intractable biliary colic/acute cholecystitis  #S/p laparoscopic cholecystectomy   #Choledocholithiasis: stone was able to be flushed during laparoscopic cholecystectomy thus there was no need for ERCP by GI.     #HTN: BPs initially elevated, likely 2/2 to pain, not currently on medical management. Continue to monitor in outpatient setting.         Secondary Discharge Diagnoses:     Past Medical History:   Diagnosis Date     Cholelithiasis      Depressive disorder      Hypertension             Code Status:      Full Code        Brief Hospital Summary:        Reason for your hospital stay      You were admitted for concerns of stomach pain related to your   gallbladder. Imaging in the ER showed that your gallbladder was inflamed.   There was evidence of a stone in the biliary tract. You were treated with   antibiotics. Your pain and nausea was controlled with narcotics and anti   nausea medications. You were seen by surgery who opted to remove your   gallbladder. GI was also consulted due to concern for a stone in your   common bile duct with plans to remove this after your gallbladder is   removed. The stone was able to be flushed without need for an additional   procedure to remove. You were doing fine post operatively to  allowed to   discharge home with outpatient follow up.           Please refer to initial admission history and physical for further details.   Briefly, Kacy Herrera was admitted on 4/22/2020 with intractable biliary colic/acute cholecystitis. Initial work up in the ED did not reveal evidence of sepsis to require inpatient hospitalization. Pt was registered to the Observation Unit for pain control and supportive measures.     Pt was resuscitated with IVF, and anti-emetics. Laboratory and imaging results indicated: cholelithiasis with cholecystitis and evidence of choledocholithiasis on MRCP. General surgery was consulted. Patient underwent laparoscopic cholecystectomy (please see detailed operative report). Discussed with surgeon that CBD stone was able to flushed out during cholangiogram intraoperatively and there was no need for ERCP by GI. Post-op, pt did well. Pain control was transitioned from IV to PO form. At the time of discharge, pt's pain was controlled and was able to tolerate PO intake.  Medications were reviewed. Pt is instructed to follow up as below.             Significant Lab During Hospitalization:      Recent Labs   Lab 04/22/20  2305   WBC 9.6   HGB 14.3   HCT 43.5   MCV 91        Recent Labs   Lab 04/24/20  0811 04/23/20  0549 04/23/20  0014    139 138   POTASSIUM 4.0 4.4 3.9   CHLORIDE 106 106 104   CO2 29 32 30   ANIONGAP 3 1* 4   * 101* 104*   BUN 11 13 13   CR 0.62 0.76 0.64   GFRESTIMATED >90 >90 >90   GFRESTBLACK >90 >90 >90   ZENA 8.2* 8.7 9.1   PROTTOTAL 6.7* 7.0 7.0   ALBUMIN 3.2* 3.5 3.5   BILITOTAL 0.6 0.9 2.2*   ALKPHOS 297* 321* 307*   * 465* 439*   * 737* 659*              Significant Imaging During Hospitalization:      Results for orders placed or performed during the hospital encounter of 04/22/20   US Abdomen Limited    Narrative    EXAM: US ABDOMEN LIMITED  LOCATION: [facility]  DATE/TIME: 4/22/2020 11:51 PM    INDICATION:  Right upper quadrant abdominal pain.  COMPARISON: None.  TECHNIQUE: Limited abdominal ultrasound.    FINDINGS:    GALLBLADDER: Cholelithiasis with multiple stones and sludge material throughout the gallbladder lumen with moderate gallbladder distention. Abnormal gallbladder wall thickening at 7 mm. No definitive evidence for pericholecystic fluid.    BILE DUCTS: No biliary dilatation. The common duct measures 5 mm.    LIVER: Normal parenchyma with smooth contour. No focal mass.    RIGHT KIDNEY: Right kidney measures 10.4 cm in length. No hydronephrosis.    PANCREAS: Visualized aspects of the pancreatic head unremarkable. Pancreatic body and tail obscured by bowel gas.    Proximal aorta and IVC normal in caliber. No ascites.      Impression    IMPRESSION:  1.  Cholelithiasis with multiple small stones and sludge material throughout the gallbladder lumen with gallbladder distention and gallbladder wall thickening at 7 mm. Findings are suspicious for acute cholecystitis. No pericholecystic fluid.    2.  No biliary dilatation.    3.  No hydronephrosis.   MR Abdomen MRCP w/o & w Contrast    Narrative    EXAM: MR ABDOMEN MRCP W/O AND W CONTRAST  LOCATION: [facility]  DATE/TIME: 4/23/2020 2:03 AM    INDICATION: Abdominal pain. Elevated liver function tests and bilirubin.  COMPARISON: None.  TECHNIQUE: Routine MR liver/pancreas protocol including axial and coronal MRCP sequences. 2D and 3D reconstruction performed by MR technologist including MIP reconstruction and slab cholangiograms. If performed with contrast, additional dynamic T1 post   IV contrast images.   CONTRAST: 8 mL Gadavist.     FINDINGS:     MRCP: A 0.5 cm signal void is present in the distal common bile duct, consistent with choledocholithiasis. No significant intra- or extrahepatic biliary dilatation. The common duct is at the upper limits of normal in caliber, measuring 0.6 cm in   diameter. Several small gallstones are present within the  "gallbladder. A few small T2 signal foci in the gallbladder wall at the fundus likely represent adenomyomatosis.    LIVER: A 2 cm high T2 signal enhancing focus in the left lobe of the liver likely represents a hemangioma. The liver is otherwise unremarkable.    PANCREAS: Unremarkable.    ADDITIONAL FINDINGS: None.      Impression    IMPRESSION:  1.  0.5 cm calculus in the distal common bile duct. There is no evidence of significant biliary obstruction due to calculus. The common duct is at the upper limits of normal in caliber, measuring 0.6 cm in diameter. No intrahepatic biliary dilatation.  2.  Cholelithiasis.              Pending Results:      None        Consultations This Hospital Stay:      Consultation during this admission received from gastroenterology and surgery         Discharge Instructions and Follow-Up:      Follow-up Appointments     Follow-up and recommended labs and tests       Follow up with primary care provider, [facility], within   7 days to evaluate after surgery and for hospital follow- up.  No follow   up labs or test are needed.  Follow up with general surgery per their recommendations and recheck LFTs   to monitor for normalization.                 Discharge Disposition:      Discharged to home         Discharge Medications:        Current Discharge Medication List      CONTINUE these medications which have NOT CHANGED    Details   venlafaxine (EFFEXOR-XR) 150 MG 24 hr capsule Take 150 mg by mouth daily                Allergies:         Allergies   Allergen Reactions     Zithromax [Azithromycin] Hives           Condition and Physical on Discharge:      Discharge condition: Stable   Vitals: Blood pressure (!) 149/94, pulse 73, temperature 97.7  F (36.5  C), temperature source Temporal, resp. rate 15, height 1.575 m (5' 2\"), weight 81.4 kg (179 lb 6.4 oz), last menstrual period 04/10/2020, SpO2 99 %.  179 lbs 6.4 oz      GENERAL:  Comfortable.  PSYCH: pleasant, oriented, No " acute distress.  HEENT:  PERRLA. Normal conjunctiva, normal hearing, nasal mucosa and Oropharynx are normal.  NECK:  Supple, no neck vein distention, adenopathy or bruits, normal thyroid.  HEART:  Normal S1, S2 with no murmur, no pericardial rub, gallops or S3 or S4.  LUNGS:  Clear to auscultation, normal respiratory effort. No wheezing, rales or ronchi.  ABDOMEN:  Soft, no hepatosplenomegaly, normal bowel sounds. Mild RUQ tenderness to palpation prior to procedure  EXTREMITIES:  No pedal edema, +2 pulses bilateral and equal.  SKIN:  Dry to touch, No rash, wound or ulcerations.  NEUROLOGIC:  CN 2-12 intact, BL 5/5 symmetric upper and lower extremity strength, sensation is intact with no focal deficits.     Silke Escudero PA-C

## 2020-04-24 NOTE — PROGRESS NOTES
Discharge instructions reviewed via telephone with .   stated all questions were answered at this time.  AVS printed & given to patient to review instructions when ready.

## 2020-04-24 NOTE — PLAN OF CARE
PRIMARY DIAGNOSIS: BILIARY COLIC/UNCOMPLICATED EARLY ACUTE CHOLECYSTITIS  OUTPATIENT/OBSERVATION GOALS TO BE MET BEFORE DISCHARGE:    1. Pain status: Pain free.  2. Stable vital signs and labs (if performed) at disposition: Yes  3. Tolerating adequate PO diet: Yes  4. Successful cholecystectomy or clear follow up plan with General Surgery team if immediate surgery not performed Yes  5. ADLs back to baseline?  Yes  6. Activity and level of assistance: Ambulating independently.  7. Barriers to discharge noted Yes, ercp and lap sebastián tomorrow     Discharge Planner Nurse   Safe discharge environment identified: Yes  Barriers to discharge: Yes       Entered by: Liset Amezcua 04/23/2020 9:59 PM     Please review provider order for any additional goals.   Nurse to notify provider when observation goals have been met and patient is ready for discharge.    VSS, on RA, afebrile. Up Independently. A&O. Denies pain. Denies nausea. Nicotine patch in place on right arm. Saline locked. Low fat diet, npo at midnight for ercp and lap sebastián tomorrow.

## 2020-04-24 NOTE — ANESTHESIA POSTPROCEDURE EVALUATION
Patient: Kacy Herrera    Procedure(s):  CHOLECYSTECTOMY, LAPAROSCOPIC with cholangiograms    Diagnosis:Choledocholithiasis [K80.50]  Common bile duct stone [K80.50]  Diagnosis Additional Information: No value filed.    Anesthesia Type:  General    Note:  Anesthesia Post Evaluation    Patient location during evaluation: PACU  Patient participation: Able to fully participate in evaluation  Level of consciousness: awake and alert  Pain management: adequate  Airway patency: patent  Cardiovascular status: acceptable  Respiratory status: acceptable  Hydration status: acceptable  PONV: controlled             Last vitals:  Vitals:    04/24/20 0830 04/24/20 1136 04/24/20 1140   BP: (!) 149/94 139/88 129/78   Pulse:  86 81   Resp: 15  17   Temp: 97.7  F (36.5  C) 97  F (36.1  C)    SpO2: 99% 100% 100%         Electronically Signed By: Evelio Kruger MD  April 24, 2020  11:43 AM

## 2020-04-24 NOTE — PROGRESS NOTES
"PRIMARY DIAGNOSIS: BILIARY COLIC/UNCOMPLICATED EARLY ACUTE CHOLECYSTITIS  OUTPATIENT/OBSERVATION GOALS TO BE MET BEFORE DISCHARGE:     1. Pain status: Improved but still requiring Oxycodone  2. Stable vital signs and labs (if performed) at disposition: No  3. Tolerating adequate PO diet: NPO  4. Successful cholecystectomy or clear follow up plan with General Surgery team if immediate surgery not performed surgery at 0900  5. ADLs back to baseline?  Yes  6. Activity and level of assistance: Ambulating independently.  7. Barriers to discharge noted Yes surgery 0900.         Discharge Planner Nurse      Safe discharge environment identified: Yes  Barriers to discharge: Yes       Entered by: Dolores Patel 04/23/2020 0800     Please review provider order for any additional goals.   Nurse to notify provider when observation goals have been met and patient is ready for discharge.     Patient is alert and oriented x4. VS WNL and documented on the FS. Lung sounds clear in all lobes and patient is on RA. Denies SOB. Active bowel sounds in all 4 quadrants with LBM Wed (stool softeners given). Patient denies any pain, urgency, and frequency when voiding. Patient rating abdominal pain a 3/10 and didn't want anything for pain. Patient can also have IV Dilaudid. IV fluids infusing at 75 ml/hr.  NPO since midnight. Independent in room. Plan: Lap cholecystectomy and ERCP today.     BP (!) 149/94   Pulse 73   Temp 97.7  F (36.5  C) (Temporal)   Resp 15   Ht 1.575 m (5' 2\")   Wt 81.4 kg (179 lb 6.4 oz)   LMP 04/10/2020 (Approximate)   SpO2 99%   BMI 32.81 kg/m      "

## 2020-04-24 NOTE — ANESTHESIA CARE TRANSFER NOTE
Patient: Kacy Herrera    Procedure(s):  CHOLECYSTECTOMY, LAPAROSCOPIC with cholangiograms    Diagnosis: Choledocholithiasis [K80.50]  Common bile duct stone [K80.50]  Diagnosis Additional Information: No value filed.    Anesthesia Type:   General     Note:  Airway :Face Mask  Patient transferred to:PACU  Comments: VSS.  Spontaneously breathing O2 per simple face mask.  Report given to RN.Handoff Report: Identifed the Patient, Identified the Reponsible Provider, Reviewed the pertinent medical history, Discussed the surgical course, Reviewed Intra-OP anesthesia mangement and issues during anesthesia, Set expectations for post-procedure period and Allowed opportunity for questions and acknowledgement of understanding      Vitals: (Last set prior to Anesthesia Care Transfer)    CRNA VITALS  4/24/2020 1104 - 4/24/2020 1140      4/24/2020             Pulse:  84    SpO2:  100 %                Electronically Signed By: FINA Carey CRNA  April 24, 2020  11:40 AM

## 2020-04-24 NOTE — PLAN OF CARE
PRIMARY DIAGNOSIS: BILIARY COLIC/UNCOMPLICATED EARLY ACUTE CHOLECYSTITIS  OUTPATIENT/OBSERVATION GOALS TO BE MET BEFORE DISCHARGE:    1. Pain status: Improved-controlled with oral pain medications.  2. Stable vital signs and labs (if performed) at disposition: Yes  3. Tolerating adequate PO diet: Currently NPO  4. Successful cholecystectomy or clear follow up plan with General Surgery team if immediate surgery not performed Yes, 9 am OR time for Lap. Manuela/ERCP  5. ADLs back to baseline?  Yes  6. Activity and level of assistance: Ambulating independently.  7. Barriers to discharge noted. Yes, surgery in the morning    Discharge Planner Nurse   Safe discharge environment identified: Yes  Barriers to discharge: Yes       Entered by: Fransisco Hector 04/24/2020 4:25 AM    Vitals are Temp: 96.7  F (35.9  C) Temp src: Oral BP: (!) 144/94 Pulse: 85 Heart Rate: 82 Resp: 16 SpO2: 98 %.  AxOx4. Up independently in room. NPO at midnight. Abdominal pain controlled with PRN oxycodone. NS at 75 mL/hr. On IV rocephin. Denies dizziness, SOB, and nausea. CMS intact. Nicotine patch in place to r. Shoulder. Stable and resting in bed. Will continue to monitor and provide supportive cares.        Please review provider order for any additional goals.   Nurse to notify provider when observation goals have been met and patient is ready for discharge.

## 2020-04-24 NOTE — ANESTHESIA PREPROCEDURE EVALUATION
Anesthesia Pre-Procedure Evaluation    Patient: Kacy Herrera   MRN: [id number] : 1970          Preoperative Diagnosis: Choledocholithiasis [K80.50]  Common bile duct stone [K80.50]    Procedure(s):  CHOLECYSTECTOMY, LAPAROSCOPIC  ENDOSCOPIC RETROGRADE CHOLANGIOPANCREATOGRAPHY    Past Medical History:   Diagnosis Date     Cholelithiasis      Depressive disorder      Hypertension      Past Surgical History:   Procedure Laterality Date      SECTION       FOOT SURGERY       JOINT REPLACEMENT (aka KNEE) NOS Left      Anesthesia Evaluation     . Pt has had prior anesthetic. Type: General    No history of anesthetic complications          ROS/MED HX    ENT/Pulmonary:     (+)tobacco use, Current use 1 packs/day  , . .    Neurologic:  - neg neurologic ROS    (-) seizures and Neuropathy   Cardiovascular:     (+) hypertension----. : . . . :. .      (-) syncope and irregular heartbeat/palpitations   METS/Exercise Tolerance:     Hematologic:  - neg hematologic  ROS      (-) anemia   Musculoskeletal:  - neg musculoskeletal ROS       GI/Hepatic:     (+) cholecystitis/cholelithiasis,       Renal/Genitourinary:  - ROS Renal section negative       Endo:  - neg endo ROS       Psychiatric:     (+) psychiatric history depression      Infectious Disease:  - neg infectious disease ROS       Malignancy:         Other:                          Physical Exam  Normal systems: cardiovascular, pulmonary and dental    Airway   Mallampati: II  TM distance: >3 FB  Neck ROM: full    Dental     Cardiovascular       Pulmonary             Lab Results   Component Value Date    WBC 9.6 2020    HGB 14.3 2020    HCT 43.5 2020     2020     2020    POTASSIUM 4.0 2020    CHLORIDE 106 2020    CO2 29 2020    BUN 11 2020    CR 0.62 2020     (H) 2020    ZENA 8.2 (L) 2020    ALBUMIN 3.2 (L) 2020    PROTTOTAL 6.7 (L) 2020     (H)  "04/24/2020     (H) 04/24/2020    ALKPHOS 297 (H) 04/24/2020    BILITOTAL 0.6 04/24/2020    LIPASE 37 (L) 04/22/2020    PTT 26 01/14/2008    INR 0.95 01/21/2011    HCG Negative 01/21/2011       Preop Vitals  BP Readings from Last 3 Encounters:   04/24/20 (!) 149/94   01/20/16 (!) 152/92    Pulse Readings from Last 3 Encounters:   04/24/20 73      Resp Readings from Last 3 Encounters:   04/24/20 15   01/20/16 18    SpO2 Readings from Last 3 Encounters:   04/24/20 99%   01/20/16 97%      Temp Readings from Last 1 Encounters:   04/24/20 97.7  F (36.5  C) (Temporal)    Ht Readings from Last 1 Encounters:   04/23/20 1.575 m (5' 2\")      Wt Readings from Last 1 Encounters:   04/23/20 81.4 kg (179 lb 6.4 oz)    Estimated body mass index is 32.81 kg/m  as calculated from the following:    Height as of this encounter: 1.575 m (5' 2\").    Weight as of this encounter: 81.4 kg (179 lb 6.4 oz).       Anesthesia Plan      History & Physical Review  History and physical reviewed and following examination; no interval change.    ASA Status:  2 .    NPO Status:  > 8 hours    Plan for General with Intravenous induction. Maintenance will be Balanced.    PONV prophylaxis:  Ondansetron (or other 5HT-3) and Dexamethasone or Solumedrol  Additional equipment: Videolaryngoscope   The patient is a current Smoker and Patient was instructed to abstain from smoking on day of procedurepatient did not smoke on day of surgery     Postoperative Care  Postoperative pain management:  IV analgesics.      Consents  Anesthetic plan, risks, benefits and alternatives discussed with:  Patient..                 Evelio Kruger MD                    .  "

## 2020-04-27 LAB — COPATH REPORT: NORMAL

## 2020-05-11 ENCOUNTER — TELEPHONE (OUTPATIENT)
Dept: SURGERY | Facility: CLINIC | Age: 50
End: 2020-05-11

## 2020-05-11 DIAGNOSIS — Z98.890 POSTOPERATIVE STATE: Primary | ICD-10-CM

## 2020-05-11 NOTE — TELEPHONE ENCOUNTER
[facility]   Postoperative Follow-up Phone Call  -Call to patient to review recent procedure and recovery    Attempted to call patient for post op check.  No answer.  Detailed message was left for patient (ok'd per call instructions), and recommended patient to call back if she had any questions or concerns.     Bere Brink PA-C

## 2021-05-31 VITALS — WEIGHT: 176 LBS | BODY MASS INDEX: 32.39 KG/M2 | HEIGHT: 62 IN

## 2021-06-13 NOTE — ANESTHESIA CARE TRANSFER NOTE
Last vitals:   Vitals:    11/01/17 1022   BP: (P) 146/79   Pulse: (P) 89   Resp: (P) 20   Temp: (P) 36.8  C (98.2  F)   SpO2: (P) 100%     Patient's level of consciousness is awake  Spontaneous respirations: yes  Maintains airway independently: yes  Dentition unchanged: yes  Oropharynx: oropharynx clear of all foreign objects    QCDR Measures:  ASA# 20 - Surgical Safety Checklist: WHO surgical safety checklist completed prior to induction  PQRS# 430 - Adult PONV Prevention: 4558F - Pt received => 2 anti-emetic agents (different classes) preop & intraop  ASA# 8 - Peds PONV Prevention: NA - Not pediatric patient, not GA or 2 or more risk factors NOT present  PQRS# 424 - [place]-op Temp Management: 4559F - At least one body temp DOCUMENTED => 35.5C or 95.9F within required timeframe  PQRS# 426 - PACU Transfer Protocol: - Transfer of care checklist used  ASA# 14 - Acute Post-op Pain: ASA14B - Patient did NOT experience pain >= 7 out of 10

## 2021-06-13 NOTE — ANESTHESIA PROCEDURE NOTES
Peripheral Block    Patient location during procedure: pre-op  Start time: 11/1/2017 6:45 AM  End time: 11/1/2017 6:47 AM  post-op analgesia per surgeon order as noted in medical record  Staffing:  Performing  Anesthesiologist: ZULEIKA KLINE A  Preanesthetic Checklist  Completed: patient identified, site marked, risks, benefits, and alternatives discussed, timeout performed, consent obtained, at patient's request, airway assessed, oxygen available, suction available, emergency drugs available and hand hygiene performed  Peripheral Block  Block type: saphenous, adductor canal block  Prep: ChloraPrep  Patient position: supine  Patient monitoring: cardiac monitor, continuous pulse oximetry, heart rate and blood pressure  Laterality: left  Injection technique: ultrasound guided    Ultrasound used to visualize needle placement in proximity to nerve being blocked: yes   Permanent ultrasound image captured for medical record    Needle  Needle type: Stimuplex   Needle gauge: 20G  Needle length: 4 in  no peripheral nerve catheter placed  Assessment  Injection assessment: no difficulty with injection, negative aspiration for heme, no paresthesia on injection and incremental injection

## 2021-06-13 NOTE — ANESTHESIA POSTPROCEDURE EVALUATION
Patient: Kacy Herrera  CONVERT LEFT UNICOMPARTMENTAL , TO TOTAL KNEE ARTHROPLASTY  Anesthesia type: spinal    Patient location: PACU  Last vitals:   Vitals:    11/01/17 1100   BP: 128/73   Pulse: 79   Resp: 16   Temp: 36.8  C (98.3  F)   SpO2: 93%     Post vital signs: stable  Level of consciousness: awake and responds to simple questions  Post-anesthesia pain: pain controlled  Post-anesthesia nausea and vomiting: no  Pulmonary: unassisted, return to baseline  Cardiovascular: stable and blood pressure at baseline  Hydration: adequate  Anesthetic events: no    QCDR Measures:  ASA# 11 - [place]-op Cardiac Arrest: ASA11B - Patient did NOT experience unanticipated cardiac arrest  ASA# 12 - [place]-op Mortality Rate: ASA12B - Patient did NOT die  ASA# 13 - PACU Re-Intubation Rate: ASA13B - Patient did NOT require a new airway mgmt  ASA# 10 - Composite Anes Safety: ASA10A - No serious adverse event    Additional Notes:

## 2021-06-13 NOTE — ANESTHESIA PROCEDURE NOTES
Peripheral Block    Patient location during procedure: pre-op  Start time: 11/1/2017 6:48 AM  End time: 11/1/2017 6:50 AM  post-op analgesia per surgeon order as noted in medical record  Staffing:  Performing  Anesthesiologist: ZULEIKA KLINE A  Preanesthetic Checklist  Completed: patient identified, site marked, risks, benefits, and alternatives discussed, timeout performed, consent obtained, at patient's request, airway assessed, oxygen available, suction available, emergency drugs available and hand hygiene performed  Peripheral Block  Block type: other, tibial  Prep: ChloraPrep  Patient position: supine  Patient monitoring: cardiac monitor, continuous pulse oximetry, heart rate and blood pressure  Laterality: left  Injection technique: ultrasound guided    Ultrasound used to visualize needle placement in proximity to nerve being blocked: yes   Permanent ultrasound image captured for medical record    Needle  Needle type: Stimuplex   Needle gauge: 20G  Needle length: 4 in  no peripheral nerve catheter placed  Assessment  Injection assessment: no difficulty with injection, negative aspiration for heme, no paresthesia on injection and incremental injection

## 2021-06-13 NOTE — ANESTHESIA PROCEDURE NOTES
Spinal Block    Patient location during procedure: OR  Start time: 11/1/2017 7:24 AM  End time: 11/1/2017 7:29 AM  Reason for block: primary anesthetic    Staffing:  Performing  Anesthesiologist: ZULEIKA KLINE A    Preanesthetic Checklist  Completed: patient identified, risks, benefits, and alternatives discussed, timeout performed, consent obtained, at patient's request, airway assessed, oxygen available, suction available, emergency drugs available and hand hygiene performed  Spinal Block  Patient position: sitting  Prep: ChloraPrep  Patient monitoring: heart rate, cardiac monitor, continuous pulse ox and blood pressure  Approach: midline  Location: L3-4  Injection technique: single-shot  Needle type: pencil-tip   Needle gauge: 24 G    Assessment  Events: paresthesia    Additional Notes:  Brief right sided paresthesia first pass.

## 2021-08-03 ENCOUNTER — MEDICAL CORRESPONDENCE (OUTPATIENT)
Dept: HEALTH INFORMATION MANAGEMENT | Facility: CLINIC | Age: 51
End: 2021-08-03

## 2021-08-04 DIAGNOSIS — Z11.59 ENCOUNTER FOR SCREENING FOR OTHER VIRAL DISEASES: ICD-10-CM

## 2021-08-08 ENCOUNTER — LAB (OUTPATIENT)
Dept: URGENT CARE | Facility: URGENT CARE | Age: 51
End: 2021-08-08
Payer: COMMERCIAL

## 2021-08-08 DIAGNOSIS — Z11.59 ENCOUNTER FOR SCREENING FOR OTHER VIRAL DISEASES: ICD-10-CM

## 2021-08-08 LAB — SARS-COV-2 RNA RESP QL NAA+PROBE: NEGATIVE

## 2021-08-08 PROCEDURE — U0003 INFECTIOUS AGENT DETECTION BY NUCLEIC ACID (DNA OR RNA); SEVERE ACUTE RESPIRATORY SYNDROME CORONAVIRUS 2 (SARS-COV-2) (CORONAVIRUS DISEASE [COVID-19]), AMPLIFIED PROBE TECHNIQUE, MAKING USE OF HIGH THROUGHPUT TECHNOLOGIES AS DESCRIBED BY CMS-2020-01-R: HCPCS

## 2021-08-08 PROCEDURE — U0005 INFEC AGEN DETEC AMPLI PROBE: HCPCS

## 2021-08-11 ENCOUNTER — HOSPITAL ENCOUNTER (OUTPATIENT)
Dept: GENERAL RADIOLOGY | Facility: CLINIC | Age: 51
Discharge: HOME | End: 2021-08-11
Attending: ORTHOPAEDIC SURGERY | Admitting: ORTHOPAEDIC SURGERY
Payer: COMMERCIAL

## 2021-08-11 VITALS — HEART RATE: 82 BPM | SYSTOLIC BLOOD PRESSURE: 143 MMHG | DIASTOLIC BLOOD PRESSURE: 88 MMHG

## 2021-08-11 DIAGNOSIS — Z47.89 AFTERCARE FOLLOWING SURGERY OF THE MUSCULOSKELETAL SYSTEM, NEC: ICD-10-CM

## 2021-08-11 DIAGNOSIS — M25.562 KNEE PAIN, LEFT: ICD-10-CM

## 2021-08-11 LAB
% LINING CELLS, BODY FLUID: 3 %
APPEARANCE FLD: ABNORMAL
COLOR FLD: ABNORMAL
LYMPHOCYTES NFR FLD MANUAL: 56 %
MONOS+MACROS NFR FLD MANUAL: 25 %
NEUTS BAND NFR FLD MANUAL: 16 %
WBC # FLD AUTO: 1623 /UL

## 2021-08-11 PROCEDURE — 89051 BODY FLUID CELL COUNT: CPT | Performed by: ORTHOPAEDIC SURGERY

## 2021-08-11 PROCEDURE — 87075 CULTR BACTERIA EXCEPT BLOOD: CPT | Performed by: ORTHOPAEDIC SURGERY

## 2021-08-11 PROCEDURE — 87205 SMEAR GRAM STAIN: CPT | Performed by: ORTHOPAEDIC SURGERY

## 2021-08-11 PROCEDURE — 20610 DRAIN/INJ JOINT/BURSA W/O US: CPT | Mod: LT

## 2021-08-11 PROCEDURE — 250N000009 HC RX 250: Performed by: PHYSICIAN ASSISTANT

## 2021-08-11 RX ORDER — LIDOCAINE HYDROCHLORIDE 10 MG/ML
5 INJECTION, SOLUTION EPIDURAL; INFILTRATION; INTRACAUDAL; PERINEURAL ONCE
Status: COMPLETED | OUTPATIENT
Start: 2021-08-11 | End: 2021-08-11

## 2021-08-11 RX ORDER — BUPIVACAINE HYDROCHLORIDE 5 MG/ML
INJECTION, SOLUTION EPIDURAL; INTRACAUDAL
Status: DISPENSED
Start: 2021-08-11 | End: 2021-08-11

## 2021-08-11 RX ORDER — LIDOCAINE HYDROCHLORIDE 10 MG/ML
INJECTION, SOLUTION EPIDURAL; INFILTRATION; INTRACAUDAL; PERINEURAL
Status: DISPENSED
Start: 2021-08-11 | End: 2021-08-11

## 2021-08-11 RX ORDER — BUPIVACAINE HYDROCHLORIDE 5 MG/ML
4 INJECTION, SOLUTION EPIDURAL; INTRACAUDAL ONCE
Status: COMPLETED | OUTPATIENT
Start: 2021-08-11 | End: 2021-08-11

## 2021-08-11 RX ADMIN — BUPIVACAINE HYDROCHLORIDE 10 ML: 5 INJECTION, SOLUTION EPIDURAL; INTRACAUDAL; PERINEURAL at 09:51

## 2021-08-11 RX ADMIN — LIDOCAINE HYDROCHLORIDE 3 ML: 10 INJECTION, SOLUTION EPIDURAL; INFILTRATION; INTRACAUDAL; PERINEURAL at 09:50

## 2021-08-11 NOTE — PROGRESS NOTES
Pt was in Radiology today for a left knee aspiration and marcaine injection. Pt tolerated ortho procedure well. Pre procedure pain was 7 post procedure pain level 4.Procedure was completed by JOSEPH MCGILL. There were no complications during this procedure. Pt verbalized understanding of written and verbal instructions and left department in stable and satisfactory condition with . There is no evidence of bleeding or any other complications upon discharge.

## 2021-08-16 LAB
BACTERIA SNV CULT: NO GROWTH
GRAM STAIN RESULT: NORMAL
GRAM STAIN RESULT: NORMAL

## 2021-08-25 LAB — BACTERIA SNV CULT: NORMAL

## 2021-09-04 ENCOUNTER — HEALTH MAINTENANCE LETTER (OUTPATIENT)
Age: 51
End: 2021-09-04

## 2021-10-12 NOTE — ANESTHESIA PREPROCEDURE EVALUATION
Anesthesia Evaluation      Patient summary reviewed   History of anesthetic complications     Airway   Mallampati: I   Pulmonary - normal exam   (+) a smoker                         Cardiovascular - negative ROS and normal exam   Neuro/Psych    (+) anxiety/panic attacks,     Endo/Other    (+) arthritis, obesity,      GI/Hepatic/Renal - negative ROS           Dental                         Anesthesia Plan  Planned anesthetic: spinal and peripheral nerve block    ASA 2     Anesthetic plan and risks discussed with: patient    Post-op plan: routine recovery          
None Done

## 2021-10-16 NOTE — TELEPHONE ENCOUNTER
FUTURE VISIT INFORMATION      FUTURE VISIT INFORMATION:    Date: 9/24    Time: 1:00    Location: [facility]  REFERRAL INFORMATION:    Referring provider:      Referring providers clinic:      Reason for visit/diagnosis  Left knee    RECORDS REQUESTED FROM:       Clinic name Comments Records Status Imaging Status   [facility]  received In pacs   [facility]  received                              RECORDS STATUS      RECORDS RECEIVED FROM: [facility]   DATE RECEIVED: 9/6   NOTES STATUS DETAILS   OFFICE NOTE from referring provider Received 5/24/18, 4/4/17, 2/1/17, 11/23/16, 8/15/16, 7/26/16, 5/11/16, 4/7/16, 1/27/16, 12/22/15, 12/8/15, 11/12/14, 6/26/14, 6/19/14,    OFFICE NOTE from other specialist N/A    DISCHARGE SUMMARY from hospital N/A    DISCHARGE REPORT from the ER N/A    OPERATIVE REPORT Received 3/28/16   MEDICATION LIST N/A    IMPLANT RECORD/STICKER N/A    LABS     CBC/DIFF N/A    CULTURES N/A    INJECTIONS DONE IN RADIOLOGY N/A    MRI Received 4/18/18, 4/19/17, 3/21/17, 12/15/15, 6/19/14   CT SCAN Received 3/21/16   XRAYS (IMAGES & REPORTS) N/A    TUMOR     PATHOLOGY  Slides & report N/A         [] : Yes [Yes] : Yes [2 - 4 times a month (2 pts)] : 2-4 times a month (2 points) [1 or 2 (0 pts)] : 1 or 2 (0 points) [Never (0 pts)] : Never (0 points) [No] : In the past 12 months have you used drugs other than those required for medical reasons? No [No falls in past year] : Patient reported no falls in the past year [0] : 2) Feeling down, depressed, or hopeless: Not at all (0) [de-identified] : 6 cigarettes a day [de-identified] : socially [Audit-CScore] : 2 [de-identified] : lightly active [de-identified] : regular [[id number]] : 0

## 2021-12-09 DIAGNOSIS — Z11.59 ENCOUNTER FOR SCREENING FOR OTHER VIRAL DISEASES: ICD-10-CM

## 2021-12-28 RX ORDER — NAPROXEN SODIUM 220 MG
220 TABLET ORAL 2 TIMES DAILY WITH MEALS
COMMUNITY

## 2021-12-28 ASSESSMENT — MIFFLIN-ST. JEOR: SCORE: 1329.83

## 2021-12-29 NOTE — PHARMACY-ADMISSION MEDICATION HISTORY
Admission medication history interview status for this patient is complete. See [facility] admission navigator for allergy information, prior to admission medications and immunization status.    PTA meds completed by pre-admitting nurse Liv Lees and reviewed by pharmacy       Prior to Admission medications    Medication Sig Last Dose Taking? Auth Provider   acetaminophen (TYLENOL) 325 MG tablet Take 3 tablets (975 mg) by mouth every 6 hours as needed for pain  Yes Shirley Purcell MD   venlafaxine (EFFEXOR-XR) 150 MG 24 hr capsule Take 150 mg by mouth daily   Yes Reported, Patient   naproxen sodium (ANAPROX) 220 MG tablet Take 220 mg by mouth 2 times daily (with meals) 12/28/2021  Reported, Patient

## 2021-12-30 ENCOUNTER — TRANSFERRED RECORDS (OUTPATIENT)
Dept: HEALTH INFORMATION MANAGEMENT | Facility: CLINIC | Age: 51
End: 2021-12-30
Payer: COMMERCIAL

## 2021-12-31 ENCOUNTER — LAB (OUTPATIENT)
Dept: LAB | Facility: CLINIC | Age: 51
End: 2021-12-31
Attending: ORTHOPAEDIC SURGERY
Payer: COMMERCIAL

## 2021-12-31 DIAGNOSIS — Z11.59 ENCOUNTER FOR SCREENING FOR OTHER VIRAL DISEASES: ICD-10-CM

## 2021-12-31 PROCEDURE — U0005 INFEC AGEN DETEC AMPLI PROBE: HCPCS

## 2021-12-31 ASSESSMENT — MIFFLIN-ST. JEOR: SCORE: 1366.12

## 2022-01-01 LAB — SARS-COV-2 RNA RESP QL NAA+PROBE: NEGATIVE

## 2022-01-04 ENCOUNTER — ANESTHESIA EVENT (OUTPATIENT)
Dept: SURGERY | Facility: CLINIC | Age: 52
End: 2022-01-04
Payer: COMMERCIAL

## 2022-01-04 ENCOUNTER — HOSPITAL ENCOUNTER (INPATIENT)
Facility: CLINIC | Age: 52
LOS: 1 days | Discharge: HOME | End: 2022-01-05
Attending: ORTHOPAEDIC SURGERY | Admitting: ORTHOPAEDIC SURGERY
Payer: COMMERCIAL

## 2022-01-04 ENCOUNTER — APPOINTMENT (OUTPATIENT)
Dept: GENERAL RADIOLOGY | Facility: CLINIC | Age: 52
End: 2022-01-04
Attending: ORTHOPAEDIC SURGERY
Payer: COMMERCIAL

## 2022-01-04 ENCOUNTER — ANESTHESIA (OUTPATIENT)
Dept: SURGERY | Facility: CLINIC | Age: 52
End: 2022-01-04
Payer: COMMERCIAL

## 2022-01-04 DIAGNOSIS — Z96.652 S/P REVISION OF TOTAL KNEE, LEFT: Primary | ICD-10-CM

## 2022-01-04 LAB
CREAT SERPL-MCNC: 0.76 MG/DL (ref 0.52–1.04)
GFR SERPL CREATININE-BSD FRML MDRD: >90 ML/MIN/1.73M2
GRAM STAIN RESULT: NORMAL

## 2022-01-04 PROCEDURE — 250N000013 HC RX MED GY IP 250 OP 250 PS 637: Performed by: PHYSICIAN ASSISTANT

## 2022-01-04 PROCEDURE — 250N000013 HC RX MED GY IP 250 OP 250 PS 637: Performed by: ANESTHESIOLOGY

## 2022-01-04 PROCEDURE — 250N000011 HC RX IP 250 OP 636: Performed by: ORTHOPAEDIC SURGERY

## 2022-01-04 PROCEDURE — 360N000078 HC SURGERY LEVEL 5, PER MIN: Performed by: ORTHOPAEDIC SURGERY

## 2022-01-04 PROCEDURE — 87205 SMEAR GRAM STAIN: CPT | Performed by: ORTHOPAEDIC SURGERY

## 2022-01-04 PROCEDURE — 250N000013 HC RX MED GY IP 250 OP 250 PS 637: Performed by: ORTHOPAEDIC SURGERY

## 2022-01-04 PROCEDURE — 999N000141 HC STATISTIC PRE-PROCEDURE NURSING ASSESSMENT: Performed by: ORTHOPAEDIC SURGERY

## 2022-01-04 PROCEDURE — 258N000003 HC RX IP 258 OP 636: Performed by: NURSE ANESTHETIST, CERTIFIED REGISTERED

## 2022-01-04 PROCEDURE — 710N000009 HC RECOVERY PHASE 1, LEVEL 1, PER MIN: Performed by: ORTHOPAEDIC SURGERY

## 2022-01-04 PROCEDURE — 278N000051 HC OR IMPLANT GENERAL: Performed by: ORTHOPAEDIC SURGERY

## 2022-01-04 PROCEDURE — 87070 CULTURE OTHR SPECIMN AEROBIC: CPT | Performed by: ORTHOPAEDIC SURGERY

## 2022-01-04 PROCEDURE — 82565 ASSAY OF CREATININE: CPT | Performed by: ORTHOPAEDIC SURGERY

## 2022-01-04 PROCEDURE — 36415 COLL VENOUS BLD VENIPUNCTURE: CPT | Performed by: ORTHOPAEDIC SURGERY

## 2022-01-04 PROCEDURE — 258N000003 HC RX IP 258 OP 636: Performed by: ORTHOPAEDIC SURGERY

## 2022-01-04 PROCEDURE — 999N000065 XR KNEE PORT LEFT 1/2 VIEWS: Mod: LT

## 2022-01-04 PROCEDURE — 272N000001 HC OR GENERAL SUPPLY STERILE: Performed by: ORTHOPAEDIC SURGERY

## 2022-01-04 PROCEDURE — C1713 ANCHOR/SCREW BN/BN,TIS/BN: HCPCS | Performed by: ORTHOPAEDIC SURGERY

## 2022-01-04 PROCEDURE — 250N000009 HC RX 250: Performed by: ANESTHESIOLOGY

## 2022-01-04 PROCEDURE — 0SRD0J9 REPLACEMENT OF LEFT KNEE JOINT WITH SYNTHETIC SUBSTITUTE, CEMENTED, OPEN APPROACH: ICD-10-PCS | Performed by: ORTHOPAEDIC SURGERY

## 2022-01-04 PROCEDURE — 0SPD0JZ REMOVAL OF SYNTHETIC SUBSTITUTE FROM LEFT KNEE JOINT, OPEN APPROACH: ICD-10-PCS | Performed by: ORTHOPAEDIC SURGERY

## 2022-01-04 PROCEDURE — 250N000011 HC RX IP 250 OP 636: Performed by: ANESTHESIOLOGY

## 2022-01-04 PROCEDURE — 272N000002 HC OR SUPPLY OTHER OPNP: Performed by: ORTHOPAEDIC SURGERY

## 2022-01-04 PROCEDURE — C1776 JOINT DEVICE (IMPLANTABLE): HCPCS | Performed by: ORTHOPAEDIC SURGERY

## 2022-01-04 PROCEDURE — 87176 TISSUE HOMOGENIZATION CULTR: CPT | Performed by: ORTHOPAEDIC SURGERY

## 2022-01-04 PROCEDURE — 250N000011 HC RX IP 250 OP 636: Performed by: PHYSICIAN ASSISTANT

## 2022-01-04 PROCEDURE — 258N000003 HC RX IP 258 OP 636: Performed by: ANESTHESIOLOGY

## 2022-01-04 PROCEDURE — 250N000009 HC RX 250: Performed by: NURSE ANESTHETIST, CERTIFIED REGISTERED

## 2022-01-04 PROCEDURE — 250N000009 HC RX 250: Performed by: ORTHOPAEDIC SURGERY

## 2022-01-04 PROCEDURE — 88305 TISSUE EXAM BY PATHOLOGIST: CPT | Mod: TC | Performed by: ORTHOPAEDIC SURGERY

## 2022-01-04 PROCEDURE — 250N000011 HC RX IP 250 OP 636: Performed by: NURSE ANESTHETIST, CERTIFIED REGISTERED

## 2022-01-04 PROCEDURE — 88331 PATH CONSLTJ SURG 1 BLK 1SPC: CPT | Mod: TC | Performed by: ORTHOPAEDIC SURGERY

## 2022-01-04 PROCEDURE — 258N000001 HC RX 258: Performed by: ORTHOPAEDIC SURGERY

## 2022-01-04 PROCEDURE — 370N000017 HC ANESTHESIA TECHNICAL FEE, PER MIN: Performed by: ORTHOPAEDIC SURGERY

## 2022-01-04 DEVICE — IMPLANTABLE DEVICE
Type: IMPLANTABLE DEVICE | Site: KNEE | Status: FUNCTIONAL
Brand: NEXGEN® LEGACY®

## 2022-01-04 DEVICE — IMPLANTABLE DEVICE
Type: IMPLANTABLE DEVICE | Site: KNEE | Status: FUNCTIONAL
Brand: NEXGEN®

## 2022-01-04 DEVICE — TOBRA FULL DOSE ANTIBIOTIC BONE CEMENT, 10 PACK CATALOG NUMBER IS 6197-9-010
Type: IMPLANTABLE DEVICE | Site: KNEE | Status: FUNCTIONAL
Brand: SIMPLEX

## 2022-01-04 RX ORDER — ACETAMINOPHEN 325 MG/1
650 TABLET ORAL EVERY 4 HOURS PRN
Status: DISCONTINUED | OUTPATIENT
Start: 2022-01-07 | End: 2022-01-05 | Stop reason: HOSPADM

## 2022-01-04 RX ORDER — SODIUM CHLORIDE, SODIUM LACTATE, POTASSIUM CHLORIDE, CALCIUM CHLORIDE 600; 310; 30; 20 MG/100ML; MG/100ML; MG/100ML; MG/100ML
INJECTION, SOLUTION INTRAVENOUS CONTINUOUS
Status: DISCONTINUED | OUTPATIENT
Start: 2022-01-04 | End: 2022-01-04 | Stop reason: HOSPADM

## 2022-01-04 RX ORDER — IBUPROFEN 600 MG/1
600 TABLET, FILM COATED ORAL EVERY 6 HOURS PRN
Status: DISCONTINUED | OUTPATIENT
Start: 2022-01-04 | End: 2022-01-05 | Stop reason: HOSPADM

## 2022-01-04 RX ORDER — ONDANSETRON 2 MG/ML
4 INJECTION INTRAMUSCULAR; INTRAVENOUS EVERY 6 HOURS PRN
Status: DISCONTINUED | OUTPATIENT
Start: 2022-01-04 | End: 2022-01-05 | Stop reason: HOSPADM

## 2022-01-04 RX ORDER — HYDROMORPHONE HCL IN WATER/PF 6 MG/30 ML
0.2 PATIENT CONTROLLED ANALGESIA SYRINGE INTRAVENOUS
Status: DISCONTINUED | OUTPATIENT
Start: 2022-01-04 | End: 2022-01-05 | Stop reason: HOSPADM

## 2022-01-04 RX ORDER — ONDANSETRON 4 MG/1
4 TABLET, ORALLY DISINTEGRATING ORAL EVERY 6 HOURS PRN
Status: DISCONTINUED | OUTPATIENT
Start: 2022-01-04 | End: 2022-01-05 | Stop reason: HOSPADM

## 2022-01-04 RX ORDER — OXYCODONE HYDROCHLORIDE 5 MG/1
5 TABLET ORAL EVERY 4 HOURS PRN
Status: DISCONTINUED | OUTPATIENT
Start: 2022-01-04 | End: 2022-01-04 | Stop reason: HOSPADM

## 2022-01-04 RX ORDER — METOPROLOL TARTRATE 1 MG/ML
2 INJECTION, SOLUTION INTRAVENOUS ONCE
Status: COMPLETED | OUTPATIENT
Start: 2022-01-04 | End: 2022-01-04

## 2022-01-04 RX ORDER — ACETAMINOPHEN 325 MG/1
975 TABLET ORAL EVERY 8 HOURS
Status: DISCONTINUED | OUTPATIENT
Start: 2022-01-04 | End: 2022-01-05 | Stop reason: HOSPADM

## 2022-01-04 RX ORDER — NEOSTIGMINE METHYLSULFATE 1 MG/ML
VIAL (ML) INJECTION PRN
Status: DISCONTINUED | OUTPATIENT
Start: 2022-01-04 | End: 2022-01-04

## 2022-01-04 RX ORDER — NALOXONE HYDROCHLORIDE 0.4 MG/ML
0.4 INJECTION, SOLUTION INTRAMUSCULAR; INTRAVENOUS; SUBCUTANEOUS
Status: DISCONTINUED | OUTPATIENT
Start: 2022-01-04 | End: 2022-01-05 | Stop reason: HOSPADM

## 2022-01-04 RX ORDER — FAMOTIDINE 20 MG/1
20 TABLET, FILM COATED ORAL 2 TIMES DAILY
Status: DISCONTINUED | OUTPATIENT
Start: 2022-01-04 | End: 2022-01-05 | Stop reason: HOSPADM

## 2022-01-04 RX ORDER — METOPROLOL TARTRATE 1 MG/ML
3 INJECTION, SOLUTION INTRAVENOUS ONCE
Status: COMPLETED | OUTPATIENT
Start: 2022-01-04 | End: 2022-01-04

## 2022-01-04 RX ORDER — CEFAZOLIN SODIUM 2 G/100ML
2 INJECTION, SOLUTION INTRAVENOUS EVERY 8 HOURS
Status: COMPLETED | OUTPATIENT
Start: 2022-01-04 | End: 2022-01-05

## 2022-01-04 RX ORDER — FENTANYL CITRATE 50 UG/ML
25 INJECTION, SOLUTION INTRAMUSCULAR; INTRAVENOUS EVERY 5 MIN PRN
Status: DISCONTINUED | OUTPATIENT
Start: 2022-01-04 | End: 2022-01-04 | Stop reason: HOSPADM

## 2022-01-04 RX ORDER — LIDOCAINE 40 MG/G
CREAM TOPICAL
Status: DISCONTINUED | OUTPATIENT
Start: 2022-01-04 | End: 2022-01-05 | Stop reason: HOSPADM

## 2022-01-04 RX ORDER — HYDROMORPHONE HCL IN WATER/PF 6 MG/30 ML
0.4 PATIENT CONTROLLED ANALGESIA SYRINGE INTRAVENOUS
Status: DISCONTINUED | OUTPATIENT
Start: 2022-01-04 | End: 2022-01-05 | Stop reason: HOSPADM

## 2022-01-04 RX ORDER — VANCOMYCIN HYDROCHLORIDE 1 G/20ML
INJECTION, POWDER, LYOPHILIZED, FOR SOLUTION INTRAVENOUS PRN
Status: DISCONTINUED | OUTPATIENT
Start: 2022-01-04 | End: 2022-01-04 | Stop reason: HOSPADM

## 2022-01-04 RX ORDER — ONDANSETRON 4 MG/1
4 TABLET, ORALLY DISINTEGRATING ORAL EVERY 30 MIN PRN
Status: DISCONTINUED | OUTPATIENT
Start: 2022-01-04 | End: 2022-01-04 | Stop reason: HOSPADM

## 2022-01-04 RX ORDER — BISACODYL 10 MG
10 SUPPOSITORY, RECTAL RECTAL DAILY PRN
Status: DISCONTINUED | OUTPATIENT
Start: 2022-01-04 | End: 2022-01-05 | Stop reason: HOSPADM

## 2022-01-04 RX ORDER — POLYETHYLENE GLYCOL 3350 17 G/17G
17 POWDER, FOR SOLUTION ORAL DAILY
Status: DISCONTINUED | OUTPATIENT
Start: 2022-01-05 | End: 2022-01-05 | Stop reason: HOSPADM

## 2022-01-04 RX ORDER — OXYCODONE HYDROCHLORIDE 5 MG/1
5-10 TABLET ORAL
Qty: 60 TABLET | Refills: 0 | Status: SHIPPED | OUTPATIENT
Start: 2022-01-04 | End: 2022-01-05

## 2022-01-04 RX ORDER — FENTANYL CITRATE 50 UG/ML
INJECTION, SOLUTION INTRAMUSCULAR; INTRAVENOUS PRN
Status: DISCONTINUED | OUTPATIENT
Start: 2022-01-04 | End: 2022-01-04

## 2022-01-04 RX ORDER — OXYCODONE HYDROCHLORIDE 5 MG/1
10 TABLET ORAL EVERY 4 HOURS PRN
Status: DISCONTINUED | OUTPATIENT
Start: 2022-01-04 | End: 2022-01-05 | Stop reason: HOSPADM

## 2022-01-04 RX ORDER — NALOXONE HYDROCHLORIDE 0.4 MG/ML
0.2 INJECTION, SOLUTION INTRAMUSCULAR; INTRAVENOUS; SUBCUTANEOUS
Status: DISCONTINUED | OUTPATIENT
Start: 2022-01-04 | End: 2022-01-05 | Stop reason: HOSPADM

## 2022-01-04 RX ORDER — DIPHENHYDRAMINE HCL 25 MG
25 CAPSULE ORAL EVERY 6 HOURS PRN
Status: DISCONTINUED | OUTPATIENT
Start: 2022-01-04 | End: 2022-01-05 | Stop reason: HOSPADM

## 2022-01-04 RX ORDER — SODIUM CHLORIDE, SODIUM LACTATE, POTASSIUM CHLORIDE, CALCIUM CHLORIDE 600; 310; 30; 20 MG/100ML; MG/100ML; MG/100ML; MG/100ML
INJECTION, SOLUTION INTRAVENOUS CONTINUOUS
Status: DISCONTINUED | OUTPATIENT
Start: 2022-01-04 | End: 2022-01-05 | Stop reason: HOSPADM

## 2022-01-04 RX ORDER — ONDANSETRON 2 MG/ML
INJECTION INTRAMUSCULAR; INTRAVENOUS PRN
Status: DISCONTINUED | OUTPATIENT
Start: 2022-01-04 | End: 2022-01-04

## 2022-01-04 RX ORDER — HYDROMORPHONE HYDROCHLORIDE 2 MG/1
2-4 TABLET ORAL
Status: DISCONTINUED | OUTPATIENT
Start: 2022-01-04 | End: 2022-01-05 | Stop reason: HOSPADM

## 2022-01-04 RX ORDER — CELECOXIB 200 MG/1
400 CAPSULE ORAL ONCE
Status: COMPLETED | OUTPATIENT
Start: 2022-01-04 | End: 2022-01-04

## 2022-01-04 RX ORDER — CEFAZOLIN SODIUM/WATER 2 G/20 ML
2 SYRINGE (ML) INTRAVENOUS SEE ADMIN INSTRUCTIONS
Status: DISCONTINUED | OUTPATIENT
Start: 2022-01-04 | End: 2022-01-04 | Stop reason: HOSPADM

## 2022-01-04 RX ORDER — VENLAFAXINE HYDROCHLORIDE 150 MG/1
150 CAPSULE, EXTENDED RELEASE ORAL AT BEDTIME
Status: DISCONTINUED | OUTPATIENT
Start: 2022-01-05 | End: 2022-01-05 | Stop reason: HOSPADM

## 2022-01-04 RX ORDER — HYDROXYZINE HYDROCHLORIDE 25 MG/1
25 TABLET, FILM COATED ORAL EVERY 6 HOURS PRN
Qty: 30 TABLET | Refills: 0 | Status: SHIPPED | OUTPATIENT
Start: 2022-01-04

## 2022-01-04 RX ORDER — HYDRALAZINE HYDROCHLORIDE 20 MG/ML
INJECTION INTRAMUSCULAR; INTRAVENOUS PRN
Status: DISCONTINUED | OUTPATIENT
Start: 2022-01-04 | End: 2022-01-04

## 2022-01-04 RX ORDER — LABETALOL HYDROCHLORIDE 5 MG/ML
10 INJECTION, SOLUTION INTRAVENOUS
Status: DISCONTINUED | OUTPATIENT
Start: 2022-01-04 | End: 2022-01-04 | Stop reason: HOSPADM

## 2022-01-04 RX ORDER — OXYCODONE HYDROCHLORIDE 5 MG/1
5 TABLET ORAL EVERY 4 HOURS PRN
Status: DISCONTINUED | OUTPATIENT
Start: 2022-01-04 | End: 2022-01-05 | Stop reason: HOSPADM

## 2022-01-04 RX ORDER — PROPOFOL 10 MG/ML
INJECTION, EMULSION INTRAVENOUS PRN
Status: DISCONTINUED | OUTPATIENT
Start: 2022-01-04 | End: 2022-01-04

## 2022-01-04 RX ORDER — GLYCOPYRROLATE 0.2 MG/ML
INJECTION, SOLUTION INTRAMUSCULAR; INTRAVENOUS PRN
Status: DISCONTINUED | OUTPATIENT
Start: 2022-01-04 | End: 2022-01-04

## 2022-01-04 RX ORDER — TRANEXAMIC ACID 650 MG/1
1950 TABLET ORAL ONCE
Status: COMPLETED | OUTPATIENT
Start: 2022-01-04 | End: 2022-01-04

## 2022-01-04 RX ORDER — ONDANSETRON 2 MG/ML
4 INJECTION INTRAMUSCULAR; INTRAVENOUS EVERY 30 MIN PRN
Status: DISCONTINUED | OUTPATIENT
Start: 2022-01-04 | End: 2022-01-04 | Stop reason: HOSPADM

## 2022-01-04 RX ORDER — ROPIVACAINE HYDROCHLORIDE 5 MG/ML
INJECTION, SOLUTION EPIDURAL; INFILTRATION; PERINEURAL
Status: COMPLETED | OUTPATIENT
Start: 2022-01-04 | End: 2022-01-04

## 2022-01-04 RX ORDER — LIDOCAINE 40 MG/G
CREAM TOPICAL
Status: DISCONTINUED | OUTPATIENT
Start: 2022-01-04 | End: 2022-01-04 | Stop reason: HOSPADM

## 2022-01-04 RX ORDER — AMOXICILLIN 250 MG
1 CAPSULE ORAL 2 TIMES DAILY
Status: DISCONTINUED | OUTPATIENT
Start: 2022-01-04 | End: 2022-01-05 | Stop reason: HOSPADM

## 2022-01-04 RX ORDER — CEFAZOLIN SODIUM/WATER 2 G/20 ML
2 SYRINGE (ML) INTRAVENOUS
Status: DISCONTINUED | OUTPATIENT
Start: 2022-01-04 | End: 2022-01-04 | Stop reason: HOSPADM

## 2022-01-04 RX ORDER — HYDROMORPHONE HCL IN WATER/PF 6 MG/30 ML
0.4 PATIENT CONTROLLED ANALGESIA SYRINGE INTRAVENOUS EVERY 5 MIN PRN
Status: DISCONTINUED | OUTPATIENT
Start: 2022-01-04 | End: 2022-01-04 | Stop reason: HOSPADM

## 2022-01-04 RX ORDER — PROCHLORPERAZINE MALEATE 5 MG
10 TABLET ORAL EVERY 6 HOURS PRN
Status: DISCONTINUED | OUTPATIENT
Start: 2022-01-04 | End: 2022-01-05 | Stop reason: HOSPADM

## 2022-01-04 RX ORDER — HYDROXYZINE HYDROCHLORIDE 25 MG/1
25 TABLET, FILM COATED ORAL EVERY 6 HOURS PRN
Status: DISCONTINUED | OUTPATIENT
Start: 2022-01-04 | End: 2022-01-05 | Stop reason: HOSPADM

## 2022-01-04 RX ORDER — GABAPENTIN 100 MG/1
100 CAPSULE ORAL 3 TIMES DAILY
Status: DISCONTINUED | OUTPATIENT
Start: 2022-01-04 | End: 2022-01-05 | Stop reason: HOSPADM

## 2022-01-04 RX ORDER — ACETAMINOPHEN 325 MG/1
650 TABLET ORAL EVERY 4 HOURS PRN
Qty: 100 TABLET | Refills: 0 | Status: SHIPPED | OUTPATIENT
Start: 2022-01-04

## 2022-01-04 RX ORDER — AMOXICILLIN 250 MG
1-2 CAPSULE ORAL 2 TIMES DAILY
Qty: 30 TABLET | Refills: 0 | Status: SHIPPED | OUTPATIENT
Start: 2022-01-04

## 2022-01-04 RX ORDER — KETAMINE HYDROCHLORIDE 10 MG/ML
INJECTION INTRAMUSCULAR; INTRAVENOUS PRN
Status: DISCONTINUED | OUTPATIENT
Start: 2022-01-04 | End: 2022-01-04

## 2022-01-04 RX ORDER — SODIUM CHLORIDE, SODIUM LACTATE, POTASSIUM CHLORIDE, CALCIUM CHLORIDE 600; 310; 30; 20 MG/100ML; MG/100ML; MG/100ML; MG/100ML
INJECTION, SOLUTION INTRAVENOUS CONTINUOUS PRN
Status: DISCONTINUED | OUTPATIENT
Start: 2022-01-04 | End: 2022-01-04

## 2022-01-04 RX ADMIN — Medication 2 G: at 12:56

## 2022-01-04 RX ADMIN — HYDROMORPHONE HYDROCHLORIDE 0.4 MG: 0.2 INJECTION, SOLUTION INTRAMUSCULAR; INTRAVENOUS; SUBCUTANEOUS at 18:20

## 2022-01-04 RX ADMIN — PROPOFOL 150 MG: 10 INJECTION, EMULSION INTRAVENOUS at 13:01

## 2022-01-04 RX ADMIN — MIDAZOLAM 2 MG: 1 INJECTION INTRAMUSCULAR; INTRAVENOUS at 12:55

## 2022-01-04 RX ADMIN — METOPROLOL TARTRATE 3 MG: 5 INJECTION INTRAVENOUS at 18:04

## 2022-01-04 RX ADMIN — HYDRALAZINE HYDROCHLORIDE 10 MG: 20 INJECTION INTRAMUSCULAR; INTRAVENOUS at 16:20

## 2022-01-04 RX ADMIN — FENTANYL CITRATE 25 MCG: 50 INJECTION, SOLUTION INTRAMUSCULAR; INTRAVENOUS at 17:13

## 2022-01-04 RX ADMIN — SODIUM CHLORIDE, POTASSIUM CHLORIDE, SODIUM LACTATE AND CALCIUM CHLORIDE: 600; 310; 30; 20 INJECTION, SOLUTION INTRAVENOUS at 11:17

## 2022-01-04 RX ADMIN — SENNOSIDES AND DOCUSATE SODIUM 1 TABLET: 50; 8.6 TABLET ORAL at 21:19

## 2022-01-04 RX ADMIN — HYDROMORPHONE HYDROCHLORIDE 1 MG: 1 INJECTION, SOLUTION INTRAMUSCULAR; INTRAVENOUS; SUBCUTANEOUS at 13:49

## 2022-01-04 RX ADMIN — DIPHENHYDRAMINE HYDROCHLORIDE 25 MG: 25 CAPSULE ORAL at 18:32

## 2022-01-04 RX ADMIN — SODIUM CHLORIDE, POTASSIUM CHLORIDE, SODIUM LACTATE AND CALCIUM CHLORIDE: 600; 310; 30; 20 INJECTION, SOLUTION INTRAVENOUS at 15:00

## 2022-01-04 RX ADMIN — CEFAZOLIN SODIUM 2 G: 10 INJECTION, POWDER, FOR SOLUTION INTRAVENOUS at 21:21

## 2022-01-04 RX ADMIN — SODIUM CHLORIDE, POTASSIUM CHLORIDE, SODIUM LACTATE AND CALCIUM CHLORIDE: 600; 310; 30; 20 INJECTION, SOLUTION INTRAVENOUS at 11:13

## 2022-01-04 RX ADMIN — FENTANYL CITRATE 25 MCG: 50 INJECTION, SOLUTION INTRAMUSCULAR; INTRAVENOUS at 17:23

## 2022-01-04 RX ADMIN — HYDROMORPHONE HYDROCHLORIDE 0.4 MG: 0.2 INJECTION, SOLUTION INTRAMUSCULAR; INTRAVENOUS; SUBCUTANEOUS at 21:23

## 2022-01-04 RX ADMIN — HYDROMORPHONE HYDROCHLORIDE 0.4 MG: 0.2 INJECTION, SOLUTION INTRAMUSCULAR; INTRAVENOUS; SUBCUTANEOUS at 17:36

## 2022-01-04 RX ADMIN — GLYCOPYRROLATE 0.6 MG: 0.2 INJECTION, SOLUTION INTRAMUSCULAR; INTRAVENOUS at 16:16

## 2022-01-04 RX ADMIN — TRANEXAMIC ACID 1950 MG: 650 TABLET ORAL at 11:14

## 2022-01-04 RX ADMIN — LIDOCAINE HYDROCHLORIDE 30 MG: 10 INJECTION, SOLUTION EPIDURAL; INFILTRATION; INTRACAUDAL; PERINEURAL at 13:01

## 2022-01-04 RX ADMIN — ROCURONIUM BROMIDE 20 MG: 50 INJECTION, SOLUTION INTRAVENOUS at 13:35

## 2022-01-04 RX ADMIN — ONDANSETRON HYDROCHLORIDE 4 MG: 2 INJECTION, SOLUTION INTRAVENOUS at 16:16

## 2022-01-04 RX ADMIN — FENTANYL CITRATE 25 MCG: 50 INJECTION, SOLUTION INTRAMUSCULAR; INTRAVENOUS at 17:28

## 2022-01-04 RX ADMIN — FAMOTIDINE 20 MG: 20 TABLET, FILM COATED ORAL at 21:19

## 2022-01-04 RX ADMIN — HYDROMORPHONE HYDROCHLORIDE 2 MG: 2 TABLET ORAL at 23:58

## 2022-01-04 RX ADMIN — CELECOXIB 400 MG: 200 CAPSULE ORAL at 11:13

## 2022-01-04 RX ADMIN — FENTANYL CITRATE 100 MCG: 50 INJECTION, SOLUTION INTRAMUSCULAR; INTRAVENOUS at 13:06

## 2022-01-04 RX ADMIN — FENTANYL CITRATE 150 MCG: 50 INJECTION, SOLUTION INTRAMUSCULAR; INTRAVENOUS at 13:01

## 2022-01-04 RX ADMIN — NEOSTIGMINE METHYLSULFATE 3 MG: 1 INJECTION, SOLUTION INTRAVENOUS at 16:16

## 2022-01-04 RX ADMIN — SODIUM CHLORIDE, POTASSIUM CHLORIDE, SODIUM LACTATE AND CALCIUM CHLORIDE: 600; 310; 30; 20 INJECTION, SOLUTION INTRAVENOUS at 13:13

## 2022-01-04 RX ADMIN — HYDROMORPHONE HYDROCHLORIDE 1 MG: 1 INJECTION, SOLUTION INTRAMUSCULAR; INTRAVENOUS; SUBCUTANEOUS at 13:19

## 2022-01-04 RX ADMIN — HYDRALAZINE HYDROCHLORIDE 10 MG: 20 INJECTION INTRAMUSCULAR; INTRAVENOUS at 13:50

## 2022-01-04 RX ADMIN — HYDROMORPHONE HYDROCHLORIDE 0.4 MG: 0.2 INJECTION, SOLUTION INTRAMUSCULAR; INTRAVENOUS; SUBCUTANEOUS at 17:46

## 2022-01-04 RX ADMIN — Medication 30 MG: at 13:36

## 2022-01-04 RX ADMIN — METOPROLOL TARTRATE 2 MG: 5 INJECTION INTRAVENOUS at 18:05

## 2022-01-04 RX ADMIN — FENTANYL CITRATE 25 MCG: 50 INJECTION, SOLUTION INTRAMUSCULAR; INTRAVENOUS at 17:18

## 2022-01-04 RX ADMIN — ROPIVACAINE HYDROCHLORIDE 20 ML: 5 INJECTION, SOLUTION EPIDURAL; INFILTRATION; PERINEURAL at 12:28

## 2022-01-04 RX ADMIN — OXYCODONE HYDROCHLORIDE 5 MG: 5 TABLET ORAL at 18:07

## 2022-01-04 RX ADMIN — ROCURONIUM BROMIDE 50 MG: 50 INJECTION, SOLUTION INTRAVENOUS at 13:01

## 2022-01-04 RX ADMIN — SODIUM CHLORIDE, POTASSIUM CHLORIDE, SODIUM LACTATE AND CALCIUM CHLORIDE: 600; 310; 30; 20 INJECTION, SOLUTION INTRAVENOUS at 18:10

## 2022-01-04 RX ADMIN — Medication 20 MG: at 13:43

## 2022-01-04 RX ADMIN — HYDROMORPHONE HYDROCHLORIDE 1 MG: 1 INJECTION, SOLUTION INTRAMUSCULAR; INTRAVENOUS; SUBCUTANEOUS at 16:23

## 2022-01-04 RX ADMIN — ACETAMINOPHEN 975 MG: 325 TABLET, FILM COATED ORAL at 21:19

## 2022-01-04 RX ADMIN — GABAPENTIN 100 MG: 100 CAPSULE ORAL at 21:20

## 2022-01-04 ASSESSMENT — LIFESTYLE VARIABLES: TOBACCO_USE: 1

## 2022-01-04 ASSESSMENT — MIFFLIN-ST. JEOR: SCORE: 1375.19

## 2022-01-04 NOTE — BRIEF OP NOTE
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, Z96.659]  Post-operative diagnosis Same as pre-operative diagnosis    Procedure: Procedure(s):  Revision left total knee arthroplasty  Surgeon: Surgeon(s) and Role:     * Bradly Treviño MD - Primary     * Susanna Perla PA-C - Assisting  Anesthesia: Choice   Estimated Blood Loss: 200 ml    Drains: None  Specimens:   ID Type Source Tests Collected by Time Destination   1 : left knee tissue, rule out acute inflammation Tissue Knee, Left SURGICAL PATHOLOGY EXAM Bradly Treviño MD 1/4/2022  1:48 PM    A : Left Knee Fluid Culture Aspirate Knee, Left ANAEROBIC BACTERIAL CULTURE ROUTINE, GRAM STAIN, AEROBIC BACTERIAL CULTURE ROUTINE Bradly Treviño MD 1/4/2022  1:37 PM    B : Left Knee Tissue Culture #1 Tissue Knee, Left ANAEROBIC BACTERIAL CULTURE ROUTINE, GRAM STAIN, AEROBIC BACTERIAL CULTURE ROUTINE Bradly Treviño MD 1/4/2022  1:46 PM    C : Left Knee Tissue Culture #2 Tissue Knee, Left ANAEROBIC BACTERIAL CULTURE ROUTINE, GRAM STAIN, AEROBIC BACTERIAL CULTURE ROUTINE Bradly Treviño MD 1/4/2022  1:46 PM    D : Left Knee Tissue Culture #3 Tissue Knee, Left ANAEROBIC BACTERIAL CULTURE ROUTINE, GRAM STAIN, AEROBIC BACTERIAL CULTURE ROUTINE Bradly Treviño MD 1/4/2022  1:49 PM      Findings:   None.  Complications: None.  Implants:   Implant Name Type Inv. Item Serial No.  Lot No. LRB No. Used Action   BONE CEMENT SIMPLEX W/TOBRAMYCIN 6197-9-001 - [id number] Cement, Bone BONE CEMENT SIMPLEX W/TOBRAMYCIN 6197-9-001  [employer] [id number] Left 1 Implanted   BONE CEMENT SIMPLEX W/TOBRAMYCIN 6197-9-001 - [id number] Cement, Bone BONE CEMENT SIMPLEX W/TOBRAMYCIN 6197-9-001  [employer] [id number] Left 1 Implanted   FEMORAL COMPONENT CR SZ 2 60MM M/L 56MM -  Total Joint Component/Insert   Pivotstream  [id number] Left 1 Explanted   TRAY TIBIA MBT CEMENTED KEEL SZ 2.5 - 1294- Total Joint Component/Insert   [employer] [id number] Left 1 Explanted   TIBIAL INSERT ROTATING PLATFORM, CURVED    Depuy [id number] Left 1 Explanted   CEMENT SURGICAL FULL DOSE     6191-1-001 Cement, Bone   J[employer] [facility] [id number] Left 2 Explanted   IMP STEM KNEE NEXGEN [id number] -407-16 - [id number] Total Joint Component/Insert IMP STEM KNEE NEXGEN [id number] -010-16  [employer] [id number] Left 1 Implanted   NexGen  Constrained Condylar Femoral Component, Size C, Left     [id number] Left 1 Implanted   IMP COMP TIBIAL ZIM NEXGEN 24T28ND -485-00 - [id number] Total Joint Component/Insert IMP COMP TIBIAL ZIM NEXGEN 22O32VP -637-00  [employer] [id number] Left 1 Implanted   11 x 100mm NexGen  Offset Stem Extension     [id number] Left 1 Implanted   NexGen  Size 3, 5mm Thickness, w/ 2 Screws Tibial Block     [id number] Left 2 Implanted   NexGen  Size 3, 5mm Thickness, w/ 2 Screws Tibial Block     [id number] Left 1 Implanted   NexGen  Articular Surface 14mm     [id number] Left 1 Implanted

## 2022-01-04 NOTE — ANESTHESIA PREPROCEDURE EVALUATION
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, Z96.659]    Procedure : Procedure(s):  Revision left total knee arthroplasty          Past Medical History:   Diagnosis Date     Arthritis     knee left     Cholelithiasis      Depressive disorder      Other chronic pain     left knee      Past Surgical History:   Procedure Laterality Date     ARTHROSCOPY KNEE WITH MENISCAL REPAIR Left       SECTION       FOOT SURGERY       JOINT REPLACEMENT Left 2016    uni compartmenta     JOINT REPLACEMENT (aka KNEE) NOS Left      LAPAROSCOPIC CHOLECYSTECTOMY N/A 2020    Procedure: CHOLECYSTECTOMY, LAPAROSCOPIC with cholangiograms;  Surgeon: Shirley Purcell MD;  Location:  OR     [facility] PLASTY KNEE,MED OR LAT COMPARTMT Left 2017    Procedure: CONVERT LEFT UNICOMPARTMENTAL ;  Surgeon: Josh Moseley MD;  Location: [facility];  Service: Orthopedics     [facility] TOTAL KNEE ARTHROPLASTY Left 2017    Procedure: TO TOTAL KNEE ARTHROPLASTY;  Surgeon: Josh Moseley MD;  Location: [facility] OR;  Service: Orthopedics      Allergies   Allergen Reactions     Oxycodone Itching     Zithromax [Azithromycin] Hives      Social History     Tobacco Use     Smoking status: Current Every Day Smoker     Packs/day: 0.50     Years: 15.00     Pack years: 7.50     Types: Cigarettes     Smokeless tobacco: Never Used   Substance Use Topics     Alcohol use: Yes     Comment: daily wine-3 glasses      Wt Readings from Last 1 Encounters:   20 81.4 kg (179 lb 6.4 oz)        Anesthesia Evaluation            ROS/MED HX  ENT/Pulmonary:     (+) tobacco use (Congested w/ smokers cough.), Current use, 1 packs/day,     Neurologic:       Cardiovascular:  - neg cardiovascular ROS   (+) hypertension (Labile)-----    METS/Exercise Tolerance:     Hematologic:  - neg hematologic  ROS     Musculoskeletal:   (+)  arthritis,     GI/Hepatic:    (-) GERD and hepatitis   Renal/Genitourinary:  - neg Renal ROS     Endo:  - neg endo ROS     Psychiatric/Substance Use:     (+) psychiatric history depression     Infectious Disease:  - neg infectious disease ROS     Malignancy:       Other:      (+) , H/O Chronic Pain (Back and knee pain),        Physical Exam    Airway        Mallampati: II   TM distance: > 3 FB   Neck ROM: full   Mouth opening: > 3 cm    Respiratory Devices and Support         Dental           Cardiovascular   cardiovascular exam normal          Pulmonary   pulmonary exam normal            Other findings: Lab Test        04/22/20                       2305          WBC          9.6           HGB          14.3          MCV          91            PLT          272            Lab Test        04/24/20 04/23/20 04/23/20                       0811          0549          0014          NA           138          139          138           POTASSIUM    4.0          4.4          3.9           CHLORIDE     106          106          104           CO2          29           32           30            BUN          11           13           13            CR           0.62         0.76         0.64          ANIONGAP     3            1*           4             ZENA          8.2*         8.7          9.1           GLC          100*         101*         104*            OUTSIDE LABS:  CBC:   Lab Results   Component Value Date    WBC 9.6 04/22/2020    WBC 6.8 01/21/2011    HGB 14.3 04/22/2020    HGB 13.8 01/21/2011    HCT 43.5 04/22/2020    HCT 41.1 01/21/2011     04/22/2020     01/21/2011     BMP:   Lab Results   Component Value Date     04/24/2020     04/23/2020    POTASSIUM 4.0 04/24/2020    POTASSIUM 4.4 04/23/2020    CHLORIDE 106 04/24/2020    CHLORIDE 106 04/23/2020    CO2 29 04/24/2020    CO2 32 04/23/2020    BUN 11 04/24/2020    BUN 13 04/23/2020    CR 0.62 04/24/2020    CR 0.76 04/23/2020      (H) 04/24/2020     (H) 04/23/2020     COAGS:   Lab Results   Component Value Date    PTT 26 01/14/2008    INR 0.95 01/21/2011     POC:   Lab Results   Component Value Date    HCG Negative 04/24/2020     HEPATIC:   Lab Results   Component Value Date    ALBUMIN 3.2 (L) 04/24/2020    PROTTOTAL 6.7 (L) 04/24/2020     (H) 04/24/2020     (H) 04/24/2020    ALKPHOS 297 (H) 04/24/2020    BILITOTAL 0.6 04/24/2020     OTHER:   Lab Results   Component Value Date    ZENA 8.2 (L) 04/24/2020    LIPASE 37 (L) 04/22/2020       Anesthesia Plan    ASA Status:  3      Anesthesia Type: General.     - Airway: ETT   Induction: Propofol.   Maintenance: Balanced.        Consents    Anesthesia Plan(s) and associated risks, benefits, and realistic alternatives discussed. Questions answered and patient/representative(s) expressed understanding.    - Discussed:     - Discussed with:  Patient      - Extended Intubation/Ventilatory Support Discussed: No.      - Patient is DNR/DNI Status: No    Use of blood products discussed: No .     Postoperative Care    Pain management: IV analgesics, Oral pain medications, Peripheral nerve block (Single Shot).   PONV prophylaxis: Ondansetron (or other 5HT-3), Dexamethasone or Solumedrol, Background Propofol Infusion     Comments:           H&P reviewed: Unable to attach H&P to encounter due to EHR limitations. H&P Update: appropriate H&P reviewed, patient examined. No interval changes since H&P (within 30 days).         Sameer Dunn MD

## 2022-01-04 NOTE — ANESTHESIA CARE TRANSFER NOTE
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    Anesthesia Type:   General     Note:      Level of Consciousness: awake  Oxygen Supplementation: face mask    Independent Airway: airway patency satisfactory and stable  Dentition: dentition unchanged  Vital Signs Stable: post-procedure vital signs reviewed and stable  Report to RN Given: handoff report given  Patient transferred to: PACU    Handoff Report: Identifed the Patient, Identified the Reponsible Provider, Reviewed the pertinent medical history, Discussed the surgical course, Reviewed Intra-OP anesthesia mangement and issues during anesthesia, Set expectations for post-procedure period and Allowed opportunity for questions and acknowledgement of understanding      Vitals:  Vitals Value Taken Time   /89 01/04/22 1701   Temp     Pulse 134 01/04/22 1704   Resp 12 01/04/22 1704   SpO2 99 % 01/04/22 1704   Vitals shown include unvalidated device data.    Electronically Signed By: FINA Soliman CRNA  January 4, 2022  5:05 PM

## 2022-01-04 NOTE — ANESTHESIA PROCEDURE NOTES
Adductor canal Procedure Note    Pre-Procedure   Staff -        Anesthesiologist:  Sameer Dunn MD       Performed By: anesthesiologist       Referred By: [facility]       Location: pre-op       Procedure Start/Stop Times: 1/4/2022 12:20 PM and 1/4/2022 12:29 PM       Pre-Anesthestic Checklist: patient identified, IV checked, site marked, risks and benefits discussed, informed consent, monitors and equipment checked, pre-op evaluation, at physician/surgeon's request and post-op pain management  Timeout:       Correct Patient: Yes        Correct Procedure: Yes        Correct Site: Yes        Correct Position: Yes        Correct Laterality: Yes        Site Marked: Yes  Procedure Documentation  Procedure: Adductor canal       Laterality: left       Patient Position: supine       Patient Prep/Sterile Barriers: sterile gloves, mask       Skin prep: Betadine       Local skin infiltrated with 5 mL of 1% lidocaine.        Needle Type: insulated and short bevel       Needle Gauge: 20.        Needle Length (Inches): 4        Ultrasound guided       1. Ultrasound was used to identify targeted nerve, plexus, vascular marker, or fascial plane and place a needle adjacent to it in real-time.       2. Ultrasound was used to visualize the spread of anesthetic in close proximity to the above referenced structure.    Assessment/Narrative         The placement was negative for: blood aspirated, painful injection and site bleeding       Paresthesias: No.      Bolus given via needle. No blood aspirated via catheter.        Secured via.        Insertion/Infusion Method: Single Shot       Complications: none       Injection made incrementally with aspirations every 3 mL.    Medication(s) Administered   Ropivacaine 0.5% PF (Infiltration), 20 mL   Comments:  The surgeon has given a verbal order transferring care of this patient to me for the performance of a regional analgesia block for post-op pain control. It is requested of me  because I am uniquely trained and qualified to perform this block and the surgeon is neither trained nor qualified to perform this procedure.

## 2022-01-04 NOTE — OR NURSING
Pt verbally consented to the regional block prior to block administration.  Ipad consent completed, noting that patient verbally consented to the block, pt unable to sign as she was being taken into the OR.

## 2022-01-05 ENCOUNTER — APPOINTMENT (OUTPATIENT)
Dept: PHYSICAL THERAPY | Facility: CLINIC | Age: 52
End: 2022-01-05
Attending: ORTHOPAEDIC SURGERY
Payer: COMMERCIAL

## 2022-01-05 VITALS
TEMPERATURE: 98.2 F | RESPIRATION RATE: 18 BRPM | DIASTOLIC BLOOD PRESSURE: 74 MMHG | HEART RATE: 83 BPM | HEIGHT: 62 IN | BODY MASS INDEX: 32.94 KG/M2 | SYSTOLIC BLOOD PRESSURE: 144 MMHG | WEIGHT: 179 LBS | OXYGEN SATURATION: 97 %

## 2022-01-05 LAB
FASTING STATUS PATIENT QL REPORTED: ABNORMAL
GLUCOSE BLD-MCNC: 101 MG/DL (ref 70–99)
HGB BLD-MCNC: 11.4 G/DL (ref 11.7–15.7)
PATH REPORT.COMMENTS IMP SPEC: NORMAL
PATH REPORT.COMMENTS IMP SPEC: NORMAL
PATH REPORT.FINAL DX SPEC: NORMAL
PATH REPORT.GROSS SPEC: NORMAL
PATH REPORT.INTRAOP OBS SPEC DOC: NORMAL
PATH REPORT.MICROSCOPIC SPEC OTHER STN: NORMAL
PHOTO IMAGE: NORMAL
PLATELET # BLD AUTO: 232 10E3/UL (ref 150–450)

## 2022-01-05 PROCEDURE — 250N000013 HC RX MED GY IP 250 OP 250 PS 637: Performed by: INTERNAL MEDICINE

## 2022-01-05 PROCEDURE — 36415 COLL VENOUS BLD VENIPUNCTURE: CPT | Performed by: ORTHOPAEDIC SURGERY

## 2022-01-05 PROCEDURE — 258N000003 HC RX IP 258 OP 636: Performed by: ORTHOPAEDIC SURGERY

## 2022-01-05 PROCEDURE — 97161 PT EVAL LOW COMPLEX 20 MIN: CPT | Mod: GP

## 2022-01-05 PROCEDURE — 85018 HEMOGLOBIN: CPT | Performed by: ORTHOPAEDIC SURGERY

## 2022-01-05 PROCEDURE — 88305 TISSUE EXAM BY PATHOLOGIST: CPT | Mod: 26 | Performed by: PATHOLOGY

## 2022-01-05 PROCEDURE — 85049 AUTOMATED PLATELET COUNT: CPT | Performed by: ORTHOPAEDIC SURGERY

## 2022-01-05 PROCEDURE — 250N000011 HC RX IP 250 OP 636: Performed by: ORTHOPAEDIC SURGERY

## 2022-01-05 PROCEDURE — 120N000001 HC R&B MED SURG/OB

## 2022-01-05 PROCEDURE — 88331 PATH CONSLTJ SURG 1 BLK 1SPC: CPT | Mod: 26 | Performed by: PATHOLOGY

## 2022-01-05 PROCEDURE — 250N000013 HC RX MED GY IP 250 OP 250 PS 637: Performed by: ORTHOPAEDIC SURGERY

## 2022-01-05 PROCEDURE — 97110 THERAPEUTIC EXERCISES: CPT | Mod: GP

## 2022-01-05 PROCEDURE — 82947 ASSAY GLUCOSE BLOOD QUANT: CPT | Performed by: ORTHOPAEDIC SURGERY

## 2022-01-05 RX ORDER — HYDROMORPHONE HYDROCHLORIDE 2 MG/1
2-4 TABLET ORAL
Qty: 60 TABLET | Refills: 0 | Status: SHIPPED | OUTPATIENT
Start: 2022-01-05

## 2022-01-05 RX ORDER — VENLAFAXINE HYDROCHLORIDE 150 MG/1
150 CAPSULE, EXTENDED RELEASE ORAL DAILY
Status: DISCONTINUED | OUTPATIENT
Start: 2022-01-05 | End: 2022-01-05

## 2022-01-05 RX ADMIN — HYDROMORPHONE HYDROCHLORIDE 2 MG: 2 TABLET ORAL at 03:26

## 2022-01-05 RX ADMIN — SENNOSIDES AND DOCUSATE SODIUM 1 TABLET: 50; 8.6 TABLET ORAL at 08:34

## 2022-01-05 RX ADMIN — POLYETHYLENE GLYCOL 3350 17 G: 17 POWDER, FOR SOLUTION ORAL at 08:35

## 2022-01-05 RX ADMIN — HYDROMORPHONE HYDROCHLORIDE 4 MG: 2 TABLET ORAL at 08:35

## 2022-01-05 RX ADMIN — FAMOTIDINE 20 MG: 20 TABLET, FILM COATED ORAL at 08:35

## 2022-01-05 RX ADMIN — GABAPENTIN 100 MG: 100 CAPSULE ORAL at 08:35

## 2022-01-05 RX ADMIN — CEFAZOLIN SODIUM 2 G: 10 INJECTION, POWDER, FOR SOLUTION INTRAVENOUS at 03:27

## 2022-01-05 RX ADMIN — IBUPROFEN 600 MG: 600 TABLET, FILM COATED ORAL at 01:54

## 2022-01-05 RX ADMIN — VENLAFAXINE HYDROCHLORIDE 150 MG: 150 CAPSULE, EXTENDED RELEASE ORAL at 00:33

## 2022-01-05 RX ADMIN — ACETAMINOPHEN 975 MG: 325 TABLET, FILM COATED ORAL at 08:34

## 2022-01-05 RX ADMIN — HYDROMORPHONE HYDROCHLORIDE 4 MG: 2 TABLET ORAL at 11:20

## 2022-01-05 RX ADMIN — ENOXAPARIN SODIUM 40 MG: 40 INJECTION SUBCUTANEOUS at 11:20

## 2022-01-05 ASSESSMENT — ACTIVITIES OF DAILY LIVING (ADL): ADLS_ACUITY_SCORE: 4

## 2022-01-05 NOTE — PLAN OF CARE
Patient vital signs are at baseline: Yes  Patient able to ambulate as they were prior to admission or with assist devices provided by therapies during their stay:  Yes  Patient MUST void prior to discharge:  Yes  Patient able to tolerate oral intake:  Yes  Pain has adequate pain control using Oral analgesics:  Yes    A/O x 4.  VSS, afebrile.  Pain managed with oral dilaudid/tylenol and ice.  CMS intact.  Acewrap CDI.  Up with SBA and crutches.  Voiding adequately.  Tolerating regular diet.  Reviewed discharge instructions and medications with patient and spouse, answered questions.  Pt sent home with filled Rx of oral dilaudid, Rx's of atarax, senna, tylenol and aspirin sent to [facility] in [place].  Walked out by PAM.

## 2022-01-05 NOTE — OP NOTE
Procedure Date: 01/04/2022    PREOPERATIVE DIAGNOSIS:  Failed left total knee arthroplasty.    POSTOPERATIVE DIAGNOSIS:  Failed left total knee arthroplasty.    PROCEDURE:  Left revision left total knee arthroplasty using a Ashwini LCCK knee system.    SURGEON:  Bradly Treviño MD.    FIRST ASSISTANT:  Susanna Perla PA-C.    INDICATIONS FOR PROCEDURE:  Ms. Herrera is a very pleasant 51-year-old female with a long history of problems with this left knee.  She had a unicompartmental knee revised to a rotating platform cruciate retaining total knee system a couple of years ago.  It appears that she has some lucency around the femoral component, and she has some flexion instability with this implant.  We had a long discussion of treatment options.  I recommended revision arthroplasty if she is miserable.  She has undergone an infection workup with normal laboratory values and normal aspiration and no other evidence for infection.  She understands the risks, benefits, and alternatives of revision total knee arthroplasty and wished to proceed with surgery.    NARRATIVE OF EVENTS:  After thorough preoperative evaluation and proper identification of the patient to be operated on, Ms. Herrera was taken to the operating room where she underwent a general anesthetic, placed supine on the operating table and her left leg was prepped and draped in the usual sterile manner.  After appropriate surgical pause to confirm the patient's extremity to be operated on, 30 minutes after patient received 2 grams of Ancef, her left leg was elevated and tourniquet was raised to 300 mmHg on the left upper thigh.  Approached the left knee through a previous incision, which was kind of a curvilinear incision medially along the patella.  Skin and soft tissue sharply dissected down to the patella where a median parapatellar arthrotomy was performed.  I performed a mild medial release according to preoperative templating, everted the patella,  removed the patellar fat pad, removed the infrapatellar synovectomy, everted the patella, did an extensive synovectomy throughout the knee joint.  We did take samples of both fluid and tissue for culture as well as sending tissue for pathology, which showed no evidence of acute inflammation.  At this point, we then paid our attention to the implants.  We removed the polyethylene with a minimal amount of difficulty, removed the femoral component was a minimal amount of difficulty, Slightly more difficulty removing the tibial component, but that popped out relatively easily with minimal bone loss.  Once this was done, we then removed all the excess cement from the knee.  Really, there was not very much and the femoral component came off quite easily with a somewhat fibrous union in a number of areas.  I removed more from the tibial side.  We then reamed the tibia up to fit a size 11 x 100 mm tibial stem and we then revised our proximal tibial resection perpendicular to this intramedullary guide.  We then sized the proximal tibia.  We determined that we needed an offset stem and a size 3 AP wedged tibial component.  We punched for tibial keel and placed our trial implant into position, a size 11 high offset stem with a size 3 AP wedged tibia.  We then paid our attention to the femur.  Here, we reamed this up to fit a size 16 x 100 mm straight femoral stem.  We sized the distal femur sized to fit a size C Ashwini NexGen femoral component.  We cleaned up our anterior, posterior and chamfer resections and made our box cut for the LCCK implant and then placed our trial LCCK size C femoral stem with a 16 x 100 mm femoral stem in place. With this in place, found that a 17 mm PS insert gave us good stability and full range of motion. Due to the thickness of the polyethylene, we decided that we would augment the tibial side by 5 mm and then we prepared to place our real implants.  Our patella was well fixed, so we left that  intact and it tracked reasonably nicely.  So after thoroughly irrigating the knee and using 2 batches of Simplex cement with tobramycin, we cemented in a cemented metaphyseal, a size 3 AP wedged tibial component with an 11 x 100 mm offset tibial stem and we augmented this with two 5 mm augments, 1 on the medial and 1 on the lateral side.  Once this was done, we then cemented it into position.  We then cemented in the size C femoral component with a 16 x 100 mm femoral stem and this was cemented again metaphyseal with the 2 batches of Simplex tobramycin cement.  Once implants were in position, removed all the excess cement from the knee.  We allowed the cement to harden and once this was done, we removed all the excess cement from the knee, retrialed our polyethylene inserts, found that the 14 mm PS polyethylene insert gave us the best stability and full range of motion, but we felt that a LCCK implant would benefit the patient with increased constraint, so a 14 mm thick LCCK tibial polyethylene was impacted into position, inset into position using the set screw.  We thoroughly irrigated the knee one final time with a dilute Betadine solution followed by saline.  We closed the arthrotomy with interrupted #1 Vicryl sutures and a running #1 Stratafix suture.  We closed the skin and soft tissue with absorbable sutures and an Exofin skin closure.  We did place 1 gram of powdered vancomycin deep to the arthrotomy when we closed.  The patient was placed in a well-padded postop dressing, taken to recovery room in stable condition.  She tolerated the procedure without difficulty.    Bradly Treviño MD        D: 2022   T: 2022   MT: [id number]    Name:     YAMILETH ORTIZMolly  MRN:      [id number]        Account:        [id number]   :      1970           Procedure Date: 2022     Document: [id number]

## 2022-01-05 NOTE — PROGRESS NOTES
"Orthopedic Surgery  1/5/2022    S: Patient voices no complaints today.     O: Blood pressure (!) 142/74, pulse 76, temperature 98.2  F (36.8  C), temperature source Temporal, resp. rate 14, height 1.575 m (5' 2\"), weight 81.2 kg (179 lb), SpO2 99 %.  Lab Results   Component Value Date    HGB 14.3 04/22/2020     Lab Results   Component Value Date    INR 0.95 01/21/2011        Neurovascularly intact.  Calves are negative bilaterally, both soft and nontender.  The wound is C/D/I.  The wound looks good with minimal erythema of the surrounding skin.    A: Ms. Herrera is doing well status post Procedure(s):  Revision left total knee arthroplasty.    P: Continue physical therapy.   Anticoagulation with lovenox, home on ASA  Pain management, oral dilaudid due to oxycodone allergy  Discharge planning, home today if doing well with PT    Bradly Treviño MD  [phone]    "

## 2022-01-05 NOTE — PLAN OF CARE
OT: Orders received. Chart reviewed and discussed with care team.  OT not indicated as this is her 3rd knee surgery, has good understanding of ADL management following surgery and has a supportive spouse to A with ADls/IADLs as needed.  Defer discharge recommendations to ortho team.  Will complete orders.

## 2022-01-05 NOTE — PROGRESS NOTES
01/05/22 1100   Quick Adds   Type of Visit Initial PT Evaluation   Living Environment   People in home spouse;child(carolyn), adult   Current Living Arrangements house   Living Environment Comments 2 ANNA; all needs met on the main level.    Self-Care   Usual Activity Tolerance good   Current Activity Tolerance moderate   Activity/Exercise/Self-Care Comment IND with mobility at baseline. Has crutches.    Disability/Function   Fall history within last six months no   General Information   Onset of Illness/Injury or Date of Surgery 01/04/22   Referring Physician Hudson HUERTA   Patient/Family Therapy Goals Statement (PT) Return home   Pertinent History of Current Problem (include personal factors and/or comorbidities that impact the POC)  52 y/o female with PMH significant for HTN and depression who was admitted on 1/4/2021 for failed left TKA and is POD#1 left revision TKA without complications.   Existing Precautions/Restrictions fall   Weight-Bearing Status - LLE weight-bearing as tolerated   Cognition   Orientation Status (Cognition) oriented x 4   Pain Assessment   Patient Currently in Pain Yes, see Vital Sign flowsheet  (7-8/10)   Range of Motion (ROM)   ROM Comment Decreased L knee flexion/extension AROM due to high pain levels.    Strength   Strength Comments Sanjeev with SLR on the L    Bed Mobility   Comment (Bed Mobility) Supine<>sit with IND   Transfers   Transfer Safety Comments Sit<>stand with mod I    Gait/Stairs (Locomotion)   Distance in Feet (Required for LE Total Joints) 30   Comment (Gait/Stairs) Ambulates 15'x2 with FWW, mod I. Step through gait, decreased heel/toe.   Balance   Balance Comments Good with B UE support on FWW   Clinical Impression   Criteria for Skilled Therapeutic Intervention yes, treatment indicated   PT Diagnosis (PT) Impaired functional mobility   Influenced by the following impairments Decreased L LE AROM/strength   Functional limitations due to impairments Decreased IND with gait    Clinical Presentation Evolving/Changing   Clinical Presentation Rationale High pain this session limiting full mobility assessment.    Clinical Decision Making (Complexity) low complexity   Therapy Frequency (PT) One time eval and treatment only   Predicted Duration of Therapy Intervention (days/wks) 1 day   Planned Therapy Interventions (PT) patient/family education;home program guidelines   Risk & Benefits of therapy have been explained evaluation/treatment results reviewed;care plan/treatment goals reviewed;participants voiced agreement with care plan;participants included;patient   PT Discharge Planning    PT Discharge Recommendation (DC Rec)   (Per ortho)   PT Rationale for DC Rec Pt is currently mod I for bed mobility, transfers and ambulation with a FWW.    Total Evaluation Time   Total Evaluation Time (Minutes) 5

## 2022-01-09 LAB
BACTERIA SNV CULT: NO GROWTH
BACTERIA TISS BX CULT: NO GROWTH

## 2022-01-11 LAB
BACTERIA TISS BX CULT: NORMAL

## 2022-01-13 NOTE — DISCHARGE SUMMARY
"Discharge Summary    Kacy Herrera MRN# [id number]   YOB: 1970 Age: 51 year old     Date of Admission: 1/4/2022    Date of Discharge: 1/5/2022    Reason for Admission: Kacy Herrera is an 51 year old female who was admitted to the hospital following surgery with Dr. Bradly Treviño.    Preoperative Diagnosis: Failed total knee arthroplasty (H) [T84.018A, Z96.659]    Postoperative Diagnosis: Failed total knee arthroplasty (H) [T84.018A, Z96.659]    Procedure Completed: revision left total knee arthroplasty     Hospital Course:  Ms. Herrera was admitted and underwent the above procedure. The patient tolerated the procedure well. There were no complications. Following surgery she was admitted to the floor.  During her stay she did not require any blood transfusions.  Her last hemoglobin was noted to be   Lab Results   Component Value Date    HGB 11.4 01/05/2022    HGB 14.3 04/22/2020   .  Her pain was controlled with oral pain medication.  During her stay she progressed well in physical therapy and all the therapy goals were met.     Discharge Physical Exam:  BP (!) 144/74 (BP Location: Left arm)   Pulse 83   Temp 98.2  F (36.8  C) (Temporal)   Resp 18   Ht 1.575 m (5' 2\")   Wt 81.2 kg (179 lb)   SpO2 97%   BMI 32.74 kg/m    Neurovascularly intact, distal pulses present bilaterally.  Calves are negative bilaterally, both soft and nontender.  The wound looks good with minimal erythema of the surrounding skin.    Assessment: Ms. Herrera is stable and doing well status post revision left total knee arthroplasty on POD #1.    Plan: We will discharge her home on analgesics and deep venous thrombosis prophylaxis.  She will follow-up with Kayleigh Jones PA-C or Dr. Bradly Treviño approximately 2 weeks from surgery.  She may call [phone] to schedule an appointment.    Discharge Instructions:  Discharge diet: Regular   Discharge activity: Weight bear as tolerated.  Advance from the walker to a " cane as tolerated.  Discontinue the use of cane when comfortable.   Physical therapy: Work on therapy exercises given in the hospital.     Discharge follow-up: Follow up with Kayleigh Jones PA-C in 12-16 days.   Anticoagulation Asprin 325 mg twice daily for 5 weeks.   Wound care: Leave Prineo dressing intact. Keep wound clean and dry.  May get incision area wet in shower but do not soak or scrub.         Meds:     Medication List      Started    aspirin 325 MG EC tablet  Commonly known as: ASA  325 mg, Oral, 2 TIMES DAILY     HYDROmorphone 2 MG tablet  Commonly known as: DILAUDID  2-4 mg, Oral, EVERY 3 HOURS PRN     hydrOXYzine 25 MG tablet  Commonly known as: ATARAX  25 mg, Oral, EVERY 6 HOURS PRN     senna-docusate 8.6-50 MG tablet  Commonly known as: SENOKOT-S/PERICOLACE  1-2 tablets, Oral, 2 TIMES DAILY, Take while on oral narcotics to prevent or treat constipation.        Modified    * acetaminophen 325 MG tablet  Commonly known as: TYLENOL  975 mg, Oral, EVERY 6 HOURS PRN  What changed: Another medication with the same name was added. Make sure you understand how and when to take each.     * acetaminophen 325 MG tablet  Commonly known as: TYLENOL  650 mg, Oral, EVERY 4 HOURS PRN  What changed: You were already taking a medication with the same name, and this prescription was added. Make sure you understand how and when to take each.         * This list has 2 medication(s) that are the same as other medications prescribed for you. Read the directions carefully, and ask your doctor or other care provider to review them with you.                    Kayleigh Jones PA-C  O: [phone]

## 2022-01-18 LAB — BACTERIA SNV CULT: NORMAL

## 2022-08-29 PROCEDURE — 96361 HYDRATE IV INFUSION ADD-ON: CPT

## 2022-08-29 PROCEDURE — 99285 EMERGENCY DEPT VISIT HI MDM: CPT | Mod: 25

## 2022-08-29 PROCEDURE — 96360 HYDRATION IV INFUSION INIT: CPT

## 2022-08-29 PROCEDURE — 93005 ELECTROCARDIOGRAM TRACING: CPT

## 2022-08-30 ENCOUNTER — APPOINTMENT (OUTPATIENT)
Dept: ULTRASOUND IMAGING | Facility: CLINIC | Age: 52
End: 2022-08-30
Attending: EMERGENCY MEDICINE
Payer: COMMERCIAL

## 2022-08-30 ENCOUNTER — HOSPITAL ENCOUNTER (EMERGENCY)
Facility: CLINIC | Age: 52
Discharge: HOME | End: 2022-08-30
Attending: EMERGENCY MEDICINE | Admitting: EMERGENCY MEDICINE
Payer: COMMERCIAL

## 2022-08-30 VITALS
SYSTOLIC BLOOD PRESSURE: 128 MMHG | TEMPERATURE: 97.4 F | BODY MASS INDEX: 32.56 KG/M2 | DIASTOLIC BLOOD PRESSURE: 72 MMHG | HEART RATE: 92 BPM | RESPIRATION RATE: 20 BRPM | OXYGEN SATURATION: 97 % | WEIGHT: 178 LBS

## 2022-08-30 DIAGNOSIS — M25.469 KNEE SWELLING: ICD-10-CM

## 2022-08-30 DIAGNOSIS — R00.2 POUNDING HEARTBEAT: ICD-10-CM

## 2022-08-30 LAB
ANION GAP SERPL CALCULATED.3IONS-SCNC: 11 MMOL/L (ref 7–15)
ATRIAL RATE - MUSE: 95 BPM
BUN SERPL-MCNC: 12.5 MG/DL (ref 6–20)
CALCIUM SERPL-MCNC: 9.5 MG/DL (ref 8.6–10)
CHLORIDE SERPL-SCNC: 104 MMOL/L (ref 98–107)
CREAT SERPL-MCNC: 0.72 MG/DL (ref 0.51–0.95)
D DIMER PPP FEU-MCNC: 0.63 UG/ML FEU (ref 0–0.5)
DEPRECATED HCO3 PLAS-SCNC: 28 MMOL/L (ref 22–29)
DIASTOLIC BLOOD PRESSURE - MUSE: NORMAL MMHG
ERYTHROCYTE [DISTWIDTH] IN BLOOD BY AUTOMATED COUNT: 13.1 % (ref 10–15)
GFR SERPL CREATININE-BSD FRML MDRD: >90 ML/MIN/1.73M2
GLUCOSE SERPL-MCNC: 105 MG/DL (ref 70–99)
HCT VFR BLD AUTO: 44.9 % (ref 35–47)
HGB BLD-MCNC: 14.1 G/DL (ref 11.7–15.7)
HOLD SPECIMEN: NORMAL
HOLD SPECIMEN: NORMAL
INTERPRETATION ECG - MUSE: NORMAL
MCH RBC QN AUTO: 29.6 PG (ref 26.5–33)
MCHC RBC AUTO-ENTMCNC: 31.4 G/DL (ref 31.5–36.5)
MCV RBC AUTO: 94 FL (ref 78–100)
P AXIS - MUSE: 71 DEGREES
PLATELET # BLD AUTO: 237 10E3/UL (ref 150–450)
POTASSIUM SERPL-SCNC: 4.4 MMOL/L (ref 3.4–5.3)
PR INTERVAL - MUSE: 168 MS
PROCALCITONIN SERPL IA-MCNC: 0.03 NG/ML
QRS DURATION - MUSE: 80 MS
QT - MUSE: 354 MS
QTC - MUSE: 444 MS
R AXIS - MUSE: 40 DEGREES
RBC # BLD AUTO: 4.77 10E6/UL (ref 3.8–5.2)
SODIUM SERPL-SCNC: 143 MMOL/L (ref 136–145)
SYSTOLIC BLOOD PRESSURE - MUSE: NORMAL MMHG
T AXIS - MUSE: 46 DEGREES
TROPONIN T SERPL HS-MCNC: 7 NG/L
VENTRICULAR RATE- MUSE: 95 BPM
WBC # BLD AUTO: 8.7 10E3/UL (ref 4–11)

## 2022-08-30 PROCEDURE — 80048 BASIC METABOLIC PNL TOTAL CA: CPT | Performed by: EMERGENCY MEDICINE

## 2022-08-30 PROCEDURE — 84484 ASSAY OF TROPONIN QUANT: CPT | Performed by: EMERGENCY MEDICINE

## 2022-08-30 PROCEDURE — 258N000003 HC RX IP 258 OP 636: Performed by: EMERGENCY MEDICINE

## 2022-08-30 PROCEDURE — 84145 PROCALCITONIN (PCT): CPT | Performed by: EMERGENCY MEDICINE

## 2022-08-30 PROCEDURE — 36415 COLL VENOUS BLD VENIPUNCTURE: CPT | Performed by: EMERGENCY MEDICINE

## 2022-08-30 PROCEDURE — 85027 COMPLETE CBC AUTOMATED: CPT | Performed by: EMERGENCY MEDICINE

## 2022-08-30 PROCEDURE — 93971 EXTREMITY STUDY: CPT | Mod: LT

## 2022-08-30 PROCEDURE — 85379 FIBRIN DEGRADATION QUANT: CPT | Performed by: EMERGENCY MEDICINE

## 2022-08-30 RX ORDER — IOPAMIDOL 755 MG/ML
60 INJECTION, SOLUTION INTRAVASCULAR ONCE
Status: DISCONTINUED | OUTPATIENT
Start: 2022-08-30 | End: 2022-08-30 | Stop reason: HOSPADM

## 2022-08-30 RX ADMIN — SODIUM CHLORIDE 1000 ML: 9 INJECTION, SOLUTION INTRAVENOUS at 01:43

## 2022-08-30 ASSESSMENT — ACTIVITIES OF DAILY LIVING (ADL)
ADLS_ACUITY_SCORE: 35
ADLS_ACUITY_SCORE: 35

## 2022-08-30 NOTE — ED PROVIDER NOTES
History     Chief Complaint:  Palpitations and Knee Pain       HPI   Kacy Herrera is a 52 year old female past medical history significant for recurrent left knee arthroplasty most recently completed several months ago presenting for increasing pain and swelling of her knee, she is undergoing evaluation with orthopedics who is planning arthrocentesis later next week who developed palpitations with feelings of pounding of her heart in her neck though denies any overt chest pain, chest pressure or shortness of breath.  She reports ongoing symptoms despite heart rate in the 90s.  She denies any leg swelling other than around her left knee, denies any travel, fevers chills or runny nose.    ROS:  Review of Systems  10 point ROS completed, negative except as indicated in the HPI.       Allergies:  Oxycodone  Zithromax [Azithromycin]     Medications:    acetaminophen (TYLENOL) 325 MG tablet  acetaminophen (TYLENOL) 325 MG tablet  HYDROmorphone (DILAUDID) 2 MG tablet  hydrOXYzine (ATARAX) 25 MG tablet  naproxen sodium (ANAPROX) 220 MG tablet  senna-docusate (SENOKOT-S/PERICOLACE) 8.6-50 MG tablet  venlafaxine (EFFEXOR-XR) 150 MG 24 hr capsule        Past Medical History:    Past Medical History:   Diagnosis Date     Arthritis      Cholelithiasis      Depressive disorder      Other chronic pain        Past Surgical History:    Past Surgical History:   Procedure Laterality Date     ARTHROPLASTY REVISION KNEE Left 2022    Procedure: Left revision left total knee arthroplasty using a Ashwini LCCK knee system;  Surgeon: Bradly Treviño MD;  Location:  OR     ARTHROSCOPY KNEE WITH MENISCAL REPAIR Left       SECTION       FOOT SURGERY       JOINT REPLACEMENT Left 2016    uni compartmenta     JOINT REPLACEMENT (aka KNEE) NOS Left      LAPAROSCOPIC CHOLECYSTECTOMY N/A 2020    Procedure: CHOLECYSTECTOMY, LAPAROSCOPIC with cholangiograms;  Surgeon: Shirley Purcell MD;  Location:  OR      ZZC PLASTY KNEE,MED OR LAT COMPARTMT Left 11/1/2017    Procedure: CONVERT LEFT UNICOMPARTMENTAL ;  Surgeon: Josh Moseley MD;  Location[facility] OR;  Service: Orthopedics     [facility] 11/1/2017    Procedure: TO TOTAL KNEE ARTHROPLASTY;  Surgeon: Josh Moseley MD;  Location: [facility];  Service: Orthopedics        Family History:    family history is not on file.    Social History:   reports that she has been smoking cigarettes. She has a 7.50 pack-year smoking history. She has never used smokeless tobacco. She reports current alcohol use. She reports that she does not use drugs.  PCP: Keerthi Ceja     Physical Exam     Patient Vitals for the past 24 hrs:   BP Temp Temp src Pulse Resp SpO2 Weight   08/29/22 2242 (!) 163/98 97.4  F (36.3  C) Temporal 104 20 96 % 80.7 kg (178 lb)        Physical Exam  Constitutional: Alert, attentive, GCS 15   HENT:    Nose: Nose normal.    Mouth/Throat: Oropharynx is clear, mucous membranes are moist  Eyes: EOM are normal, anicteric, conjugate gaze  CV: regular rate and rhythm; no murmurs  Chest: Effort normal and breath sounds clear without wheezing or rales, symmetric bilaterally   GI:  non tender. No distension. No guarding or rebound.    MSK: Large curvilinear left anterior knee incision, knee is mildly warm to the touch though no erythema, moderately swollen about the knee, trace bilateral lower extremity swelling.    Neurological: Alert, attentive, moving all extremities equally.   Skin: Skin is warm and dry.        Emergency Department Course   ECG:  ECG results from 08/30/22   EKG 12 lead     Value    Systolic Blood Pressure     Diastolic Blood Pressure     Ventricular Rate 95    Atrial Rate 95    CO Interval 168    QRS Duration 80        QTc 444    P Axis 71    R AXIS 40    T Axis 46    Interpretation ECG      Sinus rhythm  Septal infarct , age undetermined  Abnormal ECG  When compared with ECG of 21-JAN-2011 12:43,  Septal  infarct is now Present         Imaging:  US Lower Extremity Venous Duplex Left   Final Result   IMPRESSION:   1.  No deep venous thrombosis in the left lower extremity.         Report per radiology    Laboratory:  Labs Ordered and Resulted from Time of ED Arrival to Time of ED Departure   CBC WITH PLATELETS - Abnormal       Result Value    WBC Count 8.7      RBC Count 4.77      Hemoglobin 14.1      Hematocrit 44.9      MCV 94      MCH 29.6      MCHC 31.4 (*)     RDW 13.1      Platelet Count 237     BASIC METABOLIC PANEL - Abnormal    Creatinine 0.72      Sodium 143      Potassium 4.4      Urea Nitrogen 12.5      Chloride 104      Carbon Dioxide (CO2) 28      Anion Gap 11      Glucose 105 (*)     GFR Estimate >90      Calcium 9.5     D DIMER QUANTITATIVE - Abnormal    D-Dimer Quantitative 0.63 (*)    TROPONIN T, HIGH SENSITIVITY - Normal    Troponin T, High Sensitivity 7     PROCALCITONIN - Normal    Procalcitonin 0.03            Interventions:  Medications   iopamidol (ISOVUE-370) solution 60 mL (has no administration in time range)   sodium chloride 0.9 % bag 500mL for CT scan flush use (has no administration in time range)   0.9% sodium chloride BOLUS (0 mLs Intravenous Stopped 22 5757)        Disposition:  The patient was discharged to home.     Impression & Plan    Medical Decision Makin-year-old woman with recurrent left knee arthroplasty presenting for persistent pounding chest sensation felt cramping in her neck though denies any neck pain, denies any overt chest pain shortness of breath.  This is in the setting of left knee swelling for which she is due to have a diagnostic arthrocentesis with Ortho next week.  She does not have any infectious symptoms, white count and Pro-Roland are negative with low suspicion for septic arthritis but did recommend continuation of evaluation with Ortho.  D-dimer is mildly elevated 0.63, left lower extremity ultrasound negative for DVT.  I did discuss with her  reports of pounding chest that CT PE study would be reasonable however patient declined.  Given her only symptom is heart pounding without pleuritic chest pain, shortness of breath and she has normal vital signs here I do not think empiric anticoagulation is warranted I did recommend PCP follow-up and she was discharged home.  Clear return precautions reviewed.    Diagnosis:    ICD-10-CM    1. Knee swelling  M25.469    2. Pounding heartbeat  R00.2       Sameer Reddy MD  [employer]  6:45 AM 08/30/22          Sameer Reddy MD  08/30/22 0647

## 2022-08-30 NOTE — ED TRIAGE NOTES
"Pt arrives w/ complaint of palpitations. Pt reports for the last few days she \"doesn't feel well\". Reports she has been feeling her heart racing when palpating her neck. Pt denies CP, denies SOB, denies dizziness. Pt reports she had a knee replacement in January and that she has been having ongoing knee pain. Pt reports that she has noticed some increasing swelling the last few days and that her ortho doctor was considering tapping her knee soon to check for infection. Pt denies fever at home. Pt is A&O x 4.     "

## 2022-08-30 NOTE — DISCHARGE INSTRUCTIONS
I would continue to follow-up with orthopedics as you have planned for your knee.  She develop chest pain, chest pressure, passout or more shortness of breath you should return to the emergency department.  Otherwise I recommend adequate hydration, transitioning from sitting to standing and walking slowly.  Keep in mind, a blood clot has not been entirely excluded however I do think putting you on blood thinners at this time is warranted that you recommend you follow-up with your primary doctor for recheck

## 2022-09-01 ENCOUNTER — HOSPITAL ENCOUNTER (OUTPATIENT)
Dept: ULTRASOUND IMAGING | Facility: CLINIC | Age: 52
Discharge: HOME | End: 2022-09-01
Attending: ORTHOPAEDIC SURGERY | Admitting: ORTHOPAEDIC SURGERY
Payer: COMMERCIAL

## 2022-09-01 DIAGNOSIS — M25.562 LEFT KNEE PAIN: ICD-10-CM

## 2022-09-01 DIAGNOSIS — Z47.89 AFTERCARE FOLLOWING SURGERY OF THE MUSCULOSKELETAL SYSTEM: ICD-10-CM

## 2022-09-01 PROCEDURE — 76882 US LMTD JT/FCL EVL NVASC XTR: CPT | Mod: LT

## 2022-10-16 ENCOUNTER — HEALTH MAINTENANCE LETTER (OUTPATIENT)
Age: 52
End: 2022-10-16

## 2023-06-16 NOTE — CONSULTS
Kacy Herrera is a 50 y/o female with PMH significant for HTN and depression who was admitted on 1/4/2021 for failed left TKA and is POD#1 left revision TKA without complications. Full chart review performed and patient is only on Effexor for management of depression. Blood pressure intermittently elevated but not on medical management PTA., suspect related to pain and can follow outpatient for monitoring. Contacted and discussed with bedside RN. No current concerns with plans to discharge home today. Hospital medicine will sign off at this time but if any concerns arise prior to discharge please contact and patient can be seen at this time.     Silke Escudero PA-C  [facility]    Cheek To Nose Interpolation Flap Division And Inset Text: Division and inset of the cheek to nose interpolation flap was performed to achieve optimal aesthetic result, restore normal anatomic appearance and avoid distortion of normal anatomy, expedite and facilitate wound healing, achieve optimal functional result and because linear closure either not possible or would produce suboptimal result. The patient was prepped and draped in the usual manner. The pedicle was infiltrated with local anesthesia. The pedicle was sectioned with a #15 blade. The pedicle was de-bulked and trimmed to match the shape of the defect. Hemostasis was achieved. The flap donor site and free margin of the flap were secured with deep buried sutures and the wound edges were re-approximated.

## 2023-11-04 ENCOUNTER — HEALTH MAINTENANCE LETTER (OUTPATIENT)
Age: 53
End: 2023-11-04

## 2024-01-24 NOTE — PLAN OF CARE
Order signed and faxed to [facility] @[phone]   Physical Therapy Discharge Summary    Reason for therapy discharge:    All goals and outcomes met, no further needs identified.    Progress towards therapy goal(s). See goals on Care Plan in [facility] electronic health record for goal details.  Goals met    Therapy recommendation(s):    Per ortho.

## 2024-12-22 ENCOUNTER — HEALTH MAINTENANCE LETTER (OUTPATIENT)
Age: 54
End: 2024-12-22

## 2025-04-11 ENCOUNTER — OFFICE VISIT (OUTPATIENT)
Dept: NEUROSURGERY | Facility: CLINIC | Age: 55
End: 2025-04-11
Payer: COMMERCIAL

## 2025-04-11 VITALS
WEIGHT: 181 LBS | HEIGHT: 62 IN | SYSTOLIC BLOOD PRESSURE: 152 MMHG | BODY MASS INDEX: 33.31 KG/M2 | DIASTOLIC BLOOD PRESSURE: 93 MMHG

## 2025-04-11 DIAGNOSIS — M54.16 LUMBAR RADICULOPATHY: Primary | ICD-10-CM

## 2025-04-11 PROCEDURE — 1125F AMNT PAIN NOTED PAIN PRSNT: CPT | Performed by: NURSE PRACTITIONER

## 2025-04-11 PROCEDURE — 3077F SYST BP >= 140 MM HG: CPT | Performed by: NURSE PRACTITIONER

## 2025-04-11 PROCEDURE — 99204 OFFICE O/P NEW MOD 45 MIN: CPT | Performed by: NURSE PRACTITIONER

## 2025-04-11 PROCEDURE — 3080F DIAST BP >= 90 MM HG: CPT | Performed by: NURSE PRACTITIONER

## 2025-04-11 RX ORDER — GABAPENTIN 300 MG/1
CAPSULE ORAL
Qty: 90 CAPSULE | Refills: 1 | Status: SHIPPED | OUTPATIENT
Start: 2025-04-11

## 2025-04-11 RX ORDER — DICLOFENAC SODIUM 75 MG/1
75 TABLET, DELAYED RELEASE ORAL 2 TIMES DAILY PRN
Qty: 60 TABLET | Refills: 0 | Status: SHIPPED | OUTPATIENT
Start: 2025-04-11

## 2025-04-11 ASSESSMENT — PAIN SCALES - GENERAL: PAINLEVEL_OUTOF10: SEVERE PAIN (7)

## 2025-04-11 NOTE — PATIENT INSTRUCTIONS
Prescribed diclofenac 75mg today. Please take twice daily as prescribed, as needed for pain control as well as to aid in decreasing inflammation.   Take medication with a full glass of water and with food.   *Do not take Advil, Ibuprofen, Aleve, or Naproxen while taking this medication as it can cause organ failure if taken together*  This medication does have risks if taken long-term, these risks include: gastrointestinal irritation, kidney dysfunction, and cardiovascular effects.  We will check your kidney function as needed while you're on this medication.  Stop taking this medication if you have intolerable side effects or blood in the stool.     Prescribed Gabapentin today, 300 mg tablets, to be titrated up to 1 capsule 3 times a day as tolerated for your nerve pain. Please follow Gabapentin dosing chart below.    Gabapentin 300mg Dosing Chart    DATE  MORNING AFTERNOON BEDTIME    Day 1 0 0 1    Day 2 0 0 1    Day 3 0 0 1    Day 4 1 0 1    Day 5 1 0 1    Day 6 1 0 1    Day 7 1 1 1    Day 8 1 1 1    Day 9 1 1 1     Continue medication, taking 1 capsules three times daily    Please call if you have any questions regarding how to take your medication  Clinic Phone [phone]      You can continue your flexeril     A Lumbar epidural steroid injection was ordered and you will receive a phone call to book this as well      ~Please call our [facility] Nurse Navigation line [phone] with any questions or concerns about your treatment plan, if symptoms worsen and you would like to be seen urgently, or if you have any new or worsening numbness, weakness, or problems controlling bladder and bowel function.  ~You are also welcome to contact Vilma Vázquez via [employer], but please be aware that responses to [employer] message may take 2-3 days due to the high volume of patients seen in clinic.

## 2025-04-11 NOTE — PROGRESS NOTES
ASSESSMENT: Kacy Herrera is a 55 year old female who presents for consultation at the request of PCP Radha Ewing, who presents today for new patient evaluation of:    -lumbar radiculopathy    Patient is neurologically intact on exam. No myelopathic or red flag symptoms.   Too much pain to do PT at this time . No relief so far with right S1 TF JEREMY 1wk ago at [facility]. We reviewed her recent lumbar MRI report together. Imaging report states she has left subarticular disc protrusion at L4-5 resulting in moderate lateral recess narrowing and encroaching upon the descending left L5 nerves, and a central and left subarticular disc protrusion at L5-S1 abutting the left greater than right descending S1 nerves. Her leg pain is clearly down the back matching S1 distribution. It is certainly possible her recent injection will start to kick in over the next week. She expresses concern however that it has not improved symptoms yet. We will therefore order a IL JEREMY L5-S1 as next step in case symptoms do not improve  She had left hemilaminectomies at L4-5 and L5-S1. We talked about a neurosurgery referral if severe pain continues despite injections. We will start gabapentin and diclofenac. Feels she did not experience much improvement in the past, but would recommend re-trialing this and slowly increase dose prior to ordering a 2nd line agent such as lyrica.  Would suggest follow up appt 2-4 wks post injection. We will work on getting her imaging post visit if she is able to have a disc made            No data to display                     Diagnoses and all orders for this visit:  Lumbar radiculopathy  -     PAIN Interlaminar Epidural Steroid Injection Lumbar/Sacral; Future  -     gabapentin (NEURONTIN) 300 MG capsule; 300mg po at bedtime x 3 days, then twice daily x 3 days, then three times daily ongoing  -     diclofenac (VOLTAREN) 75 MG EC tablet; Take 1 tablet (75 mg) by mouth 2 times daily as needed for moderate  pain.      PLAN:  Reviewed spine anatomy and disease process. Discussed diagnosis and treatment options with the patient today. A shared decision making model was used.  The patient's values and choices were respected. The following represents what was discussed and decided upon by the provider and the patient.      -DIAGNOSTIC TESTS:  Images were personally reviewed and interpreted and explained to patient today using a spine model.   -did not add any additional tests today    -PHYSICAL THERAPY:    -Patient in too much pain to participate in PT at this time  Discussed the importance of core strengthening, ROM, stretching exercises with the patient and how each of these entities is important in decreasing pain.  Explained to the patient that the purpose of physical therapy is to teach the patient a home exercise program.  These exercises need to be performed every day in order to decrease pain and prevent future occurrences of pain.        -MEDICATIONS:    --start diclofenac  -start gabapentin and increase per titration schedule  Discussed multiple medication options today with patient. Discussed risks, side effects, and proper use of medications. Patient verbalized understanding.    -INTERVENTIONS:    -Discussed the role of injections with patient today. Patient would be a good candidate in the future for either epidural steroid injections or medial branch blocks if indicated based on symptoms and supported by imaging results.  -Risks, benefits, and role of injections were discussed with patient today. Orders placed for IL JEREMY L5-S1    -PATIENT EDUCATION:  Total time of 40 minutes, on the day of service, spent with the patient, reviewing the chart, placing orders, and documenting.   -Today we also discussed the pros and cons of the current treatment plan.    -FOLLOW-UP:   we will call with imaging results    Advised patient to call the [facility] if symptoms worsen, new numbness or weakness develops in the legs,  or if they develop new or worsening problems controlling bladder or bowel function.   ______________________________________________________________________    SUBJECTIVE:    HPI:  Kacy Herrera  Is a 55 year old female Left L5-S1 and L4-5 hemilaminotomy at [facility] with hx anxiety and depression who presents today for new patient evaluation of right lumbar radiculopathy    March 8th sx started spontaneously when putting her shoes on.  Severe R gluteal pain into the entire posterior leg. Heel is numb.  Can't sit, stand, or lie down. Notes nausea.  During PT postop knee replacment had L leg sciatica but not nearly as bad as this  Then had a left L5-S1 and L4-5 hemilami    1 mo now of no relief. Was placed on prednisone x 5 days and no improvement,  flexeril as well which she is mostly taking at night. She is not sleeping a lot. Had mri and then referred for an injection.   underwent RIGHT S1 TRANSFORAMINAL LUMBOSACRAL [facility] 1 wk ago.    She has tried aleve, tylenol, advil  Ice     No PT so far    She was on gabapentin at one point for her knee in the past (not for the back) but didn't seem helpful at the time      -Treatment to Date:     -Medications:  Tylenol  Ibuprofen  Aleve  Flexeril  prednisone    Current Outpatient Medications   Medication Sig Dispense Refill    acetaminophen (TYLENOL) 325 MG tablet Take 2 tablets (650 mg) by mouth every 4 hours as needed for other (mild pain) 100 tablet 0    diclofenac (VOLTAREN) 75 MG EC tablet Take 1 tablet (75 mg) by mouth 2 times daily as needed for moderate pain. 60 tablet 0    gabapentin (NEURONTIN) 300 MG capsule 300mg po at bedtime x 3 days, then twice daily x 3 days, then three times daily ongoing 90 capsule 1    levonorgestrel (MIRENA) 52 MG (20 mcg/day) IUD 1 each by Intrauterine route.      naproxen sodium (ANAPROX) 220 MG tablet Take 220 mg by mouth 2 times daily (with meals)      venlafaxine (EFFEXOR-XR) 150 MG 24 hr capsule Take 150 mg by mouth daily        HYDROmorphone (DILAUDID) 2 MG tablet Take 1-2 tablets (2-4 mg) by mouth every 3 hours as needed for moderate to severe pain (Patient not taking: Reported on 2025) 60 tablet 0    hydrOXYzine (ATARAX) 25 MG tablet Take 1 tablet (25 mg) by mouth every 6 hours as needed for itching or anxiety (with pain, moderate pain) (Patient not taking: Reported on 2025) 30 tablet 0    senna-docusate (SENOKOT-S/PERICOLACE) 8.6-50 MG tablet Take 1-2 tablets by mouth 2 times daily Take while on oral narcotics to prevent or treat constipation. (Patient not taking: Reported on 2025) 30 tablet 0     No current facility-administered medications for this visit.       Allergies   Allergen Reactions    Oxycodone Itching    Zithromax [Azithromycin] Hives       Past Medical History:   Diagnosis Date    Arthritis     knee left    Cholelithiasis     Depressive disorder     Other chronic pain     left knee        Patient Active Problem List   Diagnosis    Pain in joint, ankle and foot    Biliary colic    S/P revision of total knee, left       Past Surgical History:   Procedure Laterality Date    ARTHROPLASTY REVISION KNEE Left 2022    Procedure: Left revision left total knee arthroplasty using a Ashwini LCCK knee system;  Surgeon: Bradly Treviño MD;  Location:  OR    ARTHROSCOPY KNEE WITH MENISCAL REPAIR Left      SECTION      FOOT SURGERY      JOINT REPLACEMENT Left 2016    uni compartmenta    JOINT REPLACEMENT (aka KNEE) NOS Left     LAPAROSCOPIC CHOLECYSTECTOMY N/A 2020    Procedure: CHOLECYSTECTOMY, LAPAROSCOPIC with cholangiograms;  Surgeon: Shirley Purcell MD;  Location:  OR    [facility] PLASTY KNEE,MED OR LAT COMPARTMT Left 2017    Procedure: CONVERT LEFT UNICOMPARTMENTAL ;  Surgeon: Josh Moseley MD;  Location: [facility];  Service: Orthopedics    [facility] TOTAL KNEE ARTHROPLASTY Left 2017    Procedure: TO TOTAL KNEE ARTHROPLASTY;  Surgeon: Josh Moseley MD;  Location: [facility]  "Main OR;  Service: Orthopedics       No family history on file.    Reviewed past medical, surgical, and family history with patient found on new patient intake packet located in EMR Media tab.         OBJECTIVE:  BP (!) 152/93   Ht 5' 2\" (1.575 m)   Wt 181 lb (82.1 kg)   BMI 33.11 kg/m      PHYSICAL EXAMINATION:    --CONSTITUTIONAL:  Vital signs as above.  No acute distress.  The patient is well nourished and well groomed. Appears uncomfortable dt pain  --PSYCHIATRIC:  Appropriate mood and affect. The patient is awake, alert, oriented to person, place, time and answering questions appropriately with clear speech.    --SKIN:  Skin over the face, bilateral lower extremities, and posterior torso is clean, dry, intact without rashes.    --RESPIRATORY: Normal rhythm and effort. No abnormal accessory muscle breathing patterns noted.   --ABDOMINAL:  Non-distended.    --GROSS MOTOR: Gait is non-antalgic. Arises w pain from a seated position. Toe walking and heel walking are normal without significant difficulty.      --LOWER EXTREMITY MOTOR TESTING:  Hip flexion: right 5/5, left 5/5  Hip abduction: right 5/5, left 5/5  Hip adduction: right 5/5, left 5/5   Quads: right 5/5, left 5/5  Hamstrings: right 5/5, left 5/5  Dorsiflexion: right 5/5, left 5/5  Plantar flexion: right 5/5, left 5/5    Great toe MTP extension/EHL: right 5/5, left 5/5    --NEUROLOGICAL:  2/4 patellar and achilles reflexes bilaterally. Sensation to light touch is intact throughout both lower extremities. Babinski is negative. No clonus.    --MUSCULOSKELETAL: Lumbar spine inspection reveals no evidence of deformity. No point tenderness to palpation lumbar spine. Painful lumbar extension.  Some R lower paraspinal musculature tenderness    Straight leg raising is positive on the R.    --HIPS: Full range of motion bilaterally. Negative MARCELLE and Negative FADIR bilaterally. Negative hip joint tenderness to palp.    --SACROILIAC JOINT: Gaenslen's Test was " negative. Thigh thrust was negative. One finger point test was negative. right SI joint tenderness to palpation     --VASCULAR:  Bilateral lower extremities are warm without any discoloration.  There is no pitting edema of the bilateral lower extremities.    RESULTS:   Prior medical records from [facility] and [facility] Everywhere were reviewed today.    Imaging: report findings were explained using a spine model. There were no images available at time of appt    MRI lumbar spine without contrast  Comparison: none relevant available    Findings:  There are 5 lumbar type vertebral segments identified. The vertebral body heights are maintained without evidence of fracture. There is no discrete T1 hypointense marrow infiltrating process.     The conus medullaris terminates at L1, normal. Cauda equina appears unremarkable.  T12-L1: no spinal canal or neural foraminal stenosis  L1-2: no spinal canal or neural foraminal stenosis  L2-3: disc bulge combined with facet arthropathy results in mild spinal canal narrowing. Mild neural foraminal narrowing. Mild facet arthropathy  L3-4: disc degeneration. Shallow left subarticular disc protrusion results in mild left lateral recess narrowing. Encroachment upon the descending L4 nerve. Mild neural foraminal narrowing. Mild facet arthropathy.  L4-5: disc degeneration. Suggestion of prior left hemilaminectomy. Left subarticular disc protrusion results in moderate left lateral recess narrowing encroaches upon the descending left L5 nerves. Mild neural foraminal narrowing.  L5-S1: disc degeneration with opposing endplate marrow edema (Modic type 1). Prior left hemilaminectomy. Central and left subarticular disc protrusion abuts the left-greater-than-right descending S1 nerves. Mild neural foraminal narrowing. Mild facet arthropathy.    Mild sacroiliac joint osteoarthritis    Impression:  At L3-4, left subarticular disc protrusion encroaches upon the descending left L4 nerves.  At  L4-5, left subarticular disc protrusion results in moderate lateral recess narrowing and encroaches upon the descending left L5 nerves.  At L5-S1, central and left subarticular disc protrusion abuts the left-greater-than-right descending S1 nerves.    Dictated by Santhosh Barone MD 3/28/25 3:51:41 pm          This note was dictated using voice recognition software. Any grammatical or context distortions are unintentional and inherent to the software.       Vilma Vázquez FNP-C  [facility]  O. [phone]

## 2025-04-11 NOTE — LETTER
4/11/2025       RE: Kacy Herrera  [address]   [place] [address]     Dear Colleague,    Thank you for referring your patient, Kacy Herrera, to the  [facility] at [facility]. Please see a copy of my visit note below.    ASSESSMENT: Kacy Herrera is a 55 year old female who presents for consultation at the request of PCP Radha Ewing, who presents today for new patient evaluation of:    -lumbar radiculopathy    Patient is neurologically intact on exam. No myelopathic or red flag symptoms.   Too much pain to do PT at this time . No relief so far with right S1 TF JEREMY 1wk ago at [facility]. We reviewed her recent lumbar MRI report together. Imaging report states she has left subarticular disc protrusion at L4-5 resulting in moderate lateral recess narrowing and encroaching upon the descending left L5 nerves, and a central and left subarticular disc protrusion at L5-S1 abutting the left greater than right descending S1 nerves. Her leg pain is clearly down the back matching S1 distribution. It is certainly possible her recent injection will start to kick in over the next week. She expresses concern however that it has not improved symptoms yet. We will therefore order a IL JEREMY L5-S1 as next step in case symptoms do not improve  She had left hemilaminectomies at L4-5 and L5-S1. We talked about a neurosurgery referral if severe pain continues despite injections. We will start gabapentin and diclofenac. Feels she did not experience much improvement in the past, but would recommend re-trialing this and slowly increase dose prior to ordering a 2nd line agent such as lyrica.  Would suggest follow up appt 2-4 wks post injection. We will work on getting her imaging post visit if she is able to have a disc made            No data to display                     Diagnoses and all orders for this visit:  Lumbar radiculopathy  -     PAIN  Interlaminar Epidural Steroid Injection Lumbar/Sacral; Future  -     gabapentin (NEURONTIN) 300 MG capsule; 300mg po at bedtime x 3 days, then twice daily x 3 days, then three times daily ongoing  -     diclofenac (VOLTAREN) 75 MG EC tablet; Take 1 tablet (75 mg) by mouth 2 times daily as needed for moderate pain.      PLAN:  Reviewed spine anatomy and disease process. Discussed diagnosis and treatment options with the patient today. A shared decision making model was used.  The patient's values and choices were respected. The following represents what was discussed and decided upon by the provider and the patient.      -DIAGNOSTIC TESTS:  Images were personally reviewed and interpreted and explained to patient today using a spine model.   -did not add any additional tests today    -PHYSICAL THERAPY:    -Patient in too much pain to participate in PT at this time  Discussed the importance of core strengthening, ROM, stretching exercises with the patient and how each of these entities is important in decreasing pain.  Explained to the patient that the purpose of physical therapy is to teach the patient a home exercise program.  These exercises need to be performed every day in order to decrease pain and prevent future occurrences of pain.        -MEDICATIONS:    --start diclofenac  -start gabapentin and increase per titration schedule  Discussed multiple medication options today with patient. Discussed risks, side effects, and proper use of medications. Patient verbalized understanding.    -INTERVENTIONS:    -Discussed the role of injections with patient today. Patient would be a good candidate in the future for either epidural steroid injections or medial branch blocks if indicated based on symptoms and supported by imaging results.  -Risks, benefits, and role of injections were discussed with patient today. Orders placed for IL JEREMY L5-S1    -PATIENT EDUCATION:  Total time of 40 minutes, on the day of service, spent  with the patient, reviewing the chart, placing orders, and documenting.   -Today we also discussed the pros and cons of the current treatment plan.    -FOLLOW-UP:   we will call with imaging results    Advised patient to call the Spine Center if symptoms worsen, new numbness or weakness develops in the legs, or if they develop new or worsening problems controlling bladder or bowel function.   ______________________________________________________________________    SUBJECTIVE:    HPI:  Kacy Herrera  Is a 55 year old female Left L5-S1 and L4-5 hemilaminotomy at [facility] with hx anxiety and depression who presents today for new patient evaluation of right lumbar radiculopathy    March 8th sx started spontaneously when putting her shoes on.  Severe R gluteal pain into the entire posterior leg. Heel is numb.  Can't sit, stand, or lie down. Notes nausea.  During PT postop knee replacment had L leg sciatica but not nearly as bad as this  Then had a left L5-S1 and L4-5 hemilami    1 mo now of no relief. Was placed on prednisone x 5 days and no improvement,  flexeril as well which she is mostly taking at night. She is not sleeping a lot. Had mri and then referred for an injection.   underwent RIGHT S1 TRANSFORAMINAL LUMBOSACRAL [facility] 1 wk ago.    She has tried aleve, tylenol, advil  Ice     No PT so far    She was on gabapentin at one point for her knee in the past (not for the back) but didn't seem helpful at the time      -Treatment to Date:     -Medications:  Tylenol  Ibuprofen  Aleve  Flexeril  prednisone    Current Outpatient Medications   Medication Sig Dispense Refill     acetaminophen (TYLENOL) 325 MG tablet Take 2 tablets (650 mg) by mouth every 4 hours as needed for other (mild pain) 100 tablet 0     diclofenac (VOLTAREN) 75 MG EC tablet Take 1 tablet (75 mg) by mouth 2 times daily as needed for moderate pain. 60 tablet 0     gabapentin (NEURONTIN) 300 MG capsule 300mg po at bedtime x 3 days, then twice daily  x 3 days, then three times daily ongoing 90 capsule 1     levonorgestrel (MIRENA) 52 MG (20 mcg/day) IUD 1 each by Intrauterine route.       naproxen sodium (ANAPROX) 220 MG tablet Take 220 mg by mouth 2 times daily (with meals)       venlafaxine (EFFEXOR-XR) 150 MG 24 hr capsule Take 150 mg by mouth daily        HYDROmorphone (DILAUDID) 2 MG tablet Take 1-2 tablets (2-4 mg) by mouth every 3 hours as needed for moderate to severe pain (Patient not taking: Reported on 2025) 60 tablet 0     hydrOXYzine (ATARAX) 25 MG tablet Take 1 tablet (25 mg) by mouth every 6 hours as needed for itching or anxiety (with pain, moderate pain) (Patient not taking: Reported on 2025) 30 tablet 0     senna-docusate (SENOKOT-S/PERICOLACE) 8.6-50 MG tablet Take 1-2 tablets by mouth 2 times daily Take while on oral narcotics to prevent or treat constipation. (Patient not taking: Reported on 2025) 30 tablet 0     No current facility-administered medications for this visit.       Allergies   Allergen Reactions     Oxycodone Itching     Zithromax [Azithromycin] Hives       Past Medical History:   Diagnosis Date     Arthritis     knee left     Cholelithiasis      Depressive disorder      Other chronic pain     left knee        Patient Active Problem List   Diagnosis     Pain in joint, ankle and foot     Biliary colic     S/P revision of total knee, left       Past Surgical History:   Procedure Laterality Date     ARTHROPLASTY REVISION KNEE Left 2022    Procedure: Left revision left total knee arthroplasty using a Ashwini LCCK knee system;  Surgeon: Bradly Treviño MD;  Location: [facility] OR     ARTHROSCOPY KNEE WITH MENISCAL REPAIR Left       SECTION       FOOT SURGERY       JOINT REPLACEMENT Left 2016    uni compartmenta     JOINT REPLACEMENT (aka KNEE) NOS Left      LAPAROSCOPIC CHOLECYSTECTOMY N/A 2020    Procedure: CHOLECYSTECTOMY, LAPAROSCOPIC with cholangiograms;  Surgeon: Shirley Purcell MD;   "Location:  OR     C PLASTY KNEE,MED OR LAT COMPARTMT Left 11/1/2017    Procedure: CONVERT LEFT UNICOMPARTMENTAL ;  Surgeon: Josh Moseley MD;  Location: [facility];  Service: Orthopedics     [facility] TOTAL KNEE ARTHROPLASTY Left 11/1/2017    Procedure: TO TOTAL KNEE ARTHROPLASTY;  Surgeon: Josh Moseley MD;  Location: [facility];  Service: Orthopedics       No family history on file.    Reviewed past medical, surgical, and family history with patient found on new patient intake packet located in EMR Media tab.         OBJECTIVE:  BP (!) 152/93   Ht 5' 2\" (1.575 m)   Wt 181 lb (82.1 kg)   BMI 33.11 kg/m      PHYSICAL EXAMINATION:    --CONSTITUTIONAL:  Vital signs as above.  No acute distress.  The patient is well nourished and well groomed. Appears uncomfortable dt pain  --PSYCHIATRIC:  Appropriate mood and affect. The patient is awake, alert, oriented to person, place, time and answering questions appropriately with clear speech.    --SKIN:  Skin over the face, bilateral lower extremities, and posterior torso is clean, dry, intact without rashes.    --RESPIRATORY: Normal rhythm and effort. No abnormal accessory muscle breathing patterns noted.   --ABDOMINAL:  Non-distended.    --GROSS MOTOR: Gait is non-antalgic. Arises w pain from a seated position. Toe walking and heel walking are normal without significant difficulty.      --LOWER EXTREMITY MOTOR TESTING:  Hip flexion: right 5/5, left 5/5  Hip abduction: right 5/5, left 5/5  Hip adduction: right 5/5, left 5/5   Quads: right 5/5, left 5/5  Hamstrings: right 5/5, left 5/5  Dorsiflexion: right 5/5, left 5/5  Plantar flexion: right 5/5, left 5/5    Great toe MTP extension/EHL: right 5/5, left 5/5    --NEUROLOGICAL:  2/4 patellar and achilles reflexes bilaterally. Sensation to light touch is intact throughout both lower extremities. Babinski is negative. No clonus.    --MUSCULOSKELETAL: Lumbar spine inspection reveals no evidence of deformity. No point " tenderness to palpation lumbar spine. Painful lumbar extension.  Some R lower paraspinal musculature tenderness    Straight leg raising is positive on the R.    --HIPS: Full range of motion bilaterally. Negative MARCELLE and Negative FADIR bilaterally. Negative hip joint tenderness to palp.    --SACROILIAC JOINT: Gaenslen's Test was negative. Thigh thrust was negative. One finger point test was negative. right SI joint tenderness to palpation     --VASCULAR:  Bilateral lower extremities are warm without any discoloration.  There is no pitting edema of the bilateral lower extremities.    RESULTS:   Prior medical records from [facility] and [facility] Everywhere were reviewed today.    Imaging: report findings were explained using a spine model. There were no images available at time of appt    MRI lumbar spine without contrast  Comparison: none relevant available    Findings:  There are 5 lumbar type vertebral segments identified. The vertebral body heights are maintained without evidence of fracture. There is no discrete T1 hypointense marrow infiltrating process.     The conus medullaris terminates at L1, normal. Cauda equina appears unremarkable.  T12-L1: no spinal canal or neural foraminal stenosis  L1-2: no spinal canal or neural foraminal stenosis  L2-3: disc bulge combined with facet arthropathy results in mild spinal canal narrowing. Mild neural foraminal narrowing. Mild facet arthropathy  L3-4: disc degeneration. Shallow left subarticular disc protrusion results in mild left lateral recess narrowing. Encroachment upon the descending L4 nerve. Mild neural foraminal narrowing. Mild facet arthropathy.  L4-5: disc degeneration. Suggestion of prior left hemilaminectomy. Left subarticular disc protrusion results in moderate left lateral recess narrowing encroaches upon the descending left L5 nerves. Mild neural foraminal narrowing.  L5-S1: disc degeneration with opposing endplate marrow edema (Modic type 1). Prior  left hemilaminectomy. Central and left subarticular disc protrusion abuts the left-greater-than-right descending S1 nerves. Mild neural foraminal narrowing. Mild facet arthropathy.    Mild sacroiliac joint osteoarthritis    Impression:  At L3-4, left subarticular disc protrusion encroaches upon the descending left L4 nerves.  At L4-5, left subarticular disc protrusion results in moderate lateral recess narrowing and encroaches upon the descending left L5 nerves.  At L5-S1, central and left subarticular disc protrusion abuts the left-greater-than-right descending S1 nerves.    Dictated by Santhosh Barone MD 3/28/25 3:51:41 pm          This note was dictated using voice recognition software. Any grammatical or context distortions are unintentional and inherent to the software.       Vilma ROJASP-C  [facility]  O. [phone]             Again, thank you for allowing me to participate in the care of your patient.      Sincerely,    Vilma Vázquez, FINA CNP

## 2025-04-21 ENCOUNTER — TELEPHONE (OUTPATIENT)
Dept: PALLIATIVE MEDICINE | Facility: CLINIC | Age: 55
End: 2025-04-21
Payer: COMMERCIAL

## 2025-04-21 DIAGNOSIS — M54.16 LUMBAR RADICULOPATHY: Primary | ICD-10-CM

## 2025-04-21 NOTE — TELEPHONE ENCOUNTER
"Screening Questions for Radiology Injections:    Injection to be done at which interventional clinic site? [facility]    If choosing [facility] for location, please inform patient:  \"[facility] is a Hospital based clinic. Before your visit, you should check with your insurance about how it covers the charges for facility services in a hospital-based clinic.     Procedure ordered by Gurpreet    Procedure ordered? L5-S1 IL JEREMY   Transforaminal Cervical JEREMY - Send to [facility] ([facility]) - No [facility] providers perform this procedure    What insurance would patient like us to bill for this procedure? BC Federal  IF SCHEDULING IN [place] PAIN OR SPINE PLEASE SCHEDULE AT LEAST 7-10 BUSINESS DAYS OUT SO A PA CAN BE OBTAINED  Worker's comp or MVA (motor vehicle accident) -Any injection DO NOT SCHEDULE and route to Graham Murcia.    [employer] insurance - For ALL INJECTIONS DO NOT SCHEDULE and route to Arlen Benitez.     ALL BCBS, Hum[employer] and [employer] CIGNA - DO NOT SCHEDULE and route to Arlen Benitez  MEDICA- ALL INJECTIONS- route to Arlen Benitez    Is patient scheduled at [facility]? no   If YES, route every encounter to [facility]INE [facility] CARE NAVIGATION POOL [[phone]]    Is an  needed? No     Patient has a  home? (Review Grid) YES: ok    Any chance of pregnancy? NO   If YES, do NOT schedule and route to RN pool  - Dr. Olivo route to PM&R Nurse  [[id number]]      Is patient actively being treated for cancer or immunocompromised? No  If YES, do NOT schedule and route to RN pool/ Dr. Olivo's Team    Does the patient have a bleeding or clotting disorder? No   If YES, okay to schedule AND route to RN nurse / Dr. Olivo's Team   (For any patients with platelet count <100, RN must forward to provider)    Is patient taking any Blood Thinners OR Antiplatelet medication?  No   If hold needed, do NOT schedule, route to RN pool/ Dr. Olivo's Team  Examples:   Blood Thinners: " (Coumadin, Warfarin, Jantoven, Pradaxa, Xarelto, Eliquis, Edoxaban, Enoxaparin, Lovenox, Heparin, Arixtra, Fondaparinux or Fragmin)  Antiplatelet Medications: (Plavix, Brilinta or Effient)     Is patient taking any aspirin products (includes Excedrin and Fiorinal)? No.    If yes route to RN pool/ Dr. Olivo's Team - Do not schedule    Is patient taking any GLP-1 Antagonist (hold needed for sedation patients only) No   (semaglutide (Ozempic, Wegovy), dulaglutide (Trulicity), exenatide ER (Bydureon), tirzepatide (Mounjaro), Liraglutide (Saxenda, Victoza), semaglutide (Rybelsus), Terzepatide (Zepbound)  If YES, okay to schedule AND route to RN nurse / Dr. Olivo's Team      Any allergies to contrast dye, iodine, shellfish, or numbing and steroid medications? No  If YES, schedule and add allergy information to appointment notes AND route to the RN pool/ Dr. Olivo's Team  If JEREMY and Contrast Dye / Iodine Allergy? DO NOT SCHEDULE, route to RN pool/ Dr. Olivo's Team  Allergies: Oxycodone and Zithromax [azithromycin]     Does patient have an active infection or treated for one within the past week? No  Is patient currently taking any antibiotics or steroid medications?  No   For patients on chronic, preventative, or prophylactic antibiotics, procedures may be scheduled.   For patients on antibiotics for active or recent infection, schedule 4 days after completed.  For patients on steroid medications, schedule 4 days after completed.     Has the patient had a flu shot or any other vaccinations within the past 7 days? No  If yes, explain that for the vaccine to work best they need to:     wait 1 week before and 1 week after getting any Vaccine  wait 1 week before and 2 weeks after getting any Covid Vaccine   If patient has concerns about the timing, send to RN pool/ Dr. Olivo's Team    Does patient have an MRI/CT?  YES: mri Include Date and Check Procedure Scheduling Grid to see if required.  Was the MRI/CT done within the  last 3 years?  Yes   If no route to RN Pool/ Dr. Olivo's Team  If yes, where was the MRI/CT done? [facility]   Refer to PACS Transmissions list for approved external locations and route to RN Pool High Priority/ Dr. Swains Team  If MRI was not done at approved external location do NOT schedule and route to RN pool/ Dr. Olivo's Team    If patient has an imaging disc, the injection MAY be scheduled but patient must bring disc to appt or appt will be cancelled.    Is patient able to transfer to a procedure table with minimal or no assistance? Yes   If no, do NOT schedule and route to RN Pool/ Dr. Olivo's Team    Procedure Specific Instructions:  If celiac plexus block, informed patient NPO for 6 hours and that it is okay to take medications with sips of water, especially blood pressure medications Not Applicable       If this is for a cervical procedure, informed patient that aspirin needs to be held for 6 days.   Not Applicable    Sedation, If Sedation is ordered for any procedure, patient must be NPO for 6 hours prior to procedure Not Applicable    If IV needed:  Do not schedule procedures requiring IV placement in the first appointment of the day or first appointment after lunch. Do NOT schedule at 0745, 0815 or 1245.   Instructed patient to arrive 30 minutes early for IV start if required. (Check Procedure Scheduling Grid)  Not Applicable    Reminders:  If you are started on any steroids or antibiotics between now and your appointment, you must contact us because the procedure may need to be cancelled.  Yes    As a reminder, receiving steroids can decrease your body's ability to fight infection.   Would you still like to move forward with scheduling the injection?  Yes    IV Sedation is not provided for procedures. If oral anti-anxiety medication is needed, the patient should request this from their referring provider.    Instruct patient to arrive as directed prior to the scheduled appointment  time:  If IV needed 30 minutes before appointment time     For patients 85 or older we recommend having an adult stay w/ them for the remainder of the day.     If the patient is Diabetic, remind them to bring their glucometer.    Dr. Rothman Pt's - Imaging Orders Needed   Please send all injections to RN Pool NO   Red Flags? NO    Does the patient have any questions?  NO  Dori Murcia  [facility]

## 2025-04-22 NOTE — TELEPHONE ENCOUNTER
Called [facility] medical records/radiology dept. Requested images to be pushed to PACS    Michelle VELASCO, RN Care Coordinator  [facility]  [facility]

## 2025-05-29 ENCOUNTER — OFFICE VISIT (OUTPATIENT)
Dept: PHYSICAL MEDICINE AND REHAB | Facility: CLINIC | Age: 55
End: 2025-05-29
Payer: COMMERCIAL

## 2025-05-29 VITALS — SYSTOLIC BLOOD PRESSURE: 164 MMHG | DIASTOLIC BLOOD PRESSURE: 96 MMHG | HEART RATE: 85 BPM

## 2025-05-29 DIAGNOSIS — M54.16 LUMBAR RADICULOPATHY: Primary | ICD-10-CM

## 2025-05-29 PROCEDURE — 3080F DIAST BP >= 90 MM HG: CPT | Performed by: NURSE PRACTITIONER

## 2025-05-29 PROCEDURE — 3077F SYST BP >= 140 MM HG: CPT | Performed by: NURSE PRACTITIONER

## 2025-05-29 PROCEDURE — 1125F AMNT PAIN NOTED PAIN PRSNT: CPT | Performed by: NURSE PRACTITIONER

## 2025-05-29 PROCEDURE — 99215 OFFICE O/P EST HI 40 MIN: CPT | Performed by: NURSE PRACTITIONER

## 2025-05-29 RX ORDER — LORAZEPAM 1 MG/1
TABLET ORAL
Qty: 2 TABLET | Refills: 0 | Status: SHIPPED | OUTPATIENT
Start: 2025-05-29

## 2025-05-29 RX ORDER — GABAPENTIN 300 MG/1
CAPSULE ORAL
Qty: 180 CAPSULE | Refills: 1 | Status: SHIPPED | OUTPATIENT
Start: 2025-05-29

## 2025-05-29 RX ORDER — MELOXICAM 7.5 MG/1
7.5-15 TABLET ORAL DAILY
Qty: 60 TABLET | Refills: 0 | Status: SHIPPED | OUTPATIENT
Start: 2025-05-29

## 2025-05-29 ASSESSMENT — PAIN SCALES - GENERAL: PAINLEVEL_OUTOF10: SEVERE PAIN (8)

## 2025-05-29 NOTE — LETTER
5/29/2025      Kacy Herrera  [address]  [place] [address]      Dear Colleague,    Thank you for referring your patient, Kacy Herrera, to the [facility]. Please see a copy of my visit note below.    ASSESSMENT: Kacy Herrera is a 55 year old female who presents for consultation at the request of PCP Radha Ewing, who presents today for follow up patient evaluation of:    -lumbar radiculopathy    Weakness R EHL with sensory loss R outer foot on exam today. Recommend updated lumbar MRI given weakness and consider neurosurgery referral depending on results. Positive R straight leg raise and positive R gaenslen and thigh thrust but neg compression, MARCELLE. SI unlikely to cause pain into the foot with numbness, although we did discuss role of SI inj however only 2 maneuvers positive today. Restart gabapentin with fast increase. If recheck BP improved and diclofenac added.           No data to display                     Diagnoses and all orders for this visit:  Lumbar radiculopathy  -     MR Lumbar Spine w/o Contrast; Future  -     gabapentin (NEURONTIN) 300 MG capsule; Take 300mg po at bedtime day one, 300mg twice daily on day 2, 300mg three times daily on day 3. Increase gradually adding one capsule to regimen daily using titration schedule until up to 900mg three times daily.  -     meloxicam (MOBIC) 7.5 MG tablet; Take 1-2 tablets (7.5-15 mg) by mouth daily.  -     LORazepam (ATIVAN) 1 MG tablet; Take 1 tablet 1hr prior to MRI. Take 2nd tablet 20 mins prior to MRI if needed. Do not drive.        PLAN:  Reviewed spine anatomy and disease process. Discussed diagnosis and treatment options with the patient today. A shared decision making model was used.  The patient's values and choices were respected. The following represents what was discussed and decided upon by the provider and the patient.      -DIAGNOSTIC TESTS:  Images were personally reviewed and  interpreted and explained to patient today using a spine model.   -MRI lumbar spine orders placed    -PHYSICAL THERAPY:    -Patient in too much pain to participate in PT at this time  Discussed the importance of core strengthening, ROM, stretching exercises with the patient and how each of these entities is important in decreasing pain.  Explained to the patient that the purpose of physical therapy is to teach the patient a home exercise program.  These exercises need to be performed every day in order to decrease pain and prevent future occurrences of pain.        -MEDICATIONS:    --start diclofenac  -restart gabapentin and increase per titration schedule  Discussed multiple medication options today with patient. Discussed risks, side effects, and proper use of medications. Patient verbalized understanding.    -INTERVENTIONS:    -Discussed the role of injections with patient today. Patient would be a good candidate in the future for either epidural steroid injections or medial branch blocks if indicated based on symptoms and supported by imaging results.  -Risks, benefits, and role of injections were discussed with patient today. Orders placed for IL JEREMY L5-S1    -PATIENT EDUCATION:  Total time of 40 minutes, on the day of service, spent with the patient, reviewing the chart, placing orders, and documenting.   -Today we also discussed the pros and cons of the current treatment plan.    -FOLLOW-UP:  we will call with imaging results    Advised patient to call the [facility] if symptoms worsen, new numbness or weakness develops in the legs, or if they develop new or worsening problems controlling bladder or bowel function.   ______________________________________________________________________    SUBJECTIVE:    Kacy is here for a follow up visit after caudal JEREMY inj on 4/25. Reports 90% relief but only lasted 1 wk.  Gabapentin and diclofenac 75mg not helpful. She stopped taking them. Pain today 8/10 today, 9  at worst, 5 at worst. Worse with moving, bending.   The weakness in her foot is back.   She has numbness of the outside of the foot   Has been taking tylenol        Per original visit with updated meds/inj list:    HPI:  Kacy Herrera  Is a 55 year old female Left L5-S1 and L4-5 hemilaminotomy at [place] [facility] with hx anxiety and depression who presents today for new patient evaluation of right lumbar radiculopathy    March 8th sx started spontaneously when putting her shoes on.  Severe R gluteal pain into the entire posterior leg. Heel is numb.  Can't sit, stand, or lie down. Notes nausea.  During PT postop knee replacment had L leg sciatica but not nearly as bad as this  Then had a left L5-S1 and L4-5 hemilami    1 mo now of no relief. Was placed on prednisone x 5 days and no improvement,  flexeril as well which she is mostly taking at night. She is not sleeping a lot. Had mri and then referred for an injection.   underwent RIGHT S1 TRANSFORAMINAL LUMBOSACRAL rayus 1 wk ago.    She has tried aleve, tylenol, advil  Ice     No PT so far    She was on gabapentin at one point for her knee in the past (not for the back) but didn't seem helpful at the time      -Treatment to Date:     -Medications:  Tylenol  Ibuprofen  Aleve  Diclofenac 75mg  Flexeril  Prednisone  Gabapentin    Injections:  Right S1 TF JEREMY rayus 4/2/2025, 0% relief  Caudal injection on 4/25/25, 90% relief.    Current Outpatient Medications   Medication Sig Dispense Refill     gabapentin (NEURONTIN) 300 MG capsule Take 300mg po at bedtime day one, 300mg twice daily on day 2, 300mg three times daily on day 3. Increase gradually adding one capsule to regimen daily using titration schedule until up to 900mg three times daily. 180 capsule 1     LORazepam (ATIVAN) 1 MG tablet Take 1 tablet 1hr prior to MRI. Take 2nd tablet 20 mins prior to MRI if needed. Do not drive. 2 tablet 0     meloxicam (MOBIC) 7.5 MG tablet Take 1-2 tablets (7.5-15 mg) by mouth daily. 60  tablet 0     acetaminophen (TYLENOL) 325 MG tablet Take 2 tablets (650 mg) by mouth every 4 hours as needed for other (mild pain) 100 tablet 0     diclofenac (VOLTAREN) 75 MG EC tablet Take 1 tablet (75 mg) by mouth 2 times daily as needed for moderate pain. 60 tablet 0     gabapentin (NEURONTIN) 300 MG capsule 300mg po at bedtime x 3 days, then twice daily x 3 days, then three times daily ongoing (Patient not taking: Reported on 5/29/2025) 90 capsule 1     HYDROmorphone (DILAUDID) 2 MG tablet Take 1-2 tablets (2-4 mg) by mouth every 3 hours as needed for moderate to severe pain (Patient not taking: Reported on 5/29/2025) 60 tablet 0     hydrOXYzine (ATARAX) 25 MG tablet Take 1 tablet (25 mg) by mouth every 6 hours as needed for itching or anxiety (with pain, moderate pain) (Patient not taking: Reported on 5/29/2025) 30 tablet 0     levonorgestrel (MIRENA) 52 MG (20 mcg/day) IUD 1 each by Intrauterine route.       naproxen sodium (ANAPROX) 220 MG tablet Take 220 mg by mouth 2 times daily (with meals)       senna-docusate (SENOKOT-S/PERICOLACE) 8.6-50 MG tablet Take 1-2 tablets by mouth 2 times daily Take while on oral narcotics to prevent or treat constipation. (Patient not taking: Reported on 5/29/2025) 30 tablet 0     venlafaxine (EFFEXOR-XR) 150 MG 24 hr capsule Take 150 mg by mouth daily        No current facility-administered medications for this visit.       Allergies   Allergen Reactions     Oxycodone Itching     Zithromax [Azithromycin] Hives       Past Medical History:   Diagnosis Date     Arthritis     knee left     Cholelithiasis      Depressive disorder      Other chronic pain     left knee        Patient Active Problem List   Diagnosis     Pain in joint, ankle and foot     Biliary colic     S/P revision of total knee, left       Past Surgical History:   Procedure Laterality Date     ARTHROPLASTY REVISION KNEE Left 1/4/2022    Procedure: Left revision left total knee arthroplasty using a Ashwini LCCK knee  system;  Surgeon: Bradly Treviño MD;  Location: [facility] OR     ARTHROSCOPY KNEE WITH MENISCAL REPAIR Left       SECTION       FOOT SURGERY       JOINT REPLACEMENT Left 2016    uni compartmenta     JOINT REPLACEMENT (aka KNEE) NOS Left      LAPAROSCOPIC CHOLECYSTECTOMY N/A 2020    Procedure: CHOLECYSTECTOMY, LAPAROSCOPIC with cholangiograms;  Surgeon: Shirley Purcell MD;  Location:  OR     [facility] PLASTY KNEE,MED OR LAT COMPARTMT Left 2017    Procedure: CONVERT LEFT UNICOMPARTMENTAL ;  Surgeon: Josh Moseley MD;  Location: [facility] OR;  Service: Orthopedics     [facility] TOTAL KNEE ARTHROPLASTY Left 2017    Procedure: TO TOTAL KNEE ARTHROPLASTY;  Surgeon: Josh Moseley MD;  Location: [facility] OR;  Service: Orthopedics       No family history on file.    Reviewed past medical, surgical, and family history with patient found on new patient intake packet located in EMR Media tab.         OBJECTIVE:  BP (!) 164/96   Pulse 85     PHYSICAL EXAMINATION:    --CONSTITUTIONAL:  Vital signs as above.  No acute distress.  The patient is well nourished and well groomed. Appears uncomfortable dt pain  --PSYCHIATRIC:  Appropriate mood and affect. The patient is awake, alert, oriented to person, place, time and answering questions appropriately with clear speech.    --SKIN:  Skin over the face, bilateral lower extremities, and posterior torso is clean, dry, intact without rashes.    --RESPIRATORY: Normal rhythm and effort. No abnormal accessory muscle breathing patterns noted.   --ABDOMINAL:  Non-distended.    --GROSS MOTOR: Gait is non-antalgic. Arises w pain from a seated position. Toe walking and heel walking are normal without significant difficulty.      --LOWER EXTREMITY MOTOR TESTING:  Hip flexion: right 5/5, left 5/5  Hip abduction: right 5/5, left 5/5  Hip adduction: right 5/5, left 5/5   Quads: right 5/5, left 5/5  Hamstrings: right 5/5, left 5/5  Dorsiflexion: right 5/5, left  5/5  Plantar flexion: right 5/5, left 5/5    Great toe MTP extension/EHL: right 4/5, left 5/5    --NEUROLOGICAL:  2/4 patellar and achilles reflexes bilaterally. Sensation to light touch is intact throughout both lower extremities. Babinski is negative. No clonus.    --MUSCULOSKELETAL: Lumbar spine inspection reveals no evidence of deformity. No point tenderness to palpation lumbar spine. Painful lumbar extension.  Some R lower paraspinal musculature tenderness    Straight leg raising is positive on the R.    --HIPS: Full range of motion bilaterally. Negative MARCELLE and Negative FADIR bilaterally. Negative hip joint tenderness to palp.      --SACROILIAC JOINT: Gaenslen's Test was positive on R. Thigh thrust was positive on R.  One finger point test was negative. right SI joint tenderness to palpation. Negative compression on R    --VASCULAR:  Bilateral lower extremities are warm without any discoloration.  There is no pitting edema of the bilateral lower extremities.    RESULTS:   Prior medical records from [facility] and [facility]where were reviewed today.    Imaging: report findings were explained using a spine model. There were no images available at time of appt    MRI lumbar spine without contrast  Comparison: none relevant available    Findings:  There are 5 lumbar type vertebral segments identified. The vertebral body heights are maintained without evidence of fracture. There is no discrete T1 hypointense marrow infiltrating process.     The conus medullaris terminates at L1, normal. Cauda equina appears unremarkable.  T12-L1: no spinal canal or neural foraminal stenosis  L1-2: no spinal canal or neural foraminal stenosis  L2-3: disc bulge combined with facet arthropathy results in mild spinal canal narrowing. Mild neural foraminal narrowing. Mild facet arthropathy  L3-4: disc degeneration. Shallow left subarticular disc protrusion results in mild left lateral recess narrowing. Encroachment upon the  descending L4 nerve. Mild neural foraminal narrowing. Mild facet arthropathy.  L4-5: disc degeneration. Suggestion of prior left hemilaminectomy. Left subarticular disc protrusion results in moderate left lateral recess narrowing encroaches upon the descending left L5 nerves. Mild neural foraminal narrowing.  L5-S1: disc degeneration with opposing endplate marrow edema (Modic type 1). Prior left hemilaminectomy. Central and left subarticular disc protrusion abuts the left-greater-than-right descending S1 nerves. Mild neural foraminal narrowing. Mild facet arthropathy.    Mild sacroiliac joint osteoarthritis    Impression:  At L3-4, left subarticular disc protrusion encroaches upon the descending left L4 nerves.  At L4-5, left subarticular disc protrusion results in moderate lateral recess narrowing and encroaches upon the descending left L5 nerves.  At L5-S1, central and left subarticular disc protrusion abuts the left-greater-than-right descending S1 nerves.    Dictated by Santhosh Barone MD 3/28/25 3:51:41 pm          This note was dictated using voice recognition software. Any grammatical or context distortions are unintentional and inherent to the software.       Vilma Vázquez FNP-C  [facility]  O. [phone]             Again, thank you for allowing me to participate in the care of your patient.        Sincerely,        FINA Webb CNP    Electronically signed

## 2025-05-29 NOTE — PROGRESS NOTES
ASSESSMENT: Kacy Herrera is a 55 year old female who presents for consultation at the request of PCP Radha Ewing, who presents today for new patient evaluation of:    -lumbar radiculopathy    Weakness R EHL on exam today   Sensory loss R outer foot  Pos R straight leg  Pos R gaenslen and thigh thrust but neg compression, MARCELLE  SI unlikely to cause pain into the foot with numbness although we did discuss role of SI inj however only 2 maneuvers positive today   Recommend updated lumbar MRI given weakness and consider neurosurgery referral depending on results  Restart gabapentin with fast increase   If recheck BP is wnl we can add meloxicam - it did come down.                No data to display                     There are no diagnoses linked to this encounter.      PLAN:  Reviewed spine anatomy and disease process. Discussed diagnosis and treatment options with the patient today. A shared decision making model was used.  The patient's values and choices were respected. The following represents what was discussed and decided upon by the provider and the patient.      -DIAGNOSTIC TESTS:  Images were personally reviewed and interpreted and explained to patient today using a spine model.   -did not add any additional tests today    -PHYSICAL THERAPY:    -Patient in too much pain to participate in PT at this time  Discussed the importance of core strengthening, ROM, stretching exercises with the patient and how each of these entities is important in decreasing pain.  Explained to the patient that the purpose of physical therapy is to teach the patient a home exercise program.  These exercises need to be performed every day in order to decrease pain and prevent future occurrences of pain.        -MEDICATIONS:    --start diclofenac  -start gabapentin and increase per titration schedule  Discussed multiple medication options today with patient. Discussed risks, side effects, and proper use of medications. Patient  verbalized understanding.    -INTERVENTIONS:    -Discussed the role of injections with patient today. Patient would be a good candidate in the future for either epidural steroid injections or medial branch blocks if indicated based on symptoms and supported by imaging results.  -Risks, benefits, and role of injections were discussed with patient today. Orders placed for IL JEREMY L5-S1    -PATIENT EDUCATION:  Total time of 40 minutes, on the day of service, spent with the patient, reviewing the chart, placing orders, and documenting.   -Today we also discussed the pros and cons of the current treatment plan.    -FOLLOW-UP:   we will call with imaging results    Advised patient to call the [facility] if symptoms worsen, new numbness or weakness develops in the legs, or if they develop new or worsening problems controlling bladder or bowel function.   ______________________________________________________________________    SUBJECTIVE:    Kacy is here for a follow up visit after caudal JEREMY inj on 4/25. Reports 90% relief but only lasted 1 wk.  Gabapentin and dicleofenac 75 not helpful. She stopped taking them.  The weakness in her foot is back.   She has numbness of the outside of the foot         Per original visit with updated meds/inj list:    HPI:  Kacy Herrera  Is a 55 year old female Left L5-S1 and L4-5 hemilaminotomy at [place] with hx anxiety and depression who presents today for new patient evaluation of right lumbar radiculopathy    March 8th sx started spontaneously when putting her shoes on.  Severe R gluteal pain into the entire posterior leg. Heel is numb.  Can't sit, stand, or lie down. Notes nausea.  During PT postop knee replacment had L leg sciatica but not nearly as bad as this  Then had a left L5-S1 and L4-5 hemilami    1 mo now of no relief. Was placed on prednisone x 5 days and no improvement,  flexeril as well which she is mostly taking at night. She is not sleeping a lot. Had mri and  then referred for an injection.   underwent RIGHT S1 TRANSFORAMINAL LUMBOSACRAL rayus 1 wk ago.    She has tried aleve, tylenol, advil  Ice     No PT so far    She was on gabapentin at one point for her knee in the past (not for the back) but didn't seem helpful at the time      -Treatment to Date:     -Medications:  Tylenol  Ibuprofen  Aleve  Diclofenac 75mg  Flexeril  Prednisone  Gabapentin    Injections:  Right S1 TF JEREMY rayus 4/2/2025, 0% relief  Caudal injection on 4/25/25, 90% relief.    Current Outpatient Medications   Medication Sig Dispense Refill    acetaminophen (TYLENOL) 325 MG tablet Take 2 tablets (650 mg) by mouth every 4 hours as needed for other (mild pain) 100 tablet 0    diclofenac (VOLTAREN) 75 MG EC tablet Take 1 tablet (75 mg) by mouth 2 times daily as needed for moderate pain. 60 tablet 0    gabapentin (NEURONTIN) 300 MG capsule 300mg po at bedtime x 3 days, then twice daily x 3 days, then three times daily ongoing 90 capsule 1    HYDROmorphone (DILAUDID) 2 MG tablet Take 1-2 tablets (2-4 mg) by mouth every 3 hours as needed for moderate to severe pain (Patient not taking: Reported on 4/11/2025) 60 tablet 0    hydrOXYzine (ATARAX) 25 MG tablet Take 1 tablet (25 mg) by mouth every 6 hours as needed for itching or anxiety (with pain, moderate pain) (Patient not taking: Reported on 4/11/2025) 30 tablet 0    levonorgestrel (MIRENA) 52 MG (20 mcg/day) IUD 1 each by Intrauterine route.      naproxen sodium (ANAPROX) 220 MG tablet Take 220 mg by mouth 2 times daily (with meals)      senna-docusate (SENOKOT-S/PERICOLACE) 8.6-50 MG tablet Take 1-2 tablets by mouth 2 times daily Take while on oral narcotics to prevent or treat constipation. (Patient not taking: Reported on 4/11/2025) 30 tablet 0    venlafaxine (EFFEXOR-XR) 150 MG 24 hr capsule Take 150 mg by mouth daily        No current facility-administered medications for this visit.       Allergies   Allergen Reactions    Oxycodone Itching     Zithromax [Azithromycin] Hives       Past Medical History:   Diagnosis Date    Arthritis     knee left    Cholelithiasis     Depressive disorder     Other chronic pain     left knee        Patient Active Problem List   Diagnosis    Pain in joint, ankle and foot    Biliary colic    S/P revision of total knee, left       Past Surgical History:   Procedure Laterality Date    ARTHROPLASTY REVISION KNEE Left 2022    Procedure: Left revision left total knee arthroplasty using a Ashwini LCCK knee system;  Surgeon: Bradly Treviño MD;  Location: [facility] OR    ARTHROSCOPY KNEE WITH MENISCAL REPAIR Left      SECTION      FOOT SURGERY      JOINT REPLACEMENT Left 2016    uni compartmenta    JOINT REPLACEMENT (aka KNEE) NOS Left     LAPAROSCOPIC CHOLECYSTECTOMY N/A 2020    Procedure: CHOLECYSTECTOMY, LAPAROSCOPIC with cholangiograms;  Surgeon: Shirley Purcell MD;  Location:  OR    C PLASTY KNEE,MED OR LAT COMPARTMT Left 2017    Procedure: CONVERT LEFT UNICOMPARTMENTAL ;  Surgeon: Josh Moseley MD;  Location: [facility];  Service: Orthopedics    RUST TOTAL KNEE ARTHROPLASTY Left 2017    Procedure: TO TOTAL KNEE ARTHROPLASTY;  Surgeon: Josh Moseley MD;  Location: [facility];  Service: Orthopedics       No family history on file.    Reviewed past medical, surgical, and family history with patient found on new patient intake packet located in EMR Media tab.         OBJECTIVE:  There were no vitals taken for this visit.    PHYSICAL EXAMINATION:    --CONSTITUTIONAL:  Vital signs as above.  No acute distress.  The patient is well nourished and well groomed. Appears uncomfortable dt pain  --PSYCHIATRIC:  Appropriate mood and affect. The patient is awake, alert, oriented to person, place, time and answering questions appropriately with clear speech.    --SKIN:  Skin over the face, bilateral lower extremities, and posterior torso is clean, dry, intact without rashes.     --RESPIRATORY: Normal rhythm and effort. No abnormal accessory muscle breathing patterns noted.   --ABDOMINAL:  Non-distended.    --GROSS MOTOR: Gait is non-antalgic. Arises w pain from a seated position. Toe walking and heel walking are normal without significant difficulty.      --LOWER EXTREMITY MOTOR TESTING:  Hip flexion: right 5/5, left 5/5  Hip abduction: right 5/5, left 5/5  Hip adduction: right 5/5, left 5/5   Quads: right 5/5, left 5/5  Hamstrings: right 5/5, left 5/5  Dorsiflexion: right 5/5, left 5/5  Plantar flexion: right 5/5, left 5/5    Great toe MTP extension/EHL: right 4/5, left 5/5    --NEUROLOGICAL:  2/4 patellar and achilles reflexes bilaterally. Sensation to light touch is intact throughout both lower extremities. Babinski is negative. No clonus.    --MUSCULOSKELETAL: Lumbar spine inspection reveals no evidence of deformity. No point tenderness to palpation lumbar spine. Painful lumbar extension.  Some R lower paraspinal musculature tenderness    Straight leg raising is positive on the R.    --HIPS: Full range of motion bilaterally. Negative MARCELLE and Negative FADIR bilaterally. Negative hip joint tenderness to palp.    --SACROILIAC JOINT: Gaenslen's Test was negative. Thigh thrust was negative. One finger point test was negative. right SI joint tenderness to palpation     --VASCULAR:  Bilateral lower extremities are warm without any discoloration.  There is no pitting edema of the bilateral lower extremities.    RESULTS:   Prior medical records from [facility] were reviewed today.    Imaging: report findings were explained using a spine model. There were no images available at time of appt    MRI lumbar spine without contrast  Comparison: none relevant available    Findings:  There are 5 lumbar type vertebral segments identified. The vertebral body heights are maintained without evidence of fracture. There is no discrete T1 hypointense marrow infiltrating process.      The conus medullaris terminates at L1, normal. Cauda equina appears unremarkable.  T12-L1: no spinal canal or neural foraminal stenosis  L1-2: no spinal canal or neural foraminal stenosis  L2-3: disc bulge combined with facet arthropathy results in mild spinal canal narrowing. Mild neural foraminal narrowing. Mild facet arthropathy  L3-4: disc degeneration. Shallow left subarticular disc protrusion results in mild left lateral recess narrowing. Encroachment upon the descending L4 nerve. Mild neural foraminal narrowing. Mild facet arthropathy.  L4-5: disc degeneration. Suggestion of prior left hemilaminectomy. Left subarticular disc protrusion results in moderate left lateral recess narrowing encroaches upon the descending left L5 nerves. Mild neural foraminal narrowing.  L5-S1: disc degeneration with opposing endplate marrow edema (Modic type 1). Prior left hemilaminectomy. Central and left subarticular disc protrusion abuts the left-greater-than-right descending S1 nerves. Mild neural foraminal narrowing. Mild facet arthropathy.    Mild sacroiliac joint osteoarthritis    Impression:  At L3-4, left subarticular disc protrusion encroaches upon the descending left L4 nerves.  At L4-5, left subarticular disc protrusion results in moderate lateral recess narrowing and encroaches upon the descending left L5 nerves.  At L5-S1, central and left subarticular disc protrusion abuts the left-greater-than-right descending S1 nerves.    Dictated by Santhosh Barone MD 3/28/25 3:51:41 pm          This note was dictated using voice recognition software. Any grammatical or context distortions are unintentional and inherent to the software.       Vilma Vázquez FNP-C  [facility]  O. [phone]            descending left L5 nerves. Mild neural foraminal narrowing.  L5-S1: disc degeneration with opposing endplate marrow edema (Modic type 1). Prior left hemilaminectomy. Central and left subarticular disc protrusion abuts the left-greater-than-right descending S1 nerves. Mild neural foraminal narrowing. Mild facet arthropathy.    Mild sacroiliac joint osteoarthritis    Impression:  At L3-4, left subarticular disc protrusion encroaches upon the descending left L4 nerves.  At L4-5, left subarticular disc protrusion results in moderate lateral recess narrowing and encroaches upon the descending left L5 nerves.  At L5-S1, central and left subarticular disc protrusion abuts the left-greater-than-right descending S1 nerves.    Dictated by Santhosh Barone MD 3/28/25 3:51:41 pm          This note was dictated using voice recognition software. Any grammatical or context distortions are unintentional and inherent to the software.       Vilma Vázquez FNP-C  [facility]  O. [phone]

## 2025-05-29 NOTE — PATIENT INSTRUCTIONS
Gabapentin Dosing Chart    You have been prescribed gabapentin for your pain. Please follow the dosing chart below to start this medication properly.    DATE  MORNING AFTERNOON BEDTIME    Today 0 0 1     1 0 1     1 1 1     1 1 2     2 1 2     2 2 2     2 2 3     3 2 3     3 3 3        Prescribed meloxicam today. Please take 1-2 tabs daily as prescribed, as needed for pain control as well as to aid in decreasing inflammation.   Take medication with a full glass of water and with food.   *Do not take Advil, Ibuprofen, Aleve, or Naproxen while taking this medication as it can cause organ failure if taken together*  This medication does have risks if taken long-term, these risks include: gastrointestinal irritation, kidney dysfunction, and cardiovascular effects.  We will check your kidney function as needed while you're on this medication.  Stop taking this medication if you have intolerable side effects or blood in the stool.     Imaging (Xray, CT, or MRI) has been ordered today.   Radiology will call you to schedule. Please call below if you do not hear from them in the next couple of days.     [facility] Radiology Scheduling:  Please call [phone] to schedule your image(s) (select option #1).    There are 3 different locations you may go as a walk-in to have same-day Xrays done:    [facility]  [address] [place] [address] Imaging - [place]  [address], [address]   [place] [id number]    [place] [address] [place] [address]       Radicular Pain    Radicular pain in either the arm or leg is usually from a bulging disc in the spine. A portion of the herniated disc may press against the nerves as the nerves exit the spine. This causes pain which is felt down the arm or leg. Other causes of radicular pain may include:  Fractures.  Heart disease.  Cancer.  An abnormal and usually degenerative state of the nervous system or  "nerves (neuropathy).    In most cases, radicular pain is treated without imaging unless symptoms do not start to improve. If that is the case, your provider may order a CT or MRI scan to determine the cause.     Nerves in the cervical spine (neck) may cause radicular pain into the outer shoulder and down the arm. It can spread down to the thumb and fingers. The symptoms vary depending on which nerve root has been affected. In most cases, radicular pain improves with conservative treatment such as physical therapy, cervical traction, medications, and epidural steroid injections. A program for neck rehabilitation with stretching and strengthening exercises is an important part of management. Treatment may take months, and surgery may be considered as a last resort if the symptoms do not improve.    Nerves in the lumbar spine (lower back) may cause radicular pain into the hip and down the leg. The commonly used term for this type of pain is \"sciatica\". Conservative treatment is also recommended for this problem. Most patients feel better after 2 to 4 weeks of rest and other supportive care. You should avoid bending, lifting, and all other activities which can make your pain worse. Physical therapy, traction, medications, and epidural steroid injections can be good options to help with your recovery. A program for back injury rehabilitation with stretching and strengthening exercises is an important part of management. Surgery may be considered as a last resort if symptoms do not improve with conservative treatment.     You may take over-the-counter or prescription medicines for pain, discomfort, or fever as directed by your caregiver. Muscle relaxants may help by relieving more severe pain and spasm. Neuropathic medication (such as gabapentin, lyrica, or cymbalta) can help decrease your symptoms of nerve pain as well. Cold or massage can also give significant relief. Spinal manipulation is not recommended as it can " increase the degree of disc protrusion. We do not recommend taking narcotic medication such as percocet, oxycodone, norco, dilaudid, or others unless pain is severe and not controlled with any other oral options.    Epidural steroid injections are often effective treatment for radicular pain. These injections deliver strong anti-inflammatory medicine to the area directly around the nerve root in the space between your back bones (vertebrae). Your provider can give you more information about steroid injections. These injections are most effective when given within two weeks of the onset of acute pain. You should see your provider for follow up care as recommended.     In most cases, radicular pain is treated without imaging unless symptoms do not start to improve. If that is the case, your provider may order a CT or MRI scan to determine the cause.     SEEK IMMEDIATE MEDICAL CARE IF:  You develop increased pain, weakness, or numbness in your arm or leg.  You develop difficulty with bladder or bowel control.  You develop abdominal pain.    Document Released: 01/25/2006 Document Revised: 03/11/2013 Document Reviewed: 04/12/2010  Patient Information  2013 [employer].     ~Please call our [facility] Nurse Navigation line [phone] with any questions or concerns about your treatment plan, if symptoms worsen and you would like to be seen urgently, or if you have any new or worsening numbness, weakness, or problems controlling bladder and bowel function.  ~You are also welcome to contact Vilma Vázquez via [facility], but please be aware that responses to [facility] message may take 2-3 days due to the high volume of patients seen in clinic.

## 2025-06-03 ENCOUNTER — HOSPITAL ENCOUNTER (OUTPATIENT)
Dept: MRI IMAGING | Facility: CLINIC | Age: 55
Discharge: HOME | End: 2025-06-03
Attending: NURSE PRACTITIONER
Payer: COMMERCIAL

## 2025-06-03 DIAGNOSIS — M54.16 LUMBAR RADICULOPATHY: ICD-10-CM

## 2025-06-03 PROCEDURE — 72148 MRI LUMBAR SPINE W/O DYE: CPT

## 2025-06-06 ENCOUNTER — RESULTS FOLLOW-UP (OUTPATIENT)
Dept: PHYSICAL MEDICINE AND REHAB | Facility: CLINIC | Age: 55
End: 2025-06-06
Payer: COMMERCIAL

## 2025-06-06 DIAGNOSIS — M54.16 LUMBAR RADICULOPATHY: Primary | ICD-10-CM

## 2025-06-10 ENCOUNTER — PATIENT OUTREACH (OUTPATIENT)
Dept: CARE COORDINATION | Facility: CLINIC | Age: 55
End: 2025-06-10
Payer: COMMERCIAL

## 2025-06-10 ENCOUNTER — TELEPHONE (OUTPATIENT)
Dept: NEUROSURGERY | Facility: CLINIC | Age: 55
End: 2025-06-10
Payer: COMMERCIAL

## 2025-06-10 DIAGNOSIS — M54.16 LUMBAR RADICULOPATHY: Primary | ICD-10-CM

## 2025-06-10 NOTE — CONFIDENTIAL NOTE
NEUROSURGERY - NEW PREVISIT PLANNING    Referring Provider: Vilma Vázquez, FINA CNP    OVN 6/6/25   Reason For Visit: M54.16 (ICD-10-CM) - Lumbar radiculopathy        IMAGING STATUS/LOCATION DATE/TYPE   MRI PACS 06/03/2025  Lumbar  [facility]   CT N/A    XRAY     NOTES STATUS/LOCATION DATE/TYPE   Other specialist OVN: N/A    EMG N/A    INJECTION Encounters 04/25/2025, caudal JEREMY   PHYSICAL THERAPY N/A    SURGERY N/A      Does patient have C2C?  Year last updated Action     YES   [x]   2016   Please update at appointment if outdated more than 5 years       NO     []   N/A   Please complete C2C at appointment

## 2025-06-10 NOTE — TELEPHONE ENCOUNTER
Date: Tereza 10, 2025    Provider: Other     Provider/Other: Radiology Department    Reason for out-going call: ORDERS: CASE REQUEST/IMAGING/ INJECTION/ PROCEDURES       Detailed message: Left voicemail to schedule XR prior to appt tomorrow with Dr. Atkinson.          Number provider for patient: [facility]/ [facility]: [phone]

## 2025-06-10 NOTE — TELEPHONE ENCOUNTER
----- Message from Isabel PRUETT sent at 6/10/2025 11:14 AM CDT -----  Regarding: xr prior - appt tomorrow  Hello,    Patient added to 's schedule tomorrow, please schedule XR prior to appt. Marjan kelly today, thank you!    Isabel

## 2025-06-11 ENCOUNTER — HOSPITAL ENCOUNTER (OUTPATIENT)
Dept: RADIOLOGY | Facility: HOSPITAL | Age: 55
Discharge: HOME | End: 2025-06-11
Attending: STUDENT IN AN ORGANIZED HEALTH CARE EDUCATION/TRAINING PROGRAM
Payer: COMMERCIAL

## 2025-06-11 ENCOUNTER — OFFICE VISIT (OUTPATIENT)
Dept: NEUROSURGERY | Facility: CLINIC | Age: 55
End: 2025-06-11
Attending: NURSE PRACTITIONER
Payer: COMMERCIAL

## 2025-06-11 ENCOUNTER — PRE VISIT (OUTPATIENT)
Dept: NEUROSURGERY | Facility: CLINIC | Age: 55
End: 2025-06-11

## 2025-06-11 VITALS
SYSTOLIC BLOOD PRESSURE: 196 MMHG | HEIGHT: 62 IN | BODY MASS INDEX: 33.13 KG/M2 | WEIGHT: 180 LBS | HEART RATE: 88 BPM | DIASTOLIC BLOOD PRESSURE: 104 MMHG | OXYGEN SATURATION: 98 %

## 2025-06-11 DIAGNOSIS — M54.16 LUMBAR RADICULOPATHY: ICD-10-CM

## 2025-06-11 PROCEDURE — 72110 X-RAY EXAM L-2 SPINE 4/>VWS: CPT

## 2025-06-11 ASSESSMENT — PAIN SCALES - GENERAL: PAINLEVEL_OUTOF10: SEVERE PAIN (8)

## 2025-06-11 NOTE — PROGRESS NOTES
"Dear Ms. Vilma Gurpreet and Ms. Radha Ewing,     Thank you for the opportunity to participate in the care of Kacy Herrera.     As you know, she is a 55-year-old female who presents with concerns of acute onset right lower extremity radiculopathy.  This occurred towards the latter end of her vacation in [facility].  The patient bent over after \"being knocked over by a wave\" the day prior.  The patient has been treated with prednisone and muscle relaxers.  She has had 2 epidural steroid injections.  She had approximately 3 weeks of pain relief with \"97% improvement\" after her second injection.  She did initially report weakness of her right ankle, but this is improving since the initial onset of her pain symptoms.    She is retired. She used to work as a lunch lady. Her medical history is notable for the following: arthritis, cholelithiasis, depression. Her surgery history is notable for:   Past Surgical History:   Procedure Laterality Date    ARTHROPLASTY REVISION KNEE Left 2022    Procedure: Left revision left total knee arthroplasty using a Ashwini LCCK knee system;  Surgeon: Bradly Treviño MD;  Location: [facility] OR    ARTHROSCOPY KNEE WITH MENISCAL REPAIR Left      SECTION      FOOT SURGERY      JOINT REPLACEMENT Left 2016    uni compartmenta    JOINT REPLACEMENT (aka KNEE) NOS Left     LAPAROSCOPIC CHOLECYSTECTOMY N/A 2020    Procedure: CHOLECYSTECTOMY, LAPAROSCOPIC with cholangiograms;  Surgeon: Shirley Purcell MD;  Location:  OR    [facility] PLASTY KNEE,MED OR LAT COMPARTMT Left 2017    Procedure: CONVERT LEFT UNICOMPARTMENTAL ;  Surgeon: Josh Moseley MD;  Location: [facility];  Service: Orthopedics    [facility] TOTAL KNEE ARTHROPLASTY Left 2017    Procedure: TO TOTAL KNEE ARTHROPLASTY;  Surgeon: Josh Moseley MD;  Location: [facility];  Service: Orthopedics     Of note, the patient has undergone a left-sided L5-S1 microdiscectomy with Dr. Arnold.     On " "exam, she has 5/5 strength with bilateral hip flexion, knee flexion, and knee extension. She has the following deficits:   Extensor hallucis longus 3/5 on R, 4/5 on L  Dorsiflexion 4/5 on R, 5/5 on L    On my personal review, the patient has evidence of postsurgical changes of a hemilaminectomy at L5-S1 on the left side.  She has a new disc herniation emanating from a paracentral disc protrusion with rostral migration of the disc towards the right side. Her flexion/extension imaging demonstrates no obvious instability at L5-S1.           In assessment, Kacy likely has a right sided L5 radiculopathy given her disc herniation and associated weakness and has failed medical management.    We reviewed the available options including ongoing conservative management with physical therapy, medications, and repeat steroid injections.    The next possible option would be a right sided hemilaminectomy and microdiscectomy at L5-S1.  I would perform this as an open approach given her prior surgery on the left side.  I will resect any visible left sided disc if visible and accessible but will not attempt to perform a redo decompression of the left side given her lack of pain symptoms on the left side. I detailed the risks of surgery which include: nerve injury, CSF leak, failure to improve preoperative symptoms, recurrent disc herniation, infection, bleeding, and need for future spinal instrumentation.  After this discussion, the patient stated that she would like to proceed with scheduling, and will think about all the information provided today.  All questions were answered at the end of the discussion.    Ryan \"Florin\" MD [place]    of Neurosurgery, [facility]      "

## 2025-06-11 NOTE — PATIENT INSTRUCTIONS
Recommend Lumbar 5-Sacral 1 microdiscectomy.      Please review COMPLETE information about your proposed surgery, pre-operative requirements, post-operative course and expectations - available in a folder provided to you in clinic!    Your surgery scheduler will call you within 3 business days to begin the process of scheduling your surgery and appointments.     Pre-Operative    Pre-operative physical / Lab work with primary care physician within 30 days of surgical date. We prefer the pre-op exam to be done 2 weeks prior to surgery.    If all pre-op appointments/test are not completed prior to your surgery date, you will be asked to reschedule your surgery.           As part of preparation for your upcoming procedure your primary care doctor may order a test to rule out a COVID-19 infection. This is no longer a requirement prior to surgery.     Readiness Visits    Prior to surgery, you may have a telephone or in person readiness visit with our RN team to discuss your upcomming surgery, results of your pre-op physical, and lab work.   If you will require a collar/neck brace after surgery, you will be fitted for one at your readiness visit prior to surgery (scheduled by the surgery scheduler).     Shower procedure    Hibiclens shower: Use one packet the night before surgery and one packet the morning of surgery for a whole body shower. Avoid face, hair, and genitals.      Eating/Drinking    Stop all solid foods 8 hours before surgery.  Stop all clear liquids 2 hours prior to arrival time     Clear liquids include water, clear juice, black coffee, or clear tea without milk, Gatorade, clear soda.     Medications - please refer to the pre-operative medication instructions sheet in your folder    Hold Aspirin, NSAIDs (Advil/Ibuprofen, Indocin, Naproxen,Nuprin,Relafen/Nabumetone, Diclofenac,Meloxicam, Aleve, Celebrex) and all vitamins and supplements x 7-10 days prior to surgical date  You can take Tylenol (Acetaminophen)  for pain up until the date of your surgery   Do not exceed 3,000 mg per day   Any other medications prescribed, please discuss with your primary care provider at your pre-operative physical. Please discuss when/if it is safe for you to stop taking blood thinners with your primary care provider.   We will NOT provide pain medications prior to surgery. We will prescribe post-op pain medications for up to 6 weeks after surgery.       FMLA/Short-term disability    If you are currently employed, you will likely need to be off work for 4-6 weeks for post-op recovery and healing.  Please fax any FMLA/short term disability paperwork to [phone], mail it into the clinic, drop it off in person, or send via a Beisen message.   You may also call our clinic with the date in which you'd like to return to work, and we can provide a work letter to your employer  We will support Short-Term Disability up to 12 weeks, beginning the date of your surgery. We do NOT support Long-Term Disability/Social Security Benefits.     Pain Management after surgery    Dr. Atkinson will refill pain medications for up to 4 weeks after the date of surgery. If you still need pain medication at that point you will have to go through your primary care provider.    Dealing with pain    As your body heals, you might feel a stabbing, burning, or aching pain. You may also have some numbness.  Everyone feels pain differently, we may ask you to rate your pain using a pain scale. This will let us know how much pain you feel.   Keep in mind that medicine won't take away all of your pain. It helps to try other ways to relax and ease pain.     Things to help with pain    After surgery, we will give you medicine for your pain. These medications work well, but they can make you drowsy, itchy, or sick to your stomach, and constipated. Try to take narcotics with food if they cause nausea.   For mild to moderate pain, you can take medication such as Tylenol or Ibuprofen.  These can be used with narcotics and muscle relaxants. *If you have had a fusion: do NOT use NSAIDs for 6 months after surgery.   Do NOT drive while taking narcotic pain medication  Do NOT drink alcohol while using narcotic pain medication  You can utilize ice as needed (no longer than 20 minutes at one time) you may apply this over your covered incision  Utilize heat for muscle spasms, do not apply heat over your incision  If a muscle relaxer is prescribed, please do NOT take it at the same time as your narcotic pain medication. Take them at least 90 minutes apart.   You may also use pain cream/patches on sore muscles. Do NOT apply these on your incision. Patches may be cut in 1/2 if needed.     *After your surgery, if you will be staying in-patient, a nursing team will be monitoring you closely throughout your stay and communicate your health status to your surgeon and other providers.  You will be seen by Advanced Practice Providers (e.g., nurse practitioners, clinic nurse specialists, and physician assistants) who will check on you regularly to assess the status of your surgical recovery.     Incision Care    Look at your incision site every day. You  may need a mirror, or family member to help you.   Do not submerge your incision in water such as pools, hot tubs, baths for at least 6 weeks or until incision is healed  You may get your incision wet in the shower. Allow water and soap to run over incision, and gently pat dry.   Remove the dressing the day after you are discharged from the hospital. Keep the incision clean and dry at all times. This may require several bandage changes.   Contact us right away if you have:   Fever over 101 degrees farenheit  Green or yellow drainage (pus) from your incision or increased bloody drainage   Redness, swelling, or warmth at your surgery site   Notify the clinic [phone].    Activity Restrictions    For the first 6 weeks, no lifting,pushing, or pulling > 5-10 pounds,  no bending, twisting.  Use the stairs in moderation   Walking: Walking is the best way to start exercise after surgery. Take short frequent walks. You may gradually increase the distance as tolerated. If you feel pain, decrease your activity, but DO NOT stop walking. Walking will help you regain strength.  Avoid prolonged positioning for longer than 30 minutes (change positions frequently while awake)  No contact sports until after follow up visit  No high impact activities such as; running/jogging, snowmobile or 4 perry riding or any other recreational vehicles until deemed safe by your surgeon/care team.   Please call the clinic if you develop any of the following symptoms:  Swelling and/or warmth in one or both legs  Pain or tenderness in your leg, ankle, foot, or arm   Red or discolored/pale skin     Post-Op Follow Up Appointments    We will call you to schedule these appointments after your discharge from the hospital.   Incision assessment within 2 weeks with a Registered Nurse   6 week post-op with a Nurse Practitioner/Physician Assistant. Your surgeon will be available on this day.

## 2025-06-11 NOTE — LETTER
"2025      Kacy Herrera  [address]   [place] [address]      Dear Colleague,    Thank you for referring your patient, Kacy Herrera, to the [facility]. Please see a copy of my visit note below.    Dear Ms. Vilma Vázquez and Ms. Radha Ewing,     Thank you for the opportunity to participate in the care of Kacy Herrera.     As you know, she is a 55-year-old female who presents with concerns of acute onset right lower extremity radiculopathy.  This occurred towards the latter end of her vacation in [facility].  The patient bent over after \"being knocked over by a wave\" the day prior.  The patient has been treated with prednisone and muscle relaxers.  She has had 2 epidural steroid injections.  She had approximately 3 weeks of pain relief with \"97% improvement\" after her second injection.  She did initially report weakness of her right ankle, but this is improving since the initial onset of her pain symptoms.    She is retired. She used to work as a lunch lady. Her medical history is notable for the following: arthritis, cholelithiasis, depression. Her surgery history is notable for:   Past Surgical History:   Procedure Laterality Date     ARTHROPLASTY REVISION KNEE Left 2022    Procedure: Left revision left total knee arthroplasty using a Ashwini LCCK knee system;  Surgeon: Bradly Treviño MD;  Location:  OR     ARTHROSCOPY KNEE WITH MENISCAL REPAIR Left       SECTION       FOOT SURGERY       JOINT REPLACEMENT Left 2016    uni compartmenta     JOINT REPLACEMENT (aka KNEE) NOS Left      LAPAROSCOPIC CHOLECYSTECTOMY N/A 2020    Procedure: CHOLECYSTECTOMY, LAPAROSCOPIC with cholangiograms;  Surgeon: Shirley Purcell MD;  Location:  OR     ZZC PLASTY KNEE,MED OR LAT COMPARTMT Left 2017    Procedure: CONVERT LEFT UNICOMPARTMENTAL ;  Surgeon: Josh Moseley MD;  Location: [facility];  Service: Orthopedics     " "Z TOTAL KNEE ARTHROPLASTY Left 11/1/2017    Procedure: TO TOTAL KNEE ARTHROPLASTY;  Surgeon: Josh Moseley MD;  Location: [facility];  Service: Orthopedics     Of note, the patient has undergone a left-sided L5-S1 microdiscectomy with Dr. Arnold.     On exam, she has 5/5 strength with bilateral hip flexion, knee flexion, and knee extension. She has the following deficits:   Extensor hallucis longus 3/5 on R, 4/5 on L  Dorsiflexion 4/5 on R, 5/5 on L    On my personal review, the patient has evidence of postsurgical changes of a hemilaminectomy at L5-S1 on the left side.  She has a new disc herniation emanating from a paracentral disc protrusion with rostral migration of the disc towards the right side. Her flexion/extension imaging demonstrates no obvious instability at L5-S1.           In assessment, Kacy likely has a right sided L5 radiculopathy given her disc herniation and associated weakness and has failed medical management.    We reviewed the available options including ongoing conservative management with physical therapy, medications, and repeat steroid injections.    The next possible option would be a right sided hemilaminectomy and microdiscectomy at L5-S1.  I would perform this as an open approach given her prior surgery on the left side.  I will resect any visible left sided disc if visible and accessible but will not attempt to perform a redo decompression of the left side given her lack of pain symptoms on the left side. I detailed the risks of surgery which include: nerve injury, CSF leak, failure to improve preoperative symptoms, recurrent disc herniation, infection, bleeding, and need for future spinal instrumentation.  After this discussion, the patient stated that she would like to proceed with scheduling, and will think about all the information provided today.  All questions were answered at the end of the discussion.    Ryan \"Florin\" MD [place]    of Neurosurgery, " [facility]      Again, thank you for allowing me to participate in the care of your patient.        Sincerely,        Ryan Atkinson MD    Electronically signed

## 2025-06-11 NOTE — NURSING NOTE
"Kacy Herrera is a 55 year old female who presents for:  Chief Complaint   Patient presents with    Consult     Lumbar radiculopathy. Patient reports pain and burning sensations in right buttocks down to outside of ankle. No numbness/tingling.        Initial Vitals:  BP (!) 196/104   Pulse 88   Ht 5' 2\" (1.575 m)   Wt 180 lb (81.6 kg)   SpO2 98%   BMI 32.92 kg/m   Estimated body mass index is 32.92 kg/m  as calculated from the following:    Height as of this encounter: 5' 2\" (1.575 m).    Weight as of this encounter: 180 lb (81.6 kg). Body surface area is 1.89 meters squared. BP completed using cuff size: regular  Severe Pain (8)    Patient BP is elevated today and would like a recheck after the appointment.      Efrain English    "

## 2025-06-12 ENCOUNTER — TELEPHONE (OUTPATIENT)
Dept: NEUROSURGERY | Facility: CLINIC | Age: 55
End: 2025-06-12
Payer: COMMERCIAL

## 2025-06-12 NOTE — TELEPHONE ENCOUNTER
Patient called in and we went over potential surgery dates. Patient requested 7/3. I advised her I would get scheduled and call her back with surgery details.

## 2025-06-24 ENCOUNTER — TRANSFERRED RECORDS (OUTPATIENT)
Dept: MULTI SPECIALTY CLINIC | Facility: CLINIC | Age: 55
End: 2025-06-24
Payer: COMMERCIAL

## 2025-06-24 LAB
ALT SERPL-CCNC: 39 U/L (ref 4–35)
AST SERPL-CCNC: 31 U/L (ref 12–35)
CREATININE (EXTERNAL): 0.8 MG/DL (ref 0.5–1.5)
GLUCOSE (EXTERNAL): 103 MG/DL (ref 60–115)
POTASSIUM (EXTERNAL): 4.9 MMOL/L (ref 3.6–5.1)

## 2025-07-02 ENCOUNTER — TELEPHONE (OUTPATIENT)
Dept: NEUROSURGERY | Facility: CLINIC | Age: 55
End: 2025-07-02
Payer: COMMERCIAL

## 2025-07-02 ENCOUNTER — ANESTHESIA EVENT (OUTPATIENT)
Dept: SURGERY | Facility: HOSPITAL | Age: 55
End: 2025-07-02
Payer: COMMERCIAL

## 2025-07-02 NOTE — TELEPHONE ENCOUNTER
Reviewed H&P from 06/24/2025 - cleared for surgery  Labs - WNL    MRI done on 06/03/2025 - in [place]    To OR as planned.     Marjan Barba, RN, CNRN

## 2025-07-03 ENCOUNTER — APPOINTMENT (OUTPATIENT)
Dept: RADIOLOGY | Facility: HOSPITAL | Age: 55
End: 2025-07-03
Attending: STUDENT IN AN ORGANIZED HEALTH CARE EDUCATION/TRAINING PROGRAM
Payer: COMMERCIAL

## 2025-07-03 ENCOUNTER — HOSPITAL ENCOUNTER (INPATIENT)
Facility: HOSPITAL | Age: 55
End: 2025-07-03
Attending: STUDENT IN AN ORGANIZED HEALTH CARE EDUCATION/TRAINING PROGRAM | Admitting: STUDENT IN AN ORGANIZED HEALTH CARE EDUCATION/TRAINING PROGRAM
Payer: COMMERCIAL

## 2025-07-03 ENCOUNTER — ANESTHESIA (OUTPATIENT)
Dept: SURGERY | Facility: HOSPITAL | Age: 55
End: 2025-07-03
Payer: COMMERCIAL

## 2025-07-03 DIAGNOSIS — Z98.890 S/P LUMBAR LAMINECTOMY: Primary | ICD-10-CM

## 2025-07-03 LAB
ABO + RH BLD: NORMAL
ANION GAP SERPL CALCULATED.3IONS-SCNC: 12 MMOL/L (ref 7–15)
APTT PPP: 26 SECONDS (ref 22–38)
BLD GP AB SCN SERPL QL: NEGATIVE
BUN SERPL-MCNC: 15.1 MG/DL (ref 6–20)
CALCIUM SERPL-MCNC: 8.9 MG/DL (ref 8.8–10.4)
CHLORIDE SERPL-SCNC: 103 MMOL/L (ref 98–107)
CREAT SERPL-MCNC: 0.65 MG/DL (ref 0.51–0.95)
EGFRCR SERPLBLD CKD-EPI 2021: >90 ML/MIN/1.73M2
ERYTHROCYTE [DISTWIDTH] IN BLOOD BY AUTOMATED COUNT: 13.8 % (ref 10–15)
GLUCOSE BLDC GLUCOMTR-MCNC: 108 MG/DL (ref 70–99)
GLUCOSE SERPL-MCNC: 110 MG/DL (ref 70–99)
HCO3 SERPL-SCNC: 25 MMOL/L (ref 22–29)
HCT VFR BLD AUTO: 40.3 % (ref 35–47)
HGB BLD-MCNC: 13.2 G/DL (ref 11.7–15.7)
INR PPP: 0.96 (ref 0.85–1.15)
MCH RBC QN AUTO: 29.9 PG (ref 26.5–33)
MCHC RBC AUTO-ENTMCNC: 32.8 G/DL (ref 31.5–36.5)
MCV RBC AUTO: 91 FL (ref 78–100)
PLATELET # BLD AUTO: 246 10E3/UL (ref 150–450)
POTASSIUM SERPL-SCNC: 4.1 MMOL/L (ref 3.4–5.3)
PROTHROMBIN TIME: 13.1 SECONDS (ref 11.8–14.8)
RBC # BLD AUTO: 4.41 10E6/UL (ref 3.8–5.2)
SODIUM SERPL-SCNC: 140 MMOL/L (ref 135–145)
SPECIMEN EXP DATE BLD: NORMAL
WBC # BLD AUTO: 9.2 10E3/UL (ref 4–11)

## 2025-07-03 PROCEDURE — 82310 ASSAY OF CALCIUM: CPT

## 2025-07-03 PROCEDURE — 0SB40ZZ EXCISION OF LUMBOSACRAL DISC, OPEN APPROACH: ICD-10-PCS | Performed by: STUDENT IN AN ORGANIZED HEALTH CARE EDUCATION/TRAINING PROGRAM

## 2025-07-03 PROCEDURE — 85041 AUTOMATED RBC COUNT: CPT

## 2025-07-03 PROCEDURE — 370N000017 HC ANESTHESIA TECHNICAL FEE, PER MIN: Performed by: STUDENT IN AN ORGANIZED HEALTH CARE EDUCATION/TRAINING PROGRAM

## 2025-07-03 PROCEDURE — 250N000009 HC RX 250: Performed by: STUDENT IN AN ORGANIZED HEALTH CARE EDUCATION/TRAINING PROGRAM

## 2025-07-03 PROCEDURE — 250N000011 HC RX IP 250 OP 636: Performed by: ANESTHESIOLOGY

## 2025-07-03 PROCEDURE — 999N000063 XR LUMBAR SPINE PORT 1 VIEW

## 2025-07-03 PROCEDURE — 258N000003 HC RX IP 258 OP 636: Performed by: ANESTHESIOLOGY

## 2025-07-03 PROCEDURE — 272N000004 HC RX 272: Performed by: STUDENT IN AN ORGANIZED HEALTH CARE EDUCATION/TRAINING PROGRAM

## 2025-07-03 PROCEDURE — 85014 HEMATOCRIT: CPT

## 2025-07-03 PROCEDURE — 63030 LAMOT DCMPRN NRV RT 1 LMBR: CPT | Mod: RT | Performed by: STUDENT IN AN ORGANIZED HEALTH CARE EDUCATION/TRAINING PROGRAM

## 2025-07-03 PROCEDURE — 250N000009 HC RX 250: Performed by: ANESTHESIOLOGY

## 2025-07-03 PROCEDURE — 86901 BLOOD TYPING SEROLOGIC RH(D): CPT

## 2025-07-03 PROCEDURE — 120N000001 HC R&B MED SURG/OB

## 2025-07-03 PROCEDURE — 250N000013 HC RX MED GY IP 250 OP 250 PS 637

## 2025-07-03 PROCEDURE — 360N000084 HC SURGERY LEVEL 4 W/ FLUORO, PER MIN: Performed by: STUDENT IN AN ORGANIZED HEALTH CARE EDUCATION/TRAINING PROGRAM

## 2025-07-03 PROCEDURE — 250N000011 HC RX IP 250 OP 636

## 2025-07-03 PROCEDURE — 272N000001 HC OR GENERAL SUPPLY STERILE: Performed by: STUDENT IN AN ORGANIZED HEALTH CARE EDUCATION/TRAINING PROGRAM

## 2025-07-03 PROCEDURE — 36415 COLL VENOUS BLD VENIPUNCTURE: CPT

## 2025-07-03 PROCEDURE — 00NY0ZZ RELEASE LUMBAR SPINAL CORD, OPEN APPROACH: ICD-10-PCS | Performed by: STUDENT IN AN ORGANIZED HEALTH CARE EDUCATION/TRAINING PROGRAM

## 2025-07-03 PROCEDURE — 01NB0ZZ RELEASE LUMBAR NERVE, OPEN APPROACH: ICD-10-PCS | Performed by: STUDENT IN AN ORGANIZED HEALTH CARE EDUCATION/TRAINING PROGRAM

## 2025-07-03 PROCEDURE — 80048 BASIC METABOLIC PNL TOTAL CA: CPT

## 2025-07-03 PROCEDURE — 710N000009 HC RECOVERY PHASE 1, LEVEL 1, PER MIN: Performed by: STUDENT IN AN ORGANIZED HEALTH CARE EDUCATION/TRAINING PROGRAM

## 2025-07-03 PROCEDURE — 999N000063 XR LUMBAR SPINE PORT 2/3 VIEWS

## 2025-07-03 PROCEDURE — 250N000013 HC RX MED GY IP 250 OP 250 PS 637: Performed by: NURSE PRACTITIONER

## 2025-07-03 PROCEDURE — 258N000003 HC RX IP 258 OP 636

## 2025-07-03 PROCEDURE — 85730 THROMBOPLASTIN TIME PARTIAL: CPT

## 2025-07-03 PROCEDURE — 999N000141 HC STATISTIC PRE-PROCEDURE NURSING ASSESSMENT: Performed by: STUDENT IN AN ORGANIZED HEALTH CARE EDUCATION/TRAINING PROGRAM

## 2025-07-03 PROCEDURE — 85610 PROTHROMBIN TIME: CPT

## 2025-07-03 PROCEDURE — 82962 GLUCOSE BLOOD TEST: CPT

## 2025-07-03 PROCEDURE — 250N000025 HC SEVOFLURANE, PER MIN: Performed by: STUDENT IN AN ORGANIZED HEALTH CARE EDUCATION/TRAINING PROGRAM

## 2025-07-03 RX ORDER — NALOXONE HYDROCHLORIDE 0.4 MG/ML
0.4 INJECTION, SOLUTION INTRAMUSCULAR; INTRAVENOUS; SUBCUTANEOUS
Status: ACTIVE | OUTPATIENT
Start: 2025-07-03

## 2025-07-03 RX ORDER — MAGNESIUM HYDROXIDE 1200 MG/15ML
LIQUID ORAL PRN
Status: DISCONTINUED | OUTPATIENT
Start: 2025-07-03 | End: 2025-07-03 | Stop reason: HOSPADM

## 2025-07-03 RX ORDER — NALOXONE HYDROCHLORIDE 0.4 MG/ML
0.2 INJECTION, SOLUTION INTRAMUSCULAR; INTRAVENOUS; SUBCUTANEOUS
Status: ACTIVE | OUTPATIENT
Start: 2025-07-03

## 2025-07-03 RX ORDER — PROPOFOL 10 MG/ML
INJECTION, EMULSION INTRAVENOUS PRN
Status: DISCONTINUED | OUTPATIENT
Start: 2025-07-03 | End: 2025-07-03

## 2025-07-03 RX ORDER — PROCHLORPERAZINE MALEATE 10 MG
10 TABLET ORAL EVERY 6 HOURS PRN
Status: ACTIVE | OUTPATIENT
Start: 2025-07-03

## 2025-07-03 RX ORDER — OXYCODONE HYDROCHLORIDE 5 MG/1
10 TABLET ORAL EVERY 4 HOURS PRN
Status: DISCONTINUED | OUTPATIENT
Start: 2025-07-03 | End: 2025-07-03

## 2025-07-03 RX ORDER — HYDROMORPHONE HCL IN WATER/PF 6 MG/30 ML
0.4 PATIENT CONTROLLED ANALGESIA SYRINGE INTRAVENOUS EVERY 5 MIN PRN
Status: DISCONTINUED | OUTPATIENT
Start: 2025-07-03 | End: 2025-07-03 | Stop reason: HOSPADM

## 2025-07-03 RX ORDER — CEFAZOLIN SODIUM/WATER 2 G/20 ML
2 SYRINGE (ML) INTRAVENOUS
Status: COMPLETED | OUTPATIENT
Start: 2025-07-03 | End: 2025-07-03

## 2025-07-03 RX ORDER — DEXAMETHASONE SODIUM PHOSPHATE 10 MG/ML
4 INJECTION, SOLUTION INTRAMUSCULAR; INTRAVENOUS
Status: DISCONTINUED | OUTPATIENT
Start: 2025-07-03 | End: 2025-07-03

## 2025-07-03 RX ORDER — OXYCODONE HYDROCHLORIDE 5 MG/1
5 TABLET ORAL EVERY 4 HOURS PRN
Status: DISCONTINUED | OUTPATIENT
Start: 2025-07-03 | End: 2025-07-03

## 2025-07-03 RX ORDER — KETOROLAC TROMETHAMINE 30 MG/ML
INJECTION, SOLUTION INTRAMUSCULAR; INTRAVENOUS PRN
Status: DISCONTINUED | OUTPATIENT
Start: 2025-07-03 | End: 2025-07-03

## 2025-07-03 RX ORDER — ACETAMINOPHEN 325 MG/1
975 TABLET ORAL EVERY 8 HOURS
Status: DISPENSED | OUTPATIENT
Start: 2025-07-03

## 2025-07-03 RX ORDER — ACETAMINOPHEN 325 MG/1
975 TABLET ORAL ONCE
Status: COMPLETED | OUTPATIENT
Start: 2025-07-03 | End: 2025-07-03

## 2025-07-03 RX ORDER — HYDROMORPHONE HCL IN WATER/PF 6 MG/30 ML
0.4 PATIENT CONTROLLED ANALGESIA SYRINGE INTRAVENOUS
Status: ACTIVE | OUTPATIENT
Start: 2025-07-03

## 2025-07-03 RX ORDER — AMOXICILLIN 250 MG
1 CAPSULE ORAL 2 TIMES DAILY
Status: DISPENSED | OUTPATIENT
Start: 2025-07-03

## 2025-07-03 RX ORDER — POLYETHYLENE GLYCOL 3350 17 G/17G
17 POWDER, FOR SOLUTION ORAL DAILY
Status: ACTIVE | OUTPATIENT
Start: 2025-07-04

## 2025-07-03 RX ORDER — ONDANSETRON 2 MG/ML
4 INJECTION INTRAMUSCULAR; INTRAVENOUS EVERY 30 MIN PRN
Status: DISCONTINUED | OUTPATIENT
Start: 2025-07-03 | End: 2025-07-03 | Stop reason: HOSPADM

## 2025-07-03 RX ORDER — NALOXONE HYDROCHLORIDE 0.4 MG/ML
0.1 INJECTION, SOLUTION INTRAMUSCULAR; INTRAVENOUS; SUBCUTANEOUS
Status: DISCONTINUED | OUTPATIENT
Start: 2025-07-03 | End: 2025-07-03

## 2025-07-03 RX ORDER — METHOCARBAMOL 750 MG/1
750 TABLET, FILM COATED ORAL EVERY 6 HOURS PRN
Status: DISPENSED | OUTPATIENT
Start: 2025-07-03

## 2025-07-03 RX ORDER — OXYCODONE HYDROCHLORIDE 5 MG/1
5 TABLET ORAL
Status: DISCONTINUED | OUTPATIENT
Start: 2025-07-03 | End: 2025-07-03

## 2025-07-03 RX ORDER — DEXAMETHASONE SODIUM PHOSPHATE 10 MG/ML
INJECTION, SOLUTION INTRAMUSCULAR; INTRAVENOUS PRN
Status: DISCONTINUED | OUTPATIENT
Start: 2025-07-03 | End: 2025-07-03

## 2025-07-03 RX ORDER — FENTANYL CITRATE 50 UG/ML
INJECTION, SOLUTION INTRAMUSCULAR; INTRAVENOUS PRN
Status: DISCONTINUED | OUTPATIENT
Start: 2025-07-03 | End: 2025-07-03

## 2025-07-03 RX ORDER — NALOXONE HYDROCHLORIDE 0.4 MG/ML
0.1 INJECTION, SOLUTION INTRAMUSCULAR; INTRAVENOUS; SUBCUTANEOUS
Status: DISCONTINUED | OUTPATIENT
Start: 2025-07-03 | End: 2025-07-03 | Stop reason: HOSPADM

## 2025-07-03 RX ORDER — THERA TABS 400 MCG
1 TAB ORAL DAILY
Status: ACTIVE | OUTPATIENT
Start: 2025-07-04

## 2025-07-03 RX ORDER — LIDOCAINE HYDROCHLORIDE 10 MG/ML
INJECTION, SOLUTION INFILTRATION; PERINEURAL PRN
Status: DISCONTINUED | OUTPATIENT
Start: 2025-07-03 | End: 2025-07-03

## 2025-07-03 RX ORDER — SODIUM CHLORIDE, SODIUM LACTATE, POTASSIUM CHLORIDE, CALCIUM CHLORIDE 600; 310; 30; 20 MG/100ML; MG/100ML; MG/100ML; MG/100ML
INJECTION, SOLUTION INTRAVENOUS CONTINUOUS
Status: DISCONTINUED | OUTPATIENT
Start: 2025-07-03 | End: 2025-07-03 | Stop reason: HOSPADM

## 2025-07-03 RX ORDER — CEFAZOLIN SODIUM/WATER 2 G/20 ML
2 SYRINGE (ML) INTRAVENOUS SEE ADMIN INSTRUCTIONS
Status: DISCONTINUED | OUTPATIENT
Start: 2025-07-03 | End: 2025-07-03 | Stop reason: HOSPADM

## 2025-07-03 RX ORDER — ONDANSETRON 2 MG/ML
INJECTION INTRAMUSCULAR; INTRAVENOUS PRN
Status: DISCONTINUED | OUTPATIENT
Start: 2025-07-03 | End: 2025-07-03

## 2025-07-03 RX ORDER — DEXAMETHASONE SODIUM PHOSPHATE 10 MG/ML
4 INJECTION, SOLUTION INTRAMUSCULAR; INTRAVENOUS
Status: DISCONTINUED | OUTPATIENT
Start: 2025-07-03 | End: 2025-07-03 | Stop reason: HOSPADM

## 2025-07-03 RX ORDER — FENTANYL CITRATE 50 UG/ML
25 INJECTION, SOLUTION INTRAMUSCULAR; INTRAVENOUS EVERY 5 MIN PRN
Status: DISCONTINUED | OUTPATIENT
Start: 2025-07-03 | End: 2025-07-03 | Stop reason: HOSPADM

## 2025-07-03 RX ORDER — FENTANYL CITRATE 50 UG/ML
50 INJECTION, SOLUTION INTRAMUSCULAR; INTRAVENOUS EVERY 5 MIN PRN
Status: DISCONTINUED | OUTPATIENT
Start: 2025-07-03 | End: 2025-07-03 | Stop reason: HOSPADM

## 2025-07-03 RX ORDER — KETAMINE HYDROCHLORIDE 10 MG/ML
INJECTION INTRAMUSCULAR; INTRAVENOUS PRN
Status: DISCONTINUED | OUTPATIENT
Start: 2025-07-03 | End: 2025-07-03

## 2025-07-03 RX ORDER — BISACODYL 10 MG
10 SUPPOSITORY, RECTAL RECTAL DAILY PRN
Status: ACTIVE | OUTPATIENT
Start: 2025-07-06

## 2025-07-03 RX ORDER — LORATADINE 10 MG/1
10 TABLET ORAL DAILY PRN
Status: ACTIVE | OUTPATIENT
Start: 2025-07-03

## 2025-07-03 RX ORDER — ONDANSETRON 4 MG/1
4 TABLET, ORALLY DISINTEGRATING ORAL EVERY 30 MIN PRN
Status: DISCONTINUED | OUTPATIENT
Start: 2025-07-03 | End: 2025-07-03

## 2025-07-03 RX ORDER — CEFAZOLIN SODIUM/WATER 2 G/20 ML
SYRINGE (ML) INTRAVENOUS
Status: DISCONTINUED
Start: 2025-07-03 | End: 2025-07-03 | Stop reason: HOSPADM

## 2025-07-03 RX ORDER — BUPIVACAINE HYDROCHLORIDE AND EPINEPHRINE 2.5; 5 MG/ML; UG/ML
INJECTION, SOLUTION EPIDURAL; INFILTRATION; INTRACAUDAL; PERINEURAL PRN
Status: DISCONTINUED | OUTPATIENT
Start: 2025-07-03 | End: 2025-07-03 | Stop reason: HOSPADM

## 2025-07-03 RX ORDER — HYDROMORPHONE HCL IN WATER/PF 6 MG/30 ML
0.2 PATIENT CONTROLLED ANALGESIA SYRINGE INTRAVENOUS
Status: DISPENSED | OUTPATIENT
Start: 2025-07-03

## 2025-07-03 RX ORDER — HYDROMORPHONE HCL IN WATER/PF 6 MG/30 ML
0.2 PATIENT CONTROLLED ANALGESIA SYRINGE INTRAVENOUS EVERY 5 MIN PRN
Status: DISCONTINUED | OUTPATIENT
Start: 2025-07-03 | End: 2025-07-03 | Stop reason: HOSPADM

## 2025-07-03 RX ORDER — ONDANSETRON 4 MG/1
4 TABLET, ORALLY DISINTEGRATING ORAL EVERY 30 MIN PRN
Status: DISCONTINUED | OUTPATIENT
Start: 2025-07-03 | End: 2025-07-03 | Stop reason: HOSPADM

## 2025-07-03 RX ORDER — PROPOFOL 10 MG/ML
INJECTION, EMULSION INTRAVENOUS CONTINUOUS PRN
Status: DISCONTINUED | OUTPATIENT
Start: 2025-07-03 | End: 2025-07-03

## 2025-07-03 RX ORDER — ONDANSETRON 4 MG/1
4 TABLET, ORALLY DISINTEGRATING ORAL EVERY 6 HOURS PRN
Status: ACTIVE | OUTPATIENT
Start: 2025-07-03

## 2025-07-03 RX ORDER — ONDANSETRON 2 MG/ML
4 INJECTION INTRAMUSCULAR; INTRAVENOUS EVERY 30 MIN PRN
Status: DISCONTINUED | OUTPATIENT
Start: 2025-07-03 | End: 2025-07-03

## 2025-07-03 RX ORDER — HYDROMORPHONE HYDROCHLORIDE 4 MG/1
4 TABLET ORAL EVERY 4 HOURS PRN
Refills: 0 | Status: ACTIVE | OUTPATIENT
Start: 2025-07-03

## 2025-07-03 RX ORDER — LIDOCAINE 40 MG/G
CREAM TOPICAL
Status: DISCONTINUED | OUTPATIENT
Start: 2025-07-03 | End: 2025-07-03 | Stop reason: HOSPADM

## 2025-07-03 RX ORDER — OXYCODONE HYDROCHLORIDE 5 MG/1
10 TABLET ORAL
Status: DISCONTINUED | OUTPATIENT
Start: 2025-07-03 | End: 2025-07-03

## 2025-07-03 RX ORDER — VENLAFAXINE HYDROCHLORIDE 150 MG/1
150 CAPSULE, EXTENDED RELEASE ORAL EVERY EVENING
Status: DISPENSED | OUTPATIENT
Start: 2025-07-03

## 2025-07-03 RX ORDER — GABAPENTIN 300 MG/1
300 CAPSULE ORAL
Status: COMPLETED | OUTPATIENT
Start: 2025-07-03 | End: 2025-07-03

## 2025-07-03 RX ORDER — CEFAZOLIN SODIUM 2 G/50ML
2 SOLUTION INTRAVENOUS EVERY 8 HOURS
Status: DISPENSED | OUTPATIENT
Start: 2025-07-03 | End: 2025-07-04

## 2025-07-03 RX ORDER — HYDROMORPHONE HYDROCHLORIDE 2 MG/1
2 TABLET ORAL EVERY 4 HOURS PRN
Refills: 0 | Status: DISPENSED | OUTPATIENT
Start: 2025-07-03

## 2025-07-03 RX ORDER — SODIUM CHLORIDE 9 MG/ML
INJECTION, SOLUTION INTRAVENOUS CONTINUOUS
Status: ACTIVE | OUTPATIENT
Start: 2025-07-03

## 2025-07-03 RX ORDER — ONDANSETRON 2 MG/ML
4 INJECTION INTRAMUSCULAR; INTRAVENOUS EVERY 6 HOURS PRN
Status: ACTIVE | OUTPATIENT
Start: 2025-07-03

## 2025-07-03 RX ADMIN — HYDROMORPHONE HYDROCHLORIDE 0.2 MG: 0.2 INJECTION, SOLUTION INTRAMUSCULAR; INTRAVENOUS; SUBCUTANEOUS at 13:27

## 2025-07-03 RX ADMIN — KETOROLAC TROMETHAMINE 15 MG: 30 INJECTION, SOLUTION INTRAMUSCULAR at 10:32

## 2025-07-03 RX ADMIN — ONDANSETRON 4 MG: 2 INJECTION INTRAMUSCULAR; INTRAVENOUS at 10:29

## 2025-07-03 RX ADMIN — KETAMINE HYDROCHLORIDE 10 MG: 10 INJECTION INTRAMUSCULAR; INTRAVENOUS at 09:03

## 2025-07-03 RX ADMIN — HYDROMORPHONE HYDROCHLORIDE 0.5 MG: 1 INJECTION, SOLUTION INTRAMUSCULAR; INTRAVENOUS; SUBCUTANEOUS at 09:37

## 2025-07-03 RX ADMIN — HYDROMORPHONE HYDROCHLORIDE 0.2 MG: 0.2 INJECTION, SOLUTION INTRAMUSCULAR; INTRAVENOUS; SUBCUTANEOUS at 16:52

## 2025-07-03 RX ADMIN — VENLAFAXINE HYDROCHLORIDE 150 MG: 150 CAPSULE, EXTENDED RELEASE ORAL at 19:53

## 2025-07-03 RX ADMIN — FENTANYL CITRATE 25 MCG: 50 INJECTION, SOLUTION INTRAMUSCULAR; INTRAVENOUS at 13:00

## 2025-07-03 RX ADMIN — LIDOCAINE HYDROCHLORIDE 5 ML: 10 INJECTION, SOLUTION INFILTRATION; PERINEURAL at 07:35

## 2025-07-03 RX ADMIN — PHENYLEPHRINE HYDROCHLORIDE 0.2 MCG/KG/MIN: 10 INJECTION INTRAVENOUS at 08:49

## 2025-07-03 RX ADMIN — SODIUM CHLORIDE 8 MCG: 9 INJECTION, SOLUTION INTRAVENOUS at 10:35

## 2025-07-03 RX ADMIN — HYDROMORPHONE HYDROCHLORIDE 2 MG: 2 TABLET ORAL at 19:53

## 2025-07-03 RX ADMIN — FENTANYL CITRATE 50 MCG: 50 INJECTION, SOLUTION INTRAMUSCULAR; INTRAVENOUS at 07:42

## 2025-07-03 RX ADMIN — HYDROMORPHONE HYDROCHLORIDE 0.5 MG: 1 INJECTION, SOLUTION INTRAMUSCULAR; INTRAVENOUS; SUBCUTANEOUS at 09:50

## 2025-07-03 RX ADMIN — HYDROMORPHONE HYDROCHLORIDE 0.2 MG: 0.2 INJECTION, SOLUTION INTRAMUSCULAR; INTRAVENOUS; SUBCUTANEOUS at 14:25

## 2025-07-03 RX ADMIN — Medication 2 G: at 07:42

## 2025-07-03 RX ADMIN — ACETAMINOPHEN 975 MG: 325 TABLET ORAL at 07:00

## 2025-07-03 RX ADMIN — SODIUM CHLORIDE, SODIUM LACTATE, POTASSIUM CHLORIDE, AND CALCIUM CHLORIDE: .6; .31; .03; .02 INJECTION, SOLUTION INTRAVENOUS at 09:47

## 2025-07-03 RX ADMIN — FENTANYL CITRATE 25 MCG: 50 INJECTION, SOLUTION INTRAMUSCULAR; INTRAVENOUS at 11:50

## 2025-07-03 RX ADMIN — KETAMINE HYDROCHLORIDE 30 MG: 10 INJECTION INTRAMUSCULAR; INTRAVENOUS at 08:13

## 2025-07-03 RX ADMIN — FENTANYL CITRATE 25 MCG: 50 INJECTION, SOLUTION INTRAMUSCULAR; INTRAVENOUS at 11:40

## 2025-07-03 RX ADMIN — PROPOFOL 50 MCG/KG/MIN: 10 INJECTION, EMULSION INTRAVENOUS at 08:13

## 2025-07-03 RX ADMIN — PROPOFOL 50 MG: 10 INJECTION, EMULSION INTRAVENOUS at 07:44

## 2025-07-03 RX ADMIN — ROCURONIUM 50 MG: 50 INJECTION, SOLUTION INTRAVENOUS at 07:37

## 2025-07-03 RX ADMIN — FENTANYL CITRATE 50 MCG: 50 INJECTION, SOLUTION INTRAMUSCULAR; INTRAVENOUS at 07:35

## 2025-07-03 RX ADMIN — MIDAZOLAM HYDROCHLORIDE 2 MG: 1 INJECTION, SOLUTION INTRAMUSCULAR; INTRAVENOUS at 07:28

## 2025-07-03 RX ADMIN — KETAMINE HYDROCHLORIDE 10 MG: 10 INJECTION INTRAMUSCULAR; INTRAVENOUS at 10:08

## 2025-07-03 RX ADMIN — METHOCARBAMOL 750 MG: 750 TABLET ORAL at 16:51

## 2025-07-03 RX ADMIN — DOCUSATE SODIUM 50 MG AND SENNOSIDES 8.6 MG 1 TABLET: 8.6; 5 TABLET, FILM COATED ORAL at 19:53

## 2025-07-03 RX ADMIN — CEFAZOLIN SODIUM 2 G: 2 SOLUTION INTRAVENOUS at 17:08

## 2025-07-03 RX ADMIN — SODIUM CHLORIDE, SODIUM LACTATE, POTASSIUM CHLORIDE, AND CALCIUM CHLORIDE: .6; .31; .03; .02 INJECTION, SOLUTION INTRAVENOUS at 06:59

## 2025-07-03 RX ADMIN — FENTANYL CITRATE 25 MCG: 50 INJECTION, SOLUTION INTRAMUSCULAR; INTRAVENOUS at 12:10

## 2025-07-03 RX ADMIN — DEXAMETHASONE SODIUM PHOSPHATE 10 MG: 10 INJECTION, SOLUTION INTRAMUSCULAR; INTRAVENOUS at 08:13

## 2025-07-03 RX ADMIN — SODIUM CHLORIDE 8 MCG: 9 INJECTION, SOLUTION INTRAVENOUS at 10:26

## 2025-07-03 RX ADMIN — SODIUM CHLORIDE: 0.9 INJECTION, SOLUTION INTRAVENOUS at 17:08

## 2025-07-03 RX ADMIN — GABAPENTIN 300 MG: 300 CAPSULE ORAL at 07:00

## 2025-07-03 RX ADMIN — ACETAMINOPHEN 975 MG: 325 TABLET ORAL at 16:51

## 2025-07-03 RX ADMIN — PROPOFOL 200 MG: 10 INJECTION, EMULSION INTRAVENOUS at 07:35

## 2025-07-03 ASSESSMENT — ACTIVITIES OF DAILY LIVING (ADL)
ADLS_ACUITY_SCORE: 23
ADLS_ACUITY_SCORE: 31
ADLS_ACUITY_SCORE: 23
ADLS_ACUITY_SCORE: 23
ADLS_ACUITY_SCORE: 31
ADLS_ACUITY_SCORE: 23
ADLS_ACUITY_SCORE: 40
ADLS_ACUITY_SCORE: 23
ADLS_ACUITY_SCORE: 23
ADLS_ACUITY_SCORE: 31
ADLS_ACUITY_SCORE: 23
ADLS_ACUITY_SCORE: 23
ADLS_ACUITY_SCORE: 31

## 2025-07-03 ASSESSMENT — LIFESTYLE VARIABLES: TOBACCO_USE: 1

## 2025-07-03 NOTE — ANESTHESIA POSTPROCEDURE EVALUATION
Patient: Kacy Herrera    Procedure: Procedure(s):  Right sided Lumbar 5 to Sacral 1 hemilaminectomy, possible medial facetectomy, microdiscectomy       Anesthesia Type:  General    Note:  Disposition: Outpatient   Postop Pain Control: Uneventful            Sign Out: Well controlled pain   PONV: No   Neuro/Psych: Uneventful            Sign Out: Acceptable/Baseline neuro status   Airway/Respiratory: Uneventful            Sign Out: Acceptable/Baseline resp. status   CV/Hemodynamics: Uneventful            Sign Out: Acceptable CV status; No obvious hypovolemia; No obvious fluid overload   Other NRE: NONE   DID A NON-ROUTINE EVENT OCCUR? No           Last vitals:  Vitals Value Taken Time   /82 07/03/25 12:30   Temp 36.9  C (98.5  F) 07/03/25 11:45   Pulse 97 07/03/25 12:33   Resp 15 07/03/25 12:33   SpO2 97 % 07/03/25 12:33   Vitals shown include unfiled device data.    Electronically Signed By: Tommy Maldonado MD  July 3, 2025  12:34 PM

## 2025-07-03 NOTE — BRIEF OP NOTE
"   [facility]    Brief Operative Note    Pre-operative diagnosis: Lumbar radiculopathy [M54.16]  Post-operative diagnosis Same as pre-operative diagnosis    Procedure: Right sided Lumbar 5 to Sacral 1 hemilaminectomy, possible medial facetectomy, microdiscectomy, Right - Spine    Surgeon: Surgeons and Role:     * Ryan Atkinson MD - Primary     * Michelle Saleh APRN CNP  Anesthesia: General   Estimated Blood Loss: 30 ml     Drains: Zafar-Shaikh  Specimens: * No specimens in log *  Findings:   Large paracentral L5-S1 disc herniation with partial calcification and rostral migration of disc fragments leading to compression of the right L5 nerve root. Right L5 nerve root and thecal sac well decompressed by end of operation.  Complications: None.  Implants: * No implants in log *          Ryan \"Florin\" MD [place]    of Neurosurgery, [facility]   [facility]            "

## 2025-07-03 NOTE — ANESTHESIA PROCEDURE NOTES
Airway       Patient location during procedure: OR       Procedure Start/Stop Times: 7/3/2025 7:40 AM  Staff -        Other Anesthesia Staff: Helga Weir       Performed By: SRNA  Consent for Airway        Urgency: elective  Indications and Patient Condition       Indications for airway management: juan r-procedural       Induction type:intravenous       Mask difficulty assessment: 2 - vent by mask + OA or adjuvant +/- NMBA    Final Airway Details       Final airway type: endotracheal airway       Successful airway: ETT - single  Endotracheal Airway Details        ETT size (mm): 7.0       Cuffed: yes       Successful intubation technique: direct laryngoscopy       DL Blade Type: Rivers 2       Grade View of Cords: 1       Adjucts: stylet       Position: Right       Measured from: gums/teeth       Secured at (cm): 22       Bite block used: Soft    Post intubation assessment        Placement verified by: capnometry, equal breath sounds and chest rise        Number of attempts at approach: 1       Number of other approaches attempted: 0       Secured with: tape       Ease of procedure: easy       Dentition: Intact and Unchanged       Dental guard used and removed. Dental Guard Type: Standard White.    Medication(s) Administered   Medication Administration Time: 7/3/2025 7:40 AM

## 2025-07-03 NOTE — PROGRESS NOTES
Right sided Lumbar 5 to Sacral 1 hemilaminectomy, possible medial facetectomy, microdiscectomy (Right: Spine) with Ryan Atkinson MD  * Day of Surgery *    Pre-operative exam:  Acute onset right lower extremity radiculopathy  5/5 strength bilateral hip flexion, KF/KE  Extensor hallucis longus 3/5 on R, 4/5 on L  DF 4/5 on R, 5/5 on L     Plan:   - BASSAM drain x 1, monitor and record output, even if 0  - Imaging: Not needed at this time    - Abx x 3 doses   - Incision: closed with absorbable suture, steris strips, and primapore dressing   - Pain control measures  - ADAT  - Activity as tolerated  - IS q1H while awake  - Scheduled bowel regimen   - Consults: PT/OT  - Dispo: pending therapies, follow ups scheduled     Michelle Saleh CNP  [facility] Neurosurgery  [address] [place] [id number]  Tel [phone]  Fax [phone]

## 2025-07-03 NOTE — ANESTHESIA PREPROCEDURE EVALUATION
Anesthesia Pre-Procedure Evaluation    Patient: Kacy Herrera   MRN: [id number] : 1970          Procedure : Procedure(s):  Right sided Lumbar 5 to Sacral 1 hemilaminectomy, possible medial facetectomy, microdiscectomy         Past Medical History:   Diagnosis Date    Arthritis     knee left    Cholelithiasis     Depressive disorder     Left knee pain     Lumbar back pain with radiculopathy affecting right lower extremity     Other chronic pain     left knee    Overweight     Tobacco dependence     Vasomotor symptoms due to menopause       Past Surgical History:   Procedure Laterality Date    ARTHROPLASTY REVISION KNEE Left 2022    Procedure: Left revision left total knee arthroplasty using a Ashwini LCCK knee system;  Surgeon: Bradly Treviño MD;  Location: [facility] OR    ARTHROSCOPY KNEE WITH MENISCAL REPAIR Left      SECTION      FOOT SURGERY      JOINT REPLACEMENT Left 2016    uni compartmenta    JOINT REPLACEMENT (aka KNEE) NOS Left     LAPAROSCOPIC CHOLECYSTECTOMY N/A 2020    Procedure: CHOLECYSTECTOMY, LAPAROSCOPIC with cholangiograms;  Surgeon: Shirley Purcell MD;  Location: [facility] OR    LUMBAR DISCECTOMY      ZZC PLASTY KNEE,MED OR LAT COMPARTMT Left 2017    Procedure: CONVERT LEFT UNICOMPARTMENTAL ;  Surgeon: Josh Moseley MD;  Location: [facility] OR;  Service: Orthopedics    [facility] TOTAL KNEE ARTHROPLASTY Left 2017    Procedure: TO TOTAL KNEE ARTHROPLASTY;  Surgeon: Josh Moseley MD;  Location: [facility];  Service: Orthopedics      Allergies   Allergen Reactions    Oxycodone Itching    Zithromax [Azithromycin] Hives      Social History     Tobacco Use    Smoking status: Every Day     Current packs/day: 0.50     Average packs/day: 0.5 packs/day for 15.0 years (7.5 ttl pk-yrs)     Types: Cigarettes    Smokeless tobacco: Never   Substance Use Topics    Alcohol use: Yes     Comment: daily wine-3 glasses      Wt Readings from Last 1 Encounters:    07/03/25 82.5 kg (181 lb 14.4 oz)        Anesthesia Evaluation            ROS/MED HX  ENT/Pulmonary:  - neg pulmonary ROS   (+)                tobacco use (1), Current use, 1 packs/day,  patient smoked within 24 hours,                    Neurologic:  - neg neurologic ROS     Cardiovascular:  - neg cardiovascular ROS     METS/Exercise Tolerance:     Hematologic:  - neg hematologic  ROS     Musculoskeletal:       GI/Hepatic:  - neg GI/hepatic ROS     Renal/Genitourinary:  - neg Renal ROS     Endo:  - neg endo ROS     Psychiatric/Substance Use:  - neg psychiatric ROS     Infectious Disease:  - neg infectious disease ROS     Malignancy:  - neg malignancy ROS     Other:      (+)  , H/O Chronic Pain,           Physical Exam  Airway  Mallampati: II  TM distance: >3 FB    Cardiovascular - normal exam   Dental     Pulmonary       Neurological - normal exam  She appears awake, alert and oriented x3.    Other Findings       OUTSIDE LABS:  CBC:   Lab Results   Component Value Date    WBC 9.2 07/03/2025    WBC 8.7 08/30/2022    HGB 13.2 07/03/2025    HGB 14.1 08/30/2022    HCT 40.3 07/03/2025    HCT 44.9 08/30/2022     07/03/2025     08/30/2022     BMP:   Lab Results   Component Value Date     07/03/2025     08/30/2022    POTASSIUM 4.1 07/03/2025    POTASSIUM 4.4 08/30/2022    CHLORIDE 103 07/03/2025    CHLORIDE 104 08/30/2022    CO2 25 07/03/2025    CO2 28 08/30/2022    BUN 15.1 07/03/2025    BUN 12.5 08/30/2022    CR 0.65 07/03/2025    CR 0.72 08/30/2022     (H) 07/03/2025     (H) 07/03/2025     COAGS:   Lab Results   Component Value Date    PTT 26 07/03/2025    INR 0.96 07/03/2025     POC:   Lab Results   Component Value Date    HCG Negative 04/24/2020     HEPATIC:   Lab Results   Component Value Date    ALBUMIN 3.2 (L) 04/24/2020    PROTTOTAL 6.7 (L) 04/24/2020     (H) 04/24/2020     (H) 04/24/2020    ALKPHOS 297 (H) 04/24/2020    BILITOTAL 0.6 04/24/2020     OTHER:  "  Lab Results   Component Value Date    ZENA 8.9 07/03/2025    LIPASE 37 (L) 04/22/2020       Anesthesia Plan    ASA Status:  2      NPO Status: NPO Appropriate   Anesthesia Type: General.  Airway: oral.  Induction: intravenous.  Maintenance: Inhalation.        Consents    Anesthesia Plan(s) and associated risks, benefits, and realistic alternatives discussed. Questions answered and patient/representative(s) expressed understanding.     - Discussed: anesthesiologist     - Discussed with:                Postoperative Care    Pain management: plan for postoperative opioid use.     Comments:                   Tommy Maldonado MD    I have reviewed the pertinent notes and labs in the chart from the past 30 days and (re)examined the patient.  Any updates or changes from those notes are reflected in this note.    Clinically Significant Risk Factors Present on Admission                             # Obesity: Estimated body mass index is 33.27 kg/m  as calculated from the following:    Height as of 6/11/25: 1.575 m (5' 2\").    Weight as of this encounter: 82.5 kg (181 lb 14.4 oz).                    "

## 2025-07-03 NOTE — ANESTHESIA CARE TRANSFER NOTE
Patient: Kacy Herrera    Procedure: Procedure(s):  Right sided Lumbar 5 to Sacral 1 hemilaminectomy, possible medial facetectomy, microdiscectomy       Diagnosis: Lumbar radiculopathy [M54.16]  Diagnosis Additional Information: No value filed.    Anesthesia Type:   General     Note:    Oropharynx: oropharynx clear of all foreign objects and spontaneously breathing  Level of Consciousness: drowsy  Oxygen Supplementation: face mask  Level of Supplemental Oxygen (L/min / FiO2): 6  Independent Airway: airway patency satisfactory and stable  Dentition: dentition unchanged  Vital Signs Stable: post-procedure vital signs reviewed and stable  Report to RN Given: handoff report given  Patient transferred to: PACU    Handoff Report: Identifed the Patient, Identified the Reponsible Provider, Reviewed the pertinent medical history, Discussed the surgical course, Reviewed Intra-OP anesthesia mangement and issues during anesthesia, Set expectations for post-procedure period and Allowed opportunity for questions and acknowledgement of understanding      Vitals:  Vitals Value Taken Time   /88 07/03/25 11:03   Temp 37.0    Pulse 115 07/03/25 11:05   Resp 19 07/03/25 11:05   SpO2 95 % 07/03/25 11:05   Vitals shown include unfiled device data.    Electronically Signed By: FINA Sagastume CRNA  July 3, 2025  11:07 AM

## 2025-07-03 NOTE — PHARMACY-ADMISSION MEDICATION HISTORY
Pharmacist Admission Medication History    Admission medication history is complete. The information provided in this note is only as accurate as the sources available at the time of the update.    Information Source(s): Patient, Clinic records, and CareEverywhere/SureScripts via in-person    Allergies reviewed with patient and updates made in EHR: yes    Medication History Completed By: Carlota Lo RPH 7/3/2025 6:53 AM    PTA Med List   Medication Sig Last Dose/Taking    acetaminophen (TYLENOL) 325 MG tablet Take 2 tablets (650 mg) by mouth every 4 hours as needed for other (mild pain) Past Week    levonorgestrel (MIRENA) 52 MG (20 mcg/day) IUD 1 each by Intrauterine route. Taking    venlafaxine (EFFEXOR-XR) 150 MG 24 hr capsule Take 150 mg by mouth every evening. 7/2/2025 Bedtime

## 2025-07-04 ENCOUNTER — APPOINTMENT (OUTPATIENT)
Dept: PHYSICAL THERAPY | Facility: HOSPITAL | Age: 55
End: 2025-07-04
Payer: COMMERCIAL

## 2025-07-04 VITALS
OXYGEN SATURATION: 98 % | BODY MASS INDEX: 33.27 KG/M2 | DIASTOLIC BLOOD PRESSURE: 62 MMHG | HEART RATE: 86 BPM | TEMPERATURE: 98.4 F | RESPIRATION RATE: 18 BRPM | SYSTOLIC BLOOD PRESSURE: 118 MMHG | WEIGHT: 181.9 LBS

## 2025-07-04 VITALS
HEART RATE: 85 BPM | DIASTOLIC BLOOD PRESSURE: 87 MMHG | TEMPERATURE: 98.9 F | OXYGEN SATURATION: 99 % | BODY MASS INDEX: 33.27 KG/M2 | RESPIRATION RATE: 20 BRPM | WEIGHT: 181.9 LBS | SYSTOLIC BLOOD PRESSURE: 153 MMHG

## 2025-07-04 PROCEDURE — 97110 THERAPEUTIC EXERCISES: CPT | Mod: GP

## 2025-07-04 PROCEDURE — 97161 PT EVAL LOW COMPLEX 20 MIN: CPT | Mod: GP

## 2025-07-04 PROCEDURE — 97530 THERAPEUTIC ACTIVITIES: CPT | Mod: GP

## 2025-07-04 PROCEDURE — 250N000013 HC RX MED GY IP 250 OP 250 PS 637

## 2025-07-04 PROCEDURE — 250N000011 HC RX IP 250 OP 636

## 2025-07-04 PROCEDURE — 250N000013 HC RX MED GY IP 250 OP 250 PS 637: Performed by: NURSE PRACTITIONER

## 2025-07-04 PROCEDURE — 250N000012 HC RX MED GY IP 250 OP 636 PS 637: Performed by: NURSE PRACTITIONER

## 2025-07-04 RX ORDER — METHYLPREDNISOLONE 4 MG/1
4 TABLET ORAL ONCE
Status: COMPLETED | OUTPATIENT
Start: 2025-07-04 | End: 2025-07-04

## 2025-07-04 RX ORDER — METHYLPREDNISOLONE 4 MG/1
4 TABLET ORAL
Status: DISCONTINUED | OUTPATIENT
Start: 2025-07-05 | End: 2025-07-04 | Stop reason: HOSPADM

## 2025-07-04 RX ORDER — METHYLPREDNISOLONE 4 MG/1
8 TABLET ORAL AT BEDTIME
Status: DISCONTINUED | OUTPATIENT
Start: 2025-07-04 | End: 2025-07-04 | Stop reason: HOSPADM

## 2025-07-04 RX ORDER — METHYLPREDNISOLONE 4 MG/1
4 TABLET ORAL AT BEDTIME
Status: DISCONTINUED | OUTPATIENT
Start: 2025-07-06 | End: 2025-07-04 | Stop reason: HOSPADM

## 2025-07-04 RX ORDER — METHYLPREDNISOLONE 4 MG/1
8 TABLET ORAL ONCE
Status: COMPLETED | OUTPATIENT
Start: 2025-07-04 | End: 2025-07-04

## 2025-07-04 RX ORDER — AMOXICILLIN 250 MG
1 CAPSULE ORAL 2 TIMES DAILY PRN
Qty: 20 TABLET | Refills: 0 | Status: SHIPPED | OUTPATIENT
Start: 2025-07-04

## 2025-07-04 RX ORDER — METHOCARBAMOL 750 MG/1
750 TABLET, FILM COATED ORAL EVERY 6 HOURS PRN
Qty: 40 TABLET | Refills: 0 | Status: SHIPPED | OUTPATIENT
Start: 2025-07-04

## 2025-07-04 RX ORDER — METHYLPREDNISOLONE 4 MG/1
TABLET ORAL
Qty: 21 TABLET | Refills: 0 | Status: SHIPPED | OUTPATIENT
Start: 2025-07-04

## 2025-07-04 RX ORDER — HYDROMORPHONE HYDROCHLORIDE 2 MG/1
2-4 TABLET ORAL EVERY 4 HOURS PRN
Qty: 40 TABLET | Refills: 0 | Status: SHIPPED | OUTPATIENT
Start: 2025-07-04

## 2025-07-04 RX ORDER — METHYLPREDNISOLONE 4 MG/1
4 TABLET ORAL ONCE
Status: DISCONTINUED | OUTPATIENT
Start: 2025-07-04 | End: 2025-07-04 | Stop reason: HOSPADM

## 2025-07-04 RX ADMIN — ACETAMINOPHEN 975 MG: 325 TABLET ORAL at 00:49

## 2025-07-04 RX ADMIN — DOCUSATE SODIUM 50 MG AND SENNOSIDES 8.6 MG 1 TABLET: 8.6; 5 TABLET, FILM COATED ORAL at 08:46

## 2025-07-04 RX ADMIN — CEFAZOLIN SODIUM 2 G: 2 SOLUTION INTRAVENOUS at 00:58

## 2025-07-04 RX ADMIN — METHOCARBAMOL 750 MG: 750 TABLET ORAL at 08:46

## 2025-07-04 RX ADMIN — ACETAMINOPHEN 975 MG: 325 TABLET ORAL at 08:46

## 2025-07-04 RX ADMIN — METHYLPREDNISOLONE 4 MG: 4 TABLET ORAL at 12:41

## 2025-07-04 RX ADMIN — POLYETHYLENE GLYCOL 3350 17 G: 17 POWDER, FOR SOLUTION ORAL at 08:47

## 2025-07-04 RX ADMIN — METHYLPREDNISOLONE 8 MG: 4 TABLET ORAL at 10:37

## 2025-07-04 RX ADMIN — CEFAZOLIN SODIUM 2 G: 2 SOLUTION INTRAVENOUS at 08:41

## 2025-07-04 RX ADMIN — HYDROMORPHONE HYDROCHLORIDE 4 MG: 4 TABLET ORAL at 00:57

## 2025-07-04 RX ADMIN — THERA TABS 1 TABLET: TAB at 08:47

## 2025-07-04 ASSESSMENT — ACTIVITIES OF DAILY LIVING (ADL)
ADLS_ACUITY_SCORE: 31
ADLS_ACUITY_SCORE: 33
ADLS_ACUITY_SCORE: 31
ADLS_ACUITY_SCORE: 33
ADLS_ACUITY_SCORE: 33
ADLS_ACUITY_SCORE: 31
ADLS_ACUITY_SCORE: 33
ADLS_ACUITY_SCORE: 31
ADLS_ACUITY_SCORE: 33
ADLS_ACUITY_SCORE: 31
ADLS_ACUITY_SCORE: 31

## 2025-07-04 NOTE — PLAN OF CARE
Occupational Therapy: Defer - Orders received. Chart reviewed and discussed with care team.?Occupational Therapy not indicated.?Per discussion with PT, patient is completing ADLs SBA in room and appropriate for single rehab discipline to follow. No IP OT needs identified. PT to continue to follow. Defer discharge recommendations to PT and medical team.?Will complete orders. Please re-order OT if mobility status changes or further needs arise.

## 2025-07-04 NOTE — PLAN OF CARE
Physical Therapy Discharge Summary    Reason for therapy discharge:    All goals and outcomes met, no further needs identified.    Progress towards therapy goal(s). See goals on Care Plan in [facility] electronic health record for goal details.  Goals met    Therapy recommendation(s):    Continue home exercise program.

## 2025-07-04 NOTE — PLAN OF CARE
Problem: Adult Inpatient Plan of Care  Goal: Optimal Comfort and Wellbeing  Intervention: Monitor Pain and Promote Comfort  Recent Flowsheet Documentation  Taken 7/4/2025 0830 by Mary Prescott RN  Pain Management Interventions:   medication (see MAR)   cold applied   Goal Outcome Evaluation: Pt alert and oriented, reported nerve ain of 6/10 on lower leg, Medrol 8 mg and 4 mg given respectively, pt discharging home,  here for , discharge instructions given, BASSAM drain taking off with 35 ml output, surgical site dressing  dried and intact with little old drainage. Discharge instruction given, pt verbalized understanding of discharge instruction, belongings with the pt.

## 2025-07-04 NOTE — OP NOTE
"PATIENT NAME: Kacy Herrera  PATIENT MRN: [id number]  PATIENT ACCOUNT: [id number]  PATIENT YOB: 1970    NEUROSURGERY OPERATIVE REPORT     DATE OF SURGERY: 07/04/25     PREOPERATIVE DIAGNOSIS:  1. Right L5-S1 disc herniation resulting in right L5 radiculopathy     POSTOPERATIVE DIAGNOSIS:  1. Right L5-S1 disc herniation resulting in right L5 radiculopathy     PROCEDURES PERFORMED:  1. Right sided L5-S1 hemilaminectomy, medial facetectomy and microdiscectomy     STAFF SURGEON: Ryan Atkinson MD     ASSISTANT: Michelle Saleh CNP     ANESTHESIA: General endotracheal anesthesia     ESTIMATED BLOOD LOSS: 30 mL     IMPLANTS: none    EXPLANTS: none     DRAINS: 10 British flat BASSAM     FINDINGS:   Large paracentral L5-S1 disc herniation with partial calcification and rostral migration of disc fragments leading to compression of the right L5 nerve root. Right L5 nerve root and thecal sac well decompressed by end of operation. No CSF leak throughout the operation     COMPLICATIONS: none immediate     INDICATIONS: Kacy Herrera is a 55 year old female with a history of a prior left L5-S1 microdiscectomy. She subsequently has developed a new right L5 radiculopathy and was found to have a new paracentral disc extrusion compressing her right L5 nerve root on MRI.     Her new symptoms began after bending over and \"being knocked over by a wave\" while in [place]. She has had 2 epidural steroid injections. She had failed conservative measures, and as such, the patient was offered the above stated operation. She provided written and verbal consent to proceed after a discussion of the risks, benefits, and alternatives.      DESCRIPTION OF PROCEDURE:  Kacy Herrera was brought to the operating room. Her identity was verified. The patient was transferred in the prone position to the operating table after vascular access was established and general endotracheal anesthesia was obtained. Both arms were placed on arm " boards. The patient's head was placed on a proneview. Preoperative antibiotics were administered. The patient was cleaned, prepared, and draped in sterile fashion after obtaining an X-ray for localization, which demonstrated that her prior surgical site incision correlated to the appropriate vertebral body. Timeout was performed. Local anesthetic was injected.     The patient's prior incision at her lumbar midline was incised. I used monopolar cautery to dissect down to the spinous process. Next, a subperiosteal dissection on the right side exposing the lamina of L5 and S1. Imaging demonstrated we were at the appropriate segment.     A sterile operating microscope was then used to perform the remainder of the operation. Next, a high speed matchstick drill bit was used to drill the L5 lamina. Ligamentum flavum was identified and a plane was established between the thecal sac and ligamentum using a ball hook or upgoing curette as deemed appropriate. Once a plane was made, Kerrison rongeurs were used to resect ligamentum. At this time, another localizing X-ray was obtained that we were at the level of the disc space. I could identify the thecal sac and also a disc herniation emanating from the L5-S1 disc space. Additional lamina and ligamentum resection was performed using the drill and Kerrison rongeurs, respectively in a rostral fashion until we were at the level of the L5 pedicle. Once the midline decompression was performed, I gradually expanded the laminectomy laterally into the medial facet until I could safely identify the L5 nerve root. There was a ventral protrusion identified leading to compression of both the thecal sac and L5 root. A D'errico instrument was placed to protect the thecal sac. The disc herniation was incised using a #15 blade. The pituitary rongeurs were then used to perform the disc resection in fragments. I did enter the disc space at L5-S1 as well to take additional disc material to  "mitigate the risk of early re-herniation. I was able to visually verify that no additional disc fragments were compressive onto the thecal sac or the right sided outgoing L5 nerve root using a ball hook at the end of the operation. A ball hook was also able to verify that the lateral recess was decompressed as well as the neuro-foramen and L5-S1. Epidural hemostasis was obtained using bipolar cautery and SurgiFlo. Gel foam soaked thrombin was placed onto the neural structures.     Next, a 10 [place] flat BASSAM drain was placed in the sub-fascial space and tunneled under her skin. Thereafter, we closed in layers with 0-0 Vicryl for the fascia, 2-0 Vicryl for the sub-dermal layer, and 3-0 Monocryl for the skin. The drain was secured to the skin using nylon suture. A Zafar Shaikh bulb was attached to the drain catheter and placed to suction.  Sterile dressings were applied. The patient was extubated and returned to the post-operative anesthesia care unit without incident. At the end of the procedure, sponge and needle counts were correct. Estimated blood loss totaled 30 ml.    Michelle Saleh CNP provided assistance with preoperative positioning, prepping, and draping of the patient. The assistant provided vital operative assistance with retraction using instruments best providing the necessary exposure and visualization for the case, manipulation of tissues to achieve hemostasis, suction for visualization and assisted in wound closure. Postoperatively she assisted in the transfer of the patient off of the operative table and transition into the post anesthesia care unit with transition of care being made to the anesthesiologist.     Ryan \"Florin\" MD [place]    of Neurosurgery, [facility]      "

## 2025-07-04 NOTE — DISCHARGE SUMMARY
[facility]    Discharge Summary   [facility]    Date of Admission:  7/3/2025  Date of Discharge:  7/4/2025  Discharging Provider: Shayy De Anda NP  Date of Service (when I saw the patient): 07/04/25    History of Present Illness   Kcay Herrera is a 55 year old female with history of a prior left L5-S1 microdiscectomy. She developed a new right L5 radiculopathy and was found to have a new paracentral disc extrusion compressing her right L5 nerve root on MRI. Surgical intervention was recommended.     Hospital Course   Kacy Herrrea was admitted on 7/3/2025 and underwent right sided L5-S1 hemilaminectomy, medial facetectomy and microdiscectomy  with Dr. Atkinson.     Patient reports post op back soreness along incision.   Continued right posterior leg pain, similar to pre op, denies any new or worsening symptoms.   Pre op EHL and DF weakness improving.   Incision CDI.  Drain removed today.   PT/OT recommends discharge to home.   Patient would like to discharge home today.     Assessment: POD1  right sided L5-S1 hemilaminectomy, medial facetectomy and microdiscectomy  with Dr. Atkinson.     Plan:   - Discharge to home  - Routine post op care plan  - Routine wound care and activity restrictions  - Medrol dosepak and pain medications prescribed  - Follow up with NSGY clinic as scheduled     I have discussed the following assessment and plan with Dr. Atkinson.     Shayy De Anda, CNP  [facility]  Clinic phone number: [phone]  Securely message or page via Epic Secure Chat, Appboy, or TapMetrics     Code Status   Full Code    Primary Care Physician   Radha Ewing    Mental status:  Awake, alert, oriented x 3, speech is fluent, following commands  Motor:    Iliopsoas  (hip flexion)               Right: 5/5  Left:  5/5  Quadriceps  (knee extension)       Right:  5/5  Left:  5/5  Hamstrings  (knee flexion)            Right:  5/5  Left:  5/5  Gastroc Soleus  (PF)                           Right:  5/5  Left:  5/5  Tibialis Ant  (DF)                          Right:  4+/5  Left:  5/5, improving compared to pre op   EHL                          Right:  4+/5  Left:  5-/5, improving compared to pre op   Sensation:  Intact to light touch BLE.   Incision: Clean, dry, intact. No swelling, redness, warmth, or drainage.     Time Spent on this Encounter   Shayy JAY NP, personally saw the patient today and spent less than or equal to 30 minutes discharging this patient.    Discharge Disposition   Discharged to home  Condition at discharge: Stable    Consultations This Hospital Stay   PHYSICAL THERAPY ADULT IP CONSULT  OCCUPATIONAL THERAPY ADULT IP CONSULT    Discharge Orders      Reason for your hospital stay    Right sided L5-S1 hemilaminectomy, medial facetectomy and microdiscectomy     Activity    Limit lifting to 10 pounds and limit bending/twisting until follow up visit. Ok to walk as tolerated, avoid bed rest and prolonged sitting. No contact sports or high impact activities until  follow up visit.     Do not take NSAIDS (Ibuprofen, Advil, Aleve, Naproxen, etc) until follow-up visit. Do NOT drive while taking narcotic pain medication.     Follow Up    Please follow up at [facility] in 2 weeks .  You may call the clinic with any scheduling questions or concerns.    [facility]  Tel [phone]     Wound care and dressings    Keep your incision clean and dry. Okay to remove dressing on post-op day 2 and shower on post-op day 3. Okay for water to run over incision and gently pat dry. Do not scrub incision. No bathing, swimming, or submerging incision under water until follow-up visit. Call clinic or go to the ER with any incision drainage or swelling. Follow-up in clinic at 2 weeks post op for incision check.     Diet    Follow this diet upon discharge: Advance to a regular diet as tolerated     Discharge Medications   Current  Discharge Medication List        START taking these medications    Details   HYDROmorphone (DILAUDID) 2 MG tablet Take 1-2 tablets (2-4 mg) by mouth every 4 hours as needed for pain.  Qty: 40 tablet, Refills: 0    Associated Diagnoses: S/P lumbar laminectomy      methocarbamol (ROBAXIN) 750 MG tablet Take 1 tablet (750 mg) by mouth every 6 hours as needed for muscle spasms.  Qty: 40 tablet, Refills: 0    Associated Diagnoses: S/P lumbar laminectomy      methylPREDNISolone (MEDROL DOSEPAK) 4 MG tablet therapy pack Follow Package Directions  Qty: 21 tablet, Refills: 0    Associated Diagnoses: S/P lumbar laminectomy      senna-docusate (SENOKOT-S/PERICOLACE) 8.6-50 MG tablet Take 1 tablet by mouth 2 times daily as needed for constipation.  Qty: 20 tablet, Refills: 0    Associated Diagnoses: S/P lumbar laminectomy           CONTINUE these medications which have NOT CHANGED    Details   acetaminophen (TYLENOL) 325 MG tablet Take 2 tablets (650 mg) by mouth every 4 hours as needed for other (mild pain)  Qty: 100 tablet, Refills: 0    Associated Diagnoses: S/P revision of total knee, left      levonorgestrel (MIRENA) 52 MG (20 mcg/day) IUD 1 each by Intrauterine route.      venlafaxine (EFFEXOR-XR) 150 MG 24 hr capsule Take 150 mg by mouth every evening.           Allergies   Allergies   Allergen Reactions    Oxycodone Itching    Zithromax [Azithromycin] Hives

## 2025-07-04 NOTE — PLAN OF CARE
Patient alert and oriented this shift. Patient had complaints of surgical pain to lower right back/buttock area, with shooting/burning pain down the back of her right leg.  Denies numbness or tingling. Strength is equal in both legs. Patient was able to ambulate in the hallway this evening. Voiding urine well, had 2  bladders scans with less than 30 ml.  IV cefazolin given as ordered. PRN 0.2 mg of Iv dilaudid given x 1 along with scheduled tylenol and PRN robaxin and was only somewhat helpful, then PRN oral dilaudid 2 mg was given around 1950 and was helpful an allowed patient to sleep.  Cold packs were also used off and on in the shift. Gianna Oneal RN     Problem: Adult Inpatient Plan of Care  Goal: Absence of Hospital-Acquired Illness or Injury  Outcome: Progressing  Intervention: Identify and Manage Fall Risk  Recent Flowsheet Documentation  Taken 7/3/2025 1645 by Gianna Oneal RN  Safety Promotion/Fall Prevention:   activity supervised   lighting adjusted   nonskid shoes/slippers when out of bed   clutter free environment maintained  Intervention: Prevent Skin Injury  Recent Flowsheet Documentation  Taken 7/3/2025 1645 by Gianna Oneal RN  Body Position: position changed independently  Intervention: Prevent and Manage VTE (Venous Thromboembolism) Risk  Recent Flowsheet Documentation  Taken 7/3/2025 1645 by Gianna Oneal RN  VTE Prevention/Management: SCDs on (sequential compression devices)     Problem: Adult Inpatient Plan of Care  Goal: Optimal Comfort and Wellbeing  Intervention: Monitor Pain and Promote Comfort  Recent Flowsheet Documentation  Taken 7/3/2025 1953 by Gianna Oneal RN  Pain Management Interventions:   medication (see MAR)   cold applied  Taken 7/3/2025 1651 by Gianna Oneal RN  Pain Management Interventions:   medication (see MAR)   cold applied     Problem: Skin Injury Risk Increased  Goal: Skin Health and Integrity  Intervention: Optimize Skin  Protection  Recent Flowsheet Documentation  Taken 7/3/2025 1645 by Gianna Oneal RN  Activity Management: activity adjusted per tolerance  Head of Bed (HOB) Positioning: HOB at 30-45 degrees     Problem: Pain Acute  Goal: Optimal Pain Control and Function  Intervention: Develop Pain Management Plan  Recent Flowsheet Documentation  Taken 7/3/2025 1953 by Gianna Oneal RN  Pain Management Interventions:   medication (see MAR)   cold applied  Taken 7/3/2025 1651 by Gianna Oneal RN  Pain Management Interventions:   medication (see MAR)   cold applied     Problem: Pain Acute  Goal: Optimal Pain Control and Function  Intervention: Prevent or Manage Pain  Recent Flowsheet Documentation  Taken 7/3/2025 1645 by Gianna Oneal RN  Medication Review/Management: medications reviewed     Problem: Spinal Surgery  Goal: Optimal Pain Control and Function  Intervention: Prevent or Manage Pain  Recent Flowsheet Documentation  Taken 7/3/2025 1953 by Gianna Oneal RN  Pain Management Interventions:   medication (see MAR)   cold applied  Taken 7/3/2025 1651 by Gianna Oneal RN  Pain Management Interventions:   medication (see MAR)   cold applied   Goal Outcome Evaluation:

## 2025-07-04 NOTE — PLAN OF CARE
Problem: Pain Acute  Goal: Optimal Pain Control and Function  Outcome: Not Progressing     Problem: Spinal Surgery  Goal: Optimal Coping with Surgery  Outcome: Progressing     Goal Outcome Evaluation:    POD 1: L5-S1 hemilaminectomy    Surgical dressing intact with small drainage. BASSAM drain 30 ml bloody output. Continues to have burning, shooting pain down right leg. Denies numbness. Full strength in BLE. Scheduled Tylenol and PRN dilaudid ineffective. Ice packs utilized. Ambulated to bathroom with SBA. Able to void without difficulty.

## 2025-07-04 NOTE — PROGRESS NOTES
07/04/25 0930   Appointment Info   Signing Clinician's Name / Credentials (PT) Danna Thomson PT   Rehab Comments (PT) Patient in bed and returned to bed after PT.   Living Environment   People in Home spouse   Current Living Arrangements house   Home Accessibility stairs to enter home   Number of Stairs, Main Entrance 2   Stair Railings, Main Entrance other (see comments)  (no rail but has something to hang onto if needed)   Transportation Anticipated family or friend will provide   Self-Care   Usual Activity Tolerance good   Current Activity Tolerance good   Equipment Currently Used at Home none   Fall history within last six months no   Activity/Exercise/Self-Care Comment Patient independent with mobility and ADL /IADL.   General Information   Onset of Illness/Injury or Date of Surgery 07/03/25   Referring Physician Ryan Atkinson   Patient/Family Therapy Goals Statement (PT) recover to go to [place] in September   Pertinent History of Current Problem (include personal factors and/or comorbidities that impact the POC) Admitted for R L5-S1 laminectomy   Existing Precautions/Restrictions spinal   Weight-Bearing Status - LLE weight-bearing as tolerated   Weight-Bearing Status - RLE weight-bearing as tolerated   Cognition   Affect/Mental Status (Cognition) WNL   Orientation Status (Cognition) oriented x 4   Follows Commands (Cognition) WNL   Pain Assessment   Patient Currently in Pain Yes, see Vital Sign flowsheet   Range of Motion (ROM)   Range of Motion ROM is WNL   Strength (Manual Muscle Testing)   Strength (Manual Muscle Testing) strength is WFL   Bed Mobility   Supine-Sit [place] (Bed Mobility) supervision;verbal cues   Sit-Supine [place] (Bed Mobility) supervision;verbal cues   Transfers   Transfers no deficits identified   Gait/Stairs (Locomotion)   [place] Level (Gait) supervision   Assistive Device (Gait) other (see comments)  (none)   Distance in Feet (Gait) 250   Pattern (Gait) step-through    Maintains Weight-bearing Status (Gait) able to maintain   Negotiation (Stairs) number of steps   [place] Level (Stairs) supervision   Handrail Location (Stairs) right side (ascending);left side (descending)   Number of Steps (Stairs) 4   Ascending Technique (Stairs) step-over-step   Descending Technique (Stairs) step-over-step   Clinical Impression   Criteria for Skilled Therapeutic Intervention Evaluation only   PT Diagnosis (PT) impaired functional mobility   Influenced by the following impairments surgery,pain   Clinical Presentation (PT Evaluation Complexity) stable   Clinical Presentation Rationale patient presents as med diagnosed   Clinical Decision Making (Complexity) low complexity   Planned Therapy Interventions (PT) bed mobility training;patient/family education;stair training   Risk & Benefits of therapy have been explained evaluation/treatment results reviewed;patient;participants included;care plan/treatment goals reviewed   PT Total Evaluation Time   PT Eval, Low Complexity Minutes (12539) 12   Physical Therapy Goals   PT Frequency One time eval and treatment only   PT Predicted Duration/Target Date for Goal Attainment 07/04/25   PT Goals Bed Mobility;Transfers;Gait;Stairs;PT Goal 1   PT: Bed Mobility Independent;Supine to/from sit;Within precautions;Goal Met   PT: Transfers Independent;Sit to/from stand;Goal Met   PT: Gait Supervision/stand-by assist;Greater than 200 feet;Goal Met   PT: Stairs Modified independent;4 stairs;Goal Met   PT: Goal 1 Patient will be independent with HEP with handout -goal met   Therapeutic Procedure/Exercise   Ther. Procedure: strength, endurance, ROM, flexibillity Minutes (92878) 8   Symptoms Noted During/After Treatment increased pain   Treatment Detail/Skilled Intervention Patient performed standing reggie L/E ex x10 reps.Issued HEP   Therapeutic Activity   Therapeutic Activities: dynamic activities to improve functional performance Minutes (44402) 10   Symptoms  Noted During/After Treatment None   Treatment Detail/Skilled Intervention Toilet transfers with SBA /I.Instructed and practiced  log roll to get into/out of bed.Discussed spinal precautionsAmbulated additional 250 feet with SBA.   PT Discharge Planning   PT Plan d/c PT -pt met goals   PT Discharge Recommendation (DC Rec) home with assist   PT Rationale for DC Rec Doing well ,was independent prior.Spouse able to assist as needed.   PT Brief overview of current status SBA mobility and amb total 500 feet   PT Total Distance Amb During Session (feet) 500   PT Equipment Needed at Discharge   (none needed)   Physical Therapy Time and Intention   Timed Code Treatment Minutes 18   Total Session Time (sum of timed and untimed services) 30

## 2025-07-08 ENCOUNTER — TELEPHONE (OUTPATIENT)
Dept: NEUROSURGERY | Facility: CLINIC | Age: 55
End: 2025-07-08
Payer: COMMERCIAL

## 2025-07-08 DIAGNOSIS — M54.16 LUMBAR RADICULOPATHY: Primary | ICD-10-CM

## 2025-07-08 RX ORDER — ONDANSETRON 4 MG/1
4 TABLET, FILM COATED ORAL EVERY 6 HOURS PRN
Qty: 12 TABLET | Refills: 0 | Status: SHIPPED | OUTPATIENT
Start: 2025-07-08

## 2025-07-08 NOTE — TELEPHONE ENCOUNTER
Spoke with Kacy at length, discussed her current symptoms and concerns.   Kacy reports ongoing burning pain of right lateral thigh and ankle. She endorses persistent nausea despite not taking Dilaudid. On Tylenol and Robaxin prn. Denies sensory or motor issues in LE. Denies incontinence or saddle anesthesia.  Recommended Zofran for nausea, reminded about hydration, ice/heat, and positioning.  Will connect again tomorrow to check in.  Marjan Barba RN, CNRN

## 2025-07-08 NOTE — TELEPHONE ENCOUNTER
Pt returned call, reached out via secure chat and didn't get a response.  Please try calling Pt back.

## 2025-07-08 NOTE — TELEPHONE ENCOUNTER
Health Call Center    Phone Message    May a detailed message be left on voicemail: yes     Reason for Call: Other: Patient called she is not feeling well, she had surgery on July 3rd. She has sever nausea and just feeling horrible. She wants to speak to someone to make sure this is normal. Please review and call back.      Action Taken: Message routed to:  Other: [facility]     Travel Screening: Not Applicable     Date of Service:

## 2025-07-18 ENCOUNTER — HOSPITAL ENCOUNTER (EMERGENCY)
Age: 55
Discharge: HOME | End: 2025-07-18
Payer: COMMERCIAL

## 2025-07-18 VITALS
DIASTOLIC BLOOD PRESSURE: 92 MMHG | TEMPERATURE: 97.4 F | SYSTOLIC BLOOD PRESSURE: 164 MMHG | OXYGEN SATURATION: 96 % | RESPIRATION RATE: 22 BRPM | HEART RATE: 88 BPM

## 2025-07-18 VITALS — TEMPERATURE: 97.4 F

## 2025-07-18 VITALS — BODY MASS INDEX: 32.6 KG/M2

## 2025-07-18 VITALS
OXYGEN SATURATION: 96 % | SYSTOLIC BLOOD PRESSURE: 155 MMHG | RESPIRATION RATE: 22 BRPM | HEART RATE: 81 BPM | DIASTOLIC BLOOD PRESSURE: 85 MMHG | TEMPERATURE: 98.06 F

## 2025-07-18 VITALS — OXYGEN SATURATION: 96 %

## 2025-07-18 DIAGNOSIS — M54.16: Primary | ICD-10-CM

## 2025-07-18 PROCEDURE — 96372 THER/PROPH/DIAG INJ SC/IM: CPT

## 2025-07-18 PROCEDURE — A9270 NON-COVERED ITEM OR SERVICE: HCPCS

## 2025-07-18 PROCEDURE — 99283 EMERGENCY DEPT VISIT LOW MDM: CPT

## 2025-07-18 PROCEDURE — 94761 N-INVAS EAR/PLS OXIMETRY MLT: CPT

## 2025-07-19 ENCOUNTER — HOSPITAL ENCOUNTER (OUTPATIENT)
Dept: MRI IMAGING | Facility: CLINIC | Age: 55
Discharge: HOME | End: 2025-07-19
Attending: STUDENT IN AN ORGANIZED HEALTH CARE EDUCATION/TRAINING PROGRAM | Admitting: STUDENT IN AN ORGANIZED HEALTH CARE EDUCATION/TRAINING PROGRAM
Payer: COMMERCIAL

## 2025-07-19 DIAGNOSIS — Z98.890 S/P SPINAL SURGERY: ICD-10-CM

## 2025-07-19 DIAGNOSIS — M54.16 LUMBAR RADICULOPATHY: ICD-10-CM

## 2025-07-19 PROCEDURE — 72158 MRI LUMBAR SPINE W/O & W/DYE: CPT

## 2025-07-19 PROCEDURE — 255N000002 HC RX 255 OP 636: Performed by: STUDENT IN AN ORGANIZED HEALTH CARE EDUCATION/TRAINING PROGRAM

## 2025-07-19 PROCEDURE — A9585 GADOBUTROL INJECTION: HCPCS | Performed by: STUDENT IN AN ORGANIZED HEALTH CARE EDUCATION/TRAINING PROGRAM

## 2025-07-19 RX ORDER — GADOBUTROL 604.72 MG/ML
8 INJECTION INTRAVENOUS ONCE
Status: COMPLETED | OUTPATIENT
Start: 2025-07-19 | End: 2025-07-19

## 2025-07-19 RX ADMIN — GADOBUTROL 8 ML: 604.72 INJECTION INTRAVENOUS at 07:54

## 2025-07-20 ENCOUNTER — TELEPHONE (OUTPATIENT)
Dept: NEUROLOGY | Facility: CLINIC | Age: 55
End: 2025-07-20
Payer: COMMERCIAL

## 2025-07-20 ENCOUNTER — HOSPITAL ENCOUNTER (INPATIENT)
Facility: CLINIC | Age: 55
LOS: 2 days | Discharge: HOME | End: 2025-07-22
Attending: EMERGENCY MEDICINE | Admitting: HOSPITALIST
Payer: COMMERCIAL

## 2025-07-20 DIAGNOSIS — M54.41 ACUTE BILATERAL LOW BACK PAIN WITH RIGHT-SIDED SCIATICA: ICD-10-CM

## 2025-07-20 DIAGNOSIS — M51.26 RECURRENT HERNIATION OF LUMBAR DISC: Primary | ICD-10-CM

## 2025-07-20 DIAGNOSIS — Z98.890 S/P LUMBAR LAMINECTOMY: Primary | ICD-10-CM

## 2025-07-20 DIAGNOSIS — Z98.890 S/P LUMBAR DISCECTOMY: ICD-10-CM

## 2025-07-20 DIAGNOSIS — R52 INTRACTABLE PAIN: ICD-10-CM

## 2025-07-20 LAB
ANION GAP SERPL CALCULATED.3IONS-SCNC: 11 MMOL/L (ref 7–15)
BASOPHILS # BLD AUTO: 0 10E3/UL (ref 0–0.2)
BASOPHILS NFR BLD AUTO: 0 %
BUN SERPL-MCNC: 16.2 MG/DL (ref 6–20)
CALCIUM SERPL-MCNC: 9.4 MG/DL (ref 8.8–10.4)
CHLORIDE SERPL-SCNC: 97 MMOL/L (ref 98–107)
CREAT SERPL-MCNC: 0.7 MG/DL (ref 0.51–0.95)
EGFRCR SERPLBLD CKD-EPI 2021: >90 ML/MIN/1.73M2
EOSINOPHIL # BLD AUTO: 0 10E3/UL (ref 0–0.7)
EOSINOPHIL NFR BLD AUTO: 0 %
ERYTHROCYTE [DISTWIDTH] IN BLOOD BY AUTOMATED COUNT: 14.5 % (ref 10–15)
GLUCOSE SERPL-MCNC: 137 MG/DL (ref 70–99)
HCO3 SERPL-SCNC: 29 MMOL/L (ref 22–29)
HCT VFR BLD AUTO: 39.8 % (ref 35–47)
HGB BLD-MCNC: 13.2 G/DL (ref 11.7–15.7)
HOLD SPECIMEN: NORMAL
IMM GRANULOCYTES # BLD: 0.1 10E3/UL
IMM GRANULOCYTES NFR BLD: 1 %
LYMPHOCYTES # BLD AUTO: 1.3 10E3/UL (ref 0.8–5.3)
LYMPHOCYTES NFR BLD AUTO: 10 %
MCH RBC QN AUTO: 30.6 PG (ref 26.5–33)
MCHC RBC AUTO-ENTMCNC: 33.2 G/DL (ref 31.5–36.5)
MCV RBC AUTO: 92 FL (ref 78–100)
MONOCYTES # BLD AUTO: 0.4 10E3/UL (ref 0–1.3)
MONOCYTES NFR BLD AUTO: 3 %
NEUTROPHILS # BLD AUTO: 11.5 10E3/UL (ref 1.6–8.3)
NEUTROPHILS NFR BLD AUTO: 87 %
NRBC # BLD AUTO: 0 10E3/UL
NRBC BLD AUTO-RTO: 0 /100
PLATELET # BLD AUTO: 316 10E3/UL (ref 150–450)
POTASSIUM SERPL-SCNC: 4.8 MMOL/L (ref 3.4–5.3)
RBC # BLD AUTO: 4.31 10E6/UL (ref 3.8–5.2)
SODIUM SERPL-SCNC: 137 MMOL/L (ref 135–145)
WBC # BLD AUTO: 13.2 10E3/UL (ref 4–11)

## 2025-07-20 PROCEDURE — 250N000011 HC RX IP 250 OP 636: Performed by: HOSPITALIST

## 2025-07-20 PROCEDURE — 99024 POSTOP FOLLOW-UP VISIT: CPT | Performed by: NURSE PRACTITIONER

## 2025-07-20 PROCEDURE — 99223 1ST HOSP IP/OBS HIGH 75: CPT | Performed by: HOSPITALIST

## 2025-07-20 PROCEDURE — 36415 COLL VENOUS BLD VENIPUNCTURE: CPT | Performed by: EMERGENCY MEDICINE

## 2025-07-20 PROCEDURE — 250N000013 HC RX MED GY IP 250 OP 250 PS 637: Performed by: EMERGENCY MEDICINE

## 2025-07-20 PROCEDURE — 99285 EMERGENCY DEPT VISIT HI MDM: CPT | Mod: 25 | Performed by: EMERGENCY MEDICINE

## 2025-07-20 PROCEDURE — 250N000012 HC RX MED GY IP 250 OP 636 PS 637: Performed by: HOSPITALIST

## 2025-07-20 PROCEDURE — 120N000001 HC R&B MED SURG/OB

## 2025-07-20 PROCEDURE — 99285 EMERGENCY DEPT VISIT HI MDM: CPT | Mod: 25

## 2025-07-20 PROCEDURE — 80048 BASIC METABOLIC PNL TOTAL CA: CPT | Performed by: EMERGENCY MEDICINE

## 2025-07-20 PROCEDURE — 250N000013 HC RX MED GY IP 250 OP 250 PS 637: Performed by: HOSPITALIST

## 2025-07-20 PROCEDURE — 250N000011 HC RX IP 250 OP 636: Performed by: EMERGENCY MEDICINE

## 2025-07-20 PROCEDURE — 85025 COMPLETE CBC W/AUTO DIFF WBC: CPT | Performed by: EMERGENCY MEDICINE

## 2025-07-20 RX ORDER — HYDROMORPHONE HYDROCHLORIDE 2 MG/1
4 TABLET ORAL EVERY 4 HOURS PRN
Refills: 0 | Status: DISCONTINUED | OUTPATIENT
Start: 2025-07-20 | End: 2025-07-20

## 2025-07-20 RX ORDER — HYDROMORPHONE HCL IN WATER/PF 6 MG/30 ML
0.4 PATIENT CONTROLLED ANALGESIA SYRINGE INTRAVENOUS
Status: DISCONTINUED | OUTPATIENT
Start: 2025-07-20 | End: 2025-07-20

## 2025-07-20 RX ORDER — ACETAMINOPHEN 325 MG/1
975 TABLET ORAL EVERY 8 HOURS
Status: DISCONTINUED | OUTPATIENT
Start: 2025-07-20 | End: 2025-07-22 | Stop reason: HOSPADM

## 2025-07-20 RX ORDER — HYDROMORPHONE HYDROCHLORIDE 2 MG/1
2 TABLET ORAL EVERY 4 HOURS PRN
Status: DISCONTINUED | OUTPATIENT
Start: 2025-07-20 | End: 2025-07-21

## 2025-07-20 RX ORDER — METHOCARBAMOL 500 MG/1
500 TABLET, FILM COATED ORAL 4 TIMES DAILY
Status: DISCONTINUED | OUTPATIENT
Start: 2025-07-20 | End: 2025-07-20

## 2025-07-20 RX ORDER — NALOXONE HYDROCHLORIDE 0.4 MG/ML
0.4 INJECTION, SOLUTION INTRAMUSCULAR; INTRAVENOUS; SUBCUTANEOUS
Status: DISCONTINUED | OUTPATIENT
Start: 2025-07-20 | End: 2025-07-22 | Stop reason: HOSPADM

## 2025-07-20 RX ORDER — HYDROMORPHONE HYDROCHLORIDE 1 MG/ML
0.5 INJECTION, SOLUTION INTRAMUSCULAR; INTRAVENOUS; SUBCUTANEOUS EVERY 30 MIN PRN
Refills: 0 | Status: DISCONTINUED | OUTPATIENT
Start: 2025-07-20 | End: 2025-07-20

## 2025-07-20 RX ORDER — LIDOCAINE 40 MG/G
CREAM TOPICAL
Status: DISCONTINUED | OUTPATIENT
Start: 2025-07-20 | End: 2025-07-22 | Stop reason: HOSPADM

## 2025-07-20 RX ORDER — ACETAMINOPHEN 650 MG/1
650 SUPPOSITORY RECTAL EVERY 8 HOURS
Status: DISCONTINUED | OUTPATIENT
Start: 2025-07-20 | End: 2025-07-20

## 2025-07-20 RX ORDER — ONDANSETRON 4 MG/1
4 TABLET, ORALLY DISINTEGRATING ORAL EVERY 6 HOURS PRN
Status: DISCONTINUED | OUTPATIENT
Start: 2025-07-20 | End: 2025-07-22 | Stop reason: HOSPADM

## 2025-07-20 RX ORDER — POLYETHYLENE GLYCOL 3350 17 G/17G
17 POWDER, FOR SOLUTION ORAL DAILY
Status: DISCONTINUED | OUTPATIENT
Start: 2025-07-21 | End: 2025-07-22 | Stop reason: HOSPADM

## 2025-07-20 RX ORDER — IBUPROFEN 600 MG/1
600 TABLET, FILM COATED ORAL EVERY 8 HOURS PRN
COMMUNITY

## 2025-07-20 RX ORDER — HYDROMORPHONE HYDROCHLORIDE 1 MG/ML
0.5 INJECTION, SOLUTION INTRAMUSCULAR; INTRAVENOUS; SUBCUTANEOUS
Status: DISCONTINUED | OUTPATIENT
Start: 2025-07-20 | End: 2025-07-21

## 2025-07-20 RX ORDER — ONDANSETRON 2 MG/ML
4 INJECTION INTRAMUSCULAR; INTRAVENOUS EVERY 6 HOURS PRN
Status: DISCONTINUED | OUTPATIENT
Start: 2025-07-20 | End: 2025-07-22 | Stop reason: HOSPADM

## 2025-07-20 RX ORDER — PROCHLORPERAZINE MALEATE 10 MG
10 TABLET ORAL EVERY 6 HOURS PRN
Status: DISCONTINUED | OUTPATIENT
Start: 2025-07-20 | End: 2025-07-22 | Stop reason: HOSPADM

## 2025-07-20 RX ORDER — AMOXICILLIN 250 MG
1 CAPSULE ORAL 2 TIMES DAILY PRN
Status: DISCONTINUED | OUTPATIENT
Start: 2025-07-20 | End: 2025-07-22 | Stop reason: HOSPADM

## 2025-07-20 RX ORDER — NALOXONE HYDROCHLORIDE 0.4 MG/ML
0.2 INJECTION, SOLUTION INTRAMUSCULAR; INTRAVENOUS; SUBCUTANEOUS
Status: DISCONTINUED | OUTPATIENT
Start: 2025-07-20 | End: 2025-07-22 | Stop reason: HOSPADM

## 2025-07-20 RX ORDER — HYDROMORPHONE HCL IN WATER/PF 6 MG/30 ML
0.2 PATIENT CONTROLLED ANALGESIA SYRINGE INTRAVENOUS
Status: DISCONTINUED | OUTPATIENT
Start: 2025-07-20 | End: 2025-07-22

## 2025-07-20 RX ORDER — PREDNISONE 20 MG/1
20 TABLET ORAL 2 TIMES DAILY
Status: DISPENSED | OUTPATIENT
Start: 2025-07-20 | End: 2025-07-21

## 2025-07-20 RX ORDER — ACETAMINOPHEN 325 MG/1
975 TABLET ORAL ONCE
Status: COMPLETED | OUTPATIENT
Start: 2025-07-20 | End: 2025-07-20

## 2025-07-20 RX ORDER — VENLAFAXINE HYDROCHLORIDE 75 MG/1
150 CAPSULE, EXTENDED RELEASE ORAL EVERY EVENING
Status: DISCONTINUED | OUTPATIENT
Start: 2025-07-20 | End: 2025-07-22 | Stop reason: HOSPADM

## 2025-07-20 RX ORDER — AMOXICILLIN 250 MG
2 CAPSULE ORAL 2 TIMES DAILY PRN
Status: DISCONTINUED | OUTPATIENT
Start: 2025-07-20 | End: 2025-07-22 | Stop reason: HOSPADM

## 2025-07-20 RX ORDER — POLYETHYLENE GLYCOL 3350 17 G/17G
1 POWDER, FOR SOLUTION ORAL DAILY
COMMUNITY

## 2025-07-20 RX ORDER — METHOCARBAMOL 500 MG/1
500 TABLET, FILM COATED ORAL 4 TIMES DAILY PRN
Status: DISCONTINUED | OUTPATIENT
Start: 2025-07-20 | End: 2025-07-21

## 2025-07-20 RX ORDER — ONDANSETRON 2 MG/ML
4 INJECTION INTRAMUSCULAR; INTRAVENOUS EVERY 30 MIN PRN
Status: DISCONTINUED | OUTPATIENT
Start: 2025-07-20 | End: 2025-07-20

## 2025-07-20 RX ORDER — CALCIUM CARBONATE 500 MG/1
1000 TABLET, CHEWABLE ORAL 4 TIMES DAILY PRN
Status: DISCONTINUED | OUTPATIENT
Start: 2025-07-20 | End: 2025-07-22 | Stop reason: HOSPADM

## 2025-07-20 RX ORDER — PREDNISONE 20 MG/1
20 TABLET ORAL 2 TIMES DAILY
COMMUNITY

## 2025-07-20 RX ADMIN — ACETAMINOPHEN 975 MG: 325 TABLET ORAL at 21:25

## 2025-07-20 RX ADMIN — HYDROMORPHONE HYDROCHLORIDE 1 MG: 1 INJECTION, SOLUTION INTRAMUSCULAR; INTRAVENOUS; SUBCUTANEOUS at 17:12

## 2025-07-20 RX ADMIN — VENLAFAXINE HYDROCHLORIDE 150 MG: 75 CAPSULE, EXTENDED RELEASE ORAL at 19:49

## 2025-07-20 RX ADMIN — PREDNISONE 20 MG: 20 TABLET ORAL at 21:25

## 2025-07-20 RX ADMIN — METHOCARBAMOL 500 MG: 500 TABLET ORAL at 21:25

## 2025-07-20 RX ADMIN — ONDANSETRON 4 MG: 2 INJECTION, SOLUTION INTRAMUSCULAR; INTRAVENOUS at 17:12

## 2025-07-20 RX ADMIN — HYDROMORPHONE HYDROCHLORIDE 2 MG: 2 TABLET ORAL at 20:03

## 2025-07-20 RX ADMIN — HYDROMORPHONE HYDROCHLORIDE 0.5 MG: 1 INJECTION, SOLUTION INTRAMUSCULAR; INTRAVENOUS; SUBCUTANEOUS at 21:32

## 2025-07-20 RX ADMIN — ACETAMINOPHEN 975 MG: 325 TABLET ORAL at 17:12

## 2025-07-20 ASSESSMENT — ACTIVITIES OF DAILY LIVING (ADL)
ADLS_ACUITY_SCORE: 59

## 2025-07-20 ASSESSMENT — COLUMBIA-SUICIDE SEVERITY RATING SCALE - C-SSRS
6. HAVE YOU EVER DONE ANYTHING, STARTED TO DO ANYTHING, OR PREPARED TO DO ANYTHING TO END YOUR LIFE?: NO
1. IN THE PAST MONTH, HAVE YOU WISHED YOU WERE DEAD OR WISHED YOU COULD GO TO SLEEP AND NOT WAKE UP?: NO
2. HAVE YOU ACTUALLY HAD ANY THOUGHTS OF KILLING YOURSELF IN THE PAST MONTH?: NO

## 2025-07-20 NOTE — TELEPHONE ENCOUNTER
Patient called back. Reviewed MRI results with patient    EXAM: MR LUMBAR SPINE W/O and W CONTRAST  LOCATION: [facility]  DATE: 7/19/2025     FINDINGS:   Nomenclature is based on 5 lumbar type vertebral bodies. Postop changes of L5-S1 right hemilaminotomy and microdiscectomy. Normal vertebral body heights. Negative for fracture. Type II Modic endplate changes at L5-S1 and L2-3. Normal distal spinal cord   and cauda equina with conus medullaris at L1. No extraspinal abnormality. Unremarkable visualized bony pelvis.     T12-L1: Disc desiccation and ventral osteophyte. Patent central canal and foramen.      L1-L2: Normal disc height and signal. No herniation. Normal facets. No spinal canal or neural foraminal stenosis.     L2-L3: Disc desiccation and broad-based posterior posterior lateral disc bulge. Small left foraminal disc protrusion contacts the exiting left L2 ganglion and mildly to moderately narrows the foramen. Minimal right foraminal stenosis. Patent central   canal. Mild narrowing of the left subarticular recess with slight left L3 nerve contact is seen on axial image 26 of series 107. Right subarticular recess patent. Unremarkable facets.      L3-L4: Disc desiccation. Small left paracentral disc protrusion narrows the upper portion of the left lateral recess encroaching upon the traversing left L4 nerve. Right lateral recess patent. Patent central canal. Mild left foraminal stenosis. Right   foramen patent.     L4-L5: Left hemilaminotomy post op changes. Very small left paracentral disc extrusion is redemonstrated extending cephalad. Facet DJD. Borderline central canal stenosis. Moderate left subarticular recess stenosis with left L5 nerve contact. Right   subarticular recess minimally narrowed. Moderate left and mild right foraminal stenosis.     L5-S1: Bilateral hemilaminotomies. Right hemilaminotomy new since previous. Large right paracentral disc extrusion with disc material  extending cephalad above the lower L5 endplate is present measuring 12 x 12 mm transverse by 15 mm cephalocaudad.   Extension into the medial aspect of the right neural foramen. Severe impingement of the right L5 and S1 nerve elements within the foramen and subarticular recess. Thecal sac compression with moderate to severe central canal stenosis. Mild facet   arthropathy. Mild left foraminal stenosis.     Postcontrast images with linear enhancement at the right L5-S1 hemilaminotomy and microdiscectomy site. Linear epidural enhancement adjacent to the large extruded disc fragment is seen. No evidence for nerve root clumping, nodularity, nerve root   enhancement to suggest arachnoiditis. No definite evidence for epidural hematoma or abscess.     IMPRESSION:  1.  Large, recurrent right paracentral L5-S1 disc extrusion with cephalad migration of disc material. Severe right foraminal and subarticular recess stenosis. Moderate to severe central canal stenosis.  2.  Small, recurrent left paracentral L4-5 disc extrusion.  3.  Small left paracentral L3-4 disc protrusion.  4.  Small left foraminal L2-3 disc protrusion.    Patient reports uncontrolled pain, reporting 10/10 pain even with PO Dilaudid. Endorses radiating pain down the right of back leg and now wrapping into the groin and front of leg into the foot. Reports tingling and burning down the leg.     PLAN  -Plan to present to  ED for pain control with plan for redo L5-S1 bilateral laminectomy and microdiscectomy TOMORROW, 7/21/2025.   -Patient to be NPO at midnight  -Case request submitted  -All questions answered and patient verbalized understanding of plan.     Plan reviewed with Dr. Atkinson.  Corinne Fanta MSN, AGABridge[facility] Neuro[facility]  Tel [phone]

## 2025-07-20 NOTE — PROGRESS NOTES
NSGY PAGER NOTE    Paged by and discussed with Dr. Diaz, ED provider    Kacy Herrera is a 55-year-old female status post right sided L5-S1 hemilaminectomy, possible medial facetectomy, microdiscectomy with Dr. Timmons on 7/3/2025.  Postoperatively, patient developed worsening right leg pain with associated burning pain of the right leg.  MRI L-spine completed demonstrating large recurrent right paracentral L5-S1 disc extrusion with cephalad migration of disc material, severe right foraminal and subarticular recess stenosis, moderate to severe central canal stenosis.    Per ED provider patient with severe low back pain, reportedly 10/10 in ED.  Patient has not been up to ambulate in the ED, but per provider strength intact and equal with dorsiflexion/plantarflexion.  Patient denies saddle anesthesia.    PLAN  - Admit to [facility] under hospitalist  - Pain control measures as needed  - Plan for redo L5-S1 bilateral laminectomy and microdiscectomy tomorrow with Dr. Wagner at midnight  - Preop orders placed  - Full consult to follow    Corinne Fanta MSN, AGACNP-BC  [facility] Neurosurgery  Tel [phone]  Text page via [facility] Paging/Directory    IMAGING  EXAM: MR LUMBAR SPINE W/O and W CONTRAST  LOCATION: [facility]  DATE: 7/19/2025     INDICATION:  Lumbar radiculopathy, S/P spinal surgery  COMPARISON: Lumbar radiographs July 3, 2025. MRI lumbar spine Tereza 3, 2025.  CONTRAST: 8mL Gadavist  TECHNIQUE: Routine Lumbar Spine MRI without and with IV contrast.     FINDINGS:   Nomenclature is based on 5 lumbar type vertebral bodies. Postop changes of L5-S1 right hemilaminotomy and microdiscectomy. Normal vertebral body heights. Negative for fracture. Type II Modic endplate changes at L5-S1 and L2-3. Normal distal spinal cord   and cauda equina with conus medullaris at L1. No extraspinal abnormality. Unremarkable visualized bony pelvis.     T12-L1: Disc desiccation and ventral osteophyte.  Patent central canal and foramen.      L1-L2: Normal disc height and signal. No herniation. Normal facets. No spinal canal or neural foraminal stenosis.     L2-L3: Disc desiccation and broad-based posterior posterior lateral disc bulge. Small left foraminal disc protrusion contacts the exiting left L2 ganglion and mildly to moderately narrows the foramen. Minimal right foraminal stenosis. Patent central   canal. Mild narrowing of the left subarticular recess with slight left L3 nerve contact is seen on axial image 26 of series 107. Right subarticular recess patent. Unremarkable facets.      L3-L4: Disc desiccation. Small left paracentral disc protrusion narrows the upper portion of the left lateral recess encroaching upon the traversing left L4 nerve. Right lateral recess patent. Patent central canal. Mild left foraminal stenosis. Right   foramen patent.     L4-L5: Left hemilaminotomy post op changes. Very small left paracentral disc extrusion is redemonstrated extending cephalad. Facet DJD. Borderline central canal stenosis. Moderate left subarticular recess stenosis with left L5 nerve contact. Right   subarticular recess minimally narrowed. Moderate left and mild right foraminal stenosis.     L5-S1: Bilateral hemilaminotomies. Right hemilaminotomy new since previous. Large right paracentral disc extrusion with disc material extending cephalad above the lower L5 endplate is present measuring 12 x 12 mm transverse by 15 mm cephalocaudad.   Extension into the medial aspect of the right neural foramen. Severe impingement of the right L5 and S1 nerve elements within the foramen and subarticular recess. Thecal sac compression with moderate to severe central canal stenosis. Mild facet   arthropathy. Mild left foraminal stenosis.     Postcontrast images with linear enhancement at the right L5-S1 hemilaminotomy and microdiscectomy site. Linear epidural enhancement adjacent to the large extruded disc fragment is seen. No  evidence for nerve root clumping, nodularity, nerve root   enhancement to suggest arachnoiditis. No definite evidence for epidural hematoma or abscess.     IMPRESSION:  1.  Large, recurrent right paracentral L5-S1 disc extrusion with cephalad migration of disc material. Severe right foraminal and subarticular recess stenosis. Moderate to severe central canal stenosis.  2.  Small, recurrent left paracentral L4-5 disc extrusion.  3.  Small left paracentral L3-4 disc protrusion.  4.  Small left foraminal L2-3 disc protrusion

## 2025-07-20 NOTE — TELEPHONE ENCOUNTER
LVM for patient with call back number to review MRI Lumbar spine from 7/19/2025 and to discuss next steps.     Corinne Fanta MSN, AGACNP-[facility] Neurosurgery  Tel [phone]

## 2025-07-20 NOTE — PHARMACY-ADMISSION MEDICATION HISTORY
Pharmacist Admission Medication History    Admission medication history is complete. The information provided in this note is only as accurate as the sources available at the time of the update.    Information Source(s): Patient, Family member, Hospital records, and CareEverywhere/SureScripts via in-person    Changes made to PTA medication list:  Added: -Prednisone 20 mg PO twice daily-patient only had 2 doses left of course  -Ibuprofen 600 gm PO every 8 hours as needed for pain  -Polyethylene glycol 17 g PO daily  Deleted: Methylprednisolone 4 mg Dosepak  Changed: None    Allergies reviewed with patient and updates made in EHR: yes    Medication History Completed By: Jorge Power, PharmD 7/20/2025 5:48 PM    PTA Med List   Medication Sig Note Last Dose/Taking    acetaminophen (TYLENOL) 325 MG tablet Take 2 tablets (650 mg) by mouth every 4 hours as needed for other (mild pain)  7/19/2025 Evening    HYDROmorphone (DILAUDID) 2 MG tablet Take 1-2 tablets (2-4 mg) by mouth every 4 hours as needed for moderate to severe pain.  7/20/2025 Morning    ibuprofen (ADVIL/MOTRIN) 600 MG tablet Take 600 mg by mouth every 8 hours as needed for moderate pain.  7/20/2025 Morning    levonorgestrel (MIRENA) 52 MG (20 mcg/day) IUD 1 each by Intrauterine route.  Past Month    methocarbamol (ROBAXIN) 750 MG tablet Take 1 tablet (750 mg) by mouth every 6 hours as needed for muscle spasms.  7/20/2025 Morning    ondansetron (ZOFRAN) 4 MG tablet Take 1 tablet (4 mg) by mouth every 6 hours as needed for nausea.  7/20/2025 Morning    polyethylene glycol (MIRALAX) 17 g packet Take 1 packet by mouth daily.  7/20/2025 Morning    predniSONE (DELTASONE) 20 MG tablet Take 20 mg by mouth 2 times daily. 7/20/2025: Patient only had a couple doses left of course 7/20/2025 Morning    venlafaxine (EFFEXOR-XR) 150 MG 24 hr capsule Take 150 mg by mouth every evening.  7/19/2025 Evening

## 2025-07-20 NOTE — PROGRESS NOTES
RECEIVING UNIT ED HANDOFF REVIEW    ED Nurse Handoff Report was reviewed by: Anne-Marie Sheffield RN on July 20, 2025 at 6:22 PM

## 2025-07-20 NOTE — ED TRIAGE NOTES
Back surgery July 3rd and has been having pain since with radiation down right leg, MRI done Saturday and was told to come to ER for surgery to be done on Monday

## 2025-07-20 NOTE — H&P
[facility]    History and Physical - Hospitalist Service       Date of Admission:  7/20/2025    Assessment & Plan        Kacy Herrera is a 55 year old female who medical history which includes lumbar back pain with radiculopathy, chronic pain of the left knee, depression, prior history of left L5-S1 micro discectomy and on 7/3 she underwent right sided L5-S1 hemilaminectomy, medial facetectomy and microdiscectomy  with Dr. Atkinson and was discharged home on Medrol Dosepak and pain medication and after surgical intervention she has been having increased pain and stopped taking Dilaudid which made her groggy and wa and  taking Tylenol and ibuprofen and started taking tramadol which did not help and underwent MRI s as outpatient on 7/19 which showed large recurrent right paracentral L5-S1 text disc fusion with cephalad migration of the disc material and severe right foraminal and subarticular recess stenosis and moderate to severe canal stenosis  Came to the ED with 10/ 10 pain and admitted on 7/20/2025      Intractable back pain  Status post L5-S1 hemilaminectomy, medial facetectomy and microdiscectomy  with Dr. Atkinson on 7/3 now with worsening MRI change  History of left L5-S1 microdiscectomy    -Of note patient underwent above operative intervention and after operative intervention she has been having difficulty with pain and nausea and has been trying different medications with less relief  - MRI as outpatient on 7/19 showed large recurrent right paracentral L5-S1 text disc fusion with cephalad migration of the disc material and severe right foraminal and subarticular recess stenosis and moderate to severe canal stenosis  -Neurosurgery was contacted in the ED and they recommended admission with plan for-surgical intervention on 7/21  -On exam she has equal strength in lower extremities and does not have any red flags including-no bowel or bladder incontinence  -N.p.o. after  midnight  --neurochecks every 4 hour  -We will continue with scheduled Tylenol 975 every 8-, as needed Dilaudid for moderate pain as needed and will also have IV Dilaudid available for severe pain and will add Robaxin as needed and continue with prednisone which she will complete tomorrow  -Pain management consult    Mild leukocytosis likely due to stress response and prednisone use  - No evidence of any infection    Depression  -Continue with PTA venlafaxine    Family communication  - Discussed plan of care in detail with patient's  and he was in agreement          Diet:  Regular and n.p.o. after midnight  DVT Prophylaxis: Pneumatic Compression Devices  Garza Catheter: Not present  Lines: None     Cardiac Monitoring: None  Code Status:  Full code    Clinically Significant Risk Factors Present on Admission          # Hypochloremia: Lowest Cl = 97 mmol/L in last 2 days, will monitor as appropriate                               Disposition Plan     Medically Ready for Discharge: Anticipated in 2-4 Days, will need to do neurosurgical intervention           Marcio Cornejo MD  Hospitalist Service  [facility]  Securely message with [phone] (more info)  Text page via [employer] Paging/Directory     This note was completed in part using dictation via the Dragon voice recognition software. Some word and grammatical errors may occur and must be interpreted in the appropriate clinical context. If there are any questions pertaining to this issue, please contact me for further clarification.      ______________________________________________________________________    Chief Complaint     Bilateral lower back    History is obtained from the patient    History of Present Illness     Kacy Herrera is a 55 year old female who medical history which includes lumbar back pain with radiculopathy, chronic pain of the left knee, depression, prior history of left L5-S1 micro discectomy and on 7/3 she  underwent right sided L5-S1 hemilaminectomy, medial facetectomy and microdiscectomy  with Dr. Atkinson and was discharged home on Medrol Dosepak and pain medication and after surgical intervention she has been having increased pain and stopped taking Dilaudid which made her groggy and wa and  taking Tylenol and ibuprofen and started taking tramadol which did not help and underwent MRI s as outpatient on 7/19 which showed large recurrent right paracentral L5-S1 text disc fusion with cephalad migration of the disc material and severe right foraminal and subarticular recess stenosis and moderate to severe canal stenosis  Came to the ED with 10/ 10 pain and patient denied any saddle anesthesia and her strength is intact in the ED.    The neurosurgery team was consulted and they recommended admission with plan to redo L5-S1 bilateral laminectomy and microdiscectomy and n.p.o. after midnight    Patient denies any fever, chills, vomiting and at times can get nausea and denies any shortness of breath or chest discomfort.  Patient denies any urinary or bladder incontinence    ED course and lab    Vitals on admission to the ER was stable and she underwent basic metabolic panel which was unremarkable apart from mildly elevated blood sugar of 137 with chloride of 97 and mildly elevated white count at 13.2 with hemoglobin of 13.2 and platelet count of 3 1    In the ER patient was given IV Dilaudid along with Tylenol and will be admitted        Past Medical History    Past Medical History:   Diagnosis Date    Arthritis     knee left    Cholelithiasis     Depressive disorder     Left knee pain     Lumbar back pain with radiculopathy affecting right lower extremity     Other chronic pain     left knee    Overweight     Tobacco dependence     Vasomotor symptoms due to menopause        Past Surgical History   Past Surgical History:   Procedure Laterality Date    ARTHROPLASTY REVISION KNEE Left 01/04/2022    Procedure: Left revision left  total knee arthroplasty using a Ashwini LCCK knee system;  Surgeon: Bradly Treviño MD;  Location: [facility] OR    ARTHROSCOPY KNEE WITH MENISCAL REPAIR Left      SECTION      FOOT SURGERY      JOINT REPLACEMENT Left 2016    uni compartmenta    JOINT REPLACEMENT (aka KNEE) NOS Left     LAMINECTOMY LUMBAR ONE LEVEL Right 7/3/2025    Procedure: Right sided Lumbar 5-Sacral 1 hemilaminectomy, medial facetectomy and microdiscectomy;  Surgeon: Ryan Atkinson MD;  Location: [facility]    LAPAROSCOPIC CHOLECYSTECTOMY N/A 2020    Procedure: CHOLECYSTECTOMY, LAPAROSCOPIC with cholangiograms;  Surgeon: Shirley Purcell MD;  Location: [facility] OR    LUMBAR DISCECTOMY      [facility] PLASTY KNEE,MED OR LAT COMPARTMT Left 2017    Procedure: CONVERT LEFT UNICOMPARTMENTAL ;  Surgeon: Josh Moseley MD;  Location: [facility];  Service: Orthopedics    [facility] TOTAL KNEE ARTHROPLASTY Left 2017    Procedure: TO TOTAL KNEE ARTHROPLASTY;  Surgeon: Josh Moseley MD;  Location: [facility];  Service: Orthopedics       Prior to Admission Medications   Prior to Admission Medications   Prescriptions Last Dose Informant Patient Reported? Taking?   HYDROmorphone (DILAUDID) 2 MG tablet   No No   Sig: Take 1-2 tablets (2-4 mg) by mouth every 4 hours as needed for moderate to severe pain.   acetaminophen (TYLENOL) 325 MG tablet   No No   Sig: Take 2 tablets (650 mg) by mouth every 4 hours as needed for other (mild pain)   levonorgestrel (MIRENA) 52 MG (20 mcg/day) IUD   Yes No   Si each by Intrauterine route.   methocarbamol (ROBAXIN) 750 MG tablet   No No   Sig: Take 1 tablet (750 mg) by mouth every 6 hours as needed for muscle spasms.   methylPREDNISolone (MEDROL DOSEPAK) 4 MG tablet therapy pack   No No   Sig: Follow Package Directions   ondansetron (ZOFRAN) 4 MG tablet   No No   Sig: Take 1 tablet (4 mg) by mouth every 6 hours as needed for nausea.   senna-docusate (SENOKOT-S/PERICOLACE) 8.6-50 MG tablet    No No   Sig: Take 1 tablet by mouth 2 times daily as needed for constipation.   venlafaxine (EFFEXOR-XR) 150 MG 24 hr capsule   Yes No   Sig: Take 150 mg by mouth every evening.      Facility-Administered Medications: None           Physical Exam   Vital Signs: Temp: 98  F (36.7  C)   BP: (!) 166/81 Pulse: 103   Resp: 16 SpO2: 97 %      Weight: 178 lbs 0 oz        General: AO x 3 and in mild distress due to pain  HEENT: Head is atraumatic, normocephalic.  Moist oral mucosa  Neck: Neck is supple   Respiratory: Lungs are clear to auscultation bilaterally with no wheeze or crackles   Cardiovascular: Regular rate , S1 and S2 normal with no murmer or rubs or gallops  Abdomen:   soft , non tender , non distended and bowel sound present   Skin: No skin rashes or lesions to inspection or palpation.  Neurologic: No focal deficit  Musculoskeletal: Normal Range of motion over upper and lower extremities bilaterally   Psychiatric: cooperative      Medical Decision Making       Time spent in care of patient is 75 minutes and I reviewed labs and recent imaging done on 7/19 and discussed plan of care in detail with patient and the nursing staff and the ED physician      Data     I have personally reviewed the following data over the past 24 hrs:    13.2 (H)  \   13.2   / 316     137 97 (L) 16.2 /  137 (H)   4.8 29 0.70 \       Imaging results reviewed over the past 24 hrs:   No results found for this or any previous visit (from the past 24 hours).

## 2025-07-20 NOTE — ED PROVIDER NOTES
Emergency Department Note      History of Present Illness     Chief Complaint   Post-op Problem      HPI   Kacy Herrera is a 55 year old female history of recent back surgery on July 3, 2025, arthritis, cholelithiasis, depression, tobacco dependence presents with increased low back pain and radicular symptoms down the right leg.   and her note that she had recent surgery and has been downhill ever since.  She had an MRI done yesterday that showed recurrence of the disc herniation and was directed to the ER with plans for admission and surgery tomorrow.  She notes pain in low back going down the right leg with pain with walking and difficulty walking.  She denies saddle anesthesia or loss of bowel or bladder function.  She is not anticoagulated.  She is currently taking 4 mg of Dilaudid every 4 hours and is not managing her pain.      Independent Historian   Patient and  in the room    Review of External Notes   Clinic notes, recent MRI  IMPRESSION:  1.  Large, recurrent right paracentral L5-S1 disc extrusion with cephalad migration of disc material. Severe right foraminal and subarticular recess stenosis. Moderate to severe central canal stenosis.  2.  Small, recurrent left paracentral L4-5 disc extrusion.  3.  Small left paracentral L3-4 disc protrusion.  4.  Small left foraminal L2-3 disc protrusion.       Past Medical History     Medical History and Problem List   Past Medical History:   Diagnosis Date    Arthritis     Cholelithiasis     Depressive disorder     Left knee pain     Lumbar back pain with radiculopathy affecting right lower extremity     Other chronic pain     Overweight     Tobacco dependence     Vasomotor symptoms due to menopause        Medications   acetaminophen (TYLENOL) 325 MG tablet  HYDROmorphone (DILAUDID) 2 MG tablet  ibuprofen (ADVIL/MOTRIN) 600 MG tablet  levonorgestrel (MIRENA) 52 MG (20 mcg/day) IUD  methocarbamol (ROBAXIN) 750 MG tablet  ondansetron (ZOFRAN) 4 MG  tablet  polyethylene glycol (MIRALAX) 17 g packet  predniSONE (DELTASONE) 20 MG tablet  venlafaxine (EFFEXOR-XR) 150 MG 24 hr capsule        Surgical History   Past Surgical History:   Procedure Laterality Date    ARTHROPLASTY REVISION KNEE Left 2022    Procedure: Left revision left total knee arthroplasty using a Ashwini LCCK knee system;  Surgeon: Bradly Treviño MD;  Location: [facility] OR    ARTHROSCOPY KNEE WITH MENISCAL REPAIR Left      SECTION      FOOT SURGERY      JOINT REPLACEMENT Left 2016    uni compartmenta    JOINT REPLACEMENT (aka KNEE) NOS Left     LAMINECTOMY LUMBAR ONE LEVEL Right 7/3/2025    Procedure: Right sided Lumbar 5-Sacral 1 hemilaminectomy, medial facetectomy and microdiscectomy;  Surgeon: Ryan Atkinson MD;  Location: [facility]    LAPAROSCOPIC CHOLECYSTECTOMY N/A 2020    Procedure: CHOLECYSTECTOMY, LAPAROSCOPIC with cholangiograms;  Surgeon: Shirley Purcell MD;  Location: [facility] OR    LUMBAR DISCECTOMY      [facility] PLASTY KNEE,MED OR LAT COMPARTMT Left 2017    Procedure: CONVERT LEFT UNICOMPARTMENTAL ;  Surgeon: Josh Moseley MD;  Location: [facility];  Service: Orthopedics    [facility] TOTAL KNEE ARTHROPLASTY Left 2017    Procedure: TO TOTAL KNEE ARTHROPLASTY;  Surgeon: Josh Moseley MD;  Location: [facility];  Service: Orthopedics       Physical Exam     Patient Vitals for the past 24 hrs:   BP Temp Pulse Resp SpO2 Weight   25 1624 (!) 166/81 98  F (36.7  C) 103 16 97 % 80.7 kg (178 lb)     Physical Exam  GENERAL: Laying supine on her left side looks uncomfortable  HEAD: atraumatic  EYES: pupils reactive, extraocular muscles intact, conjunctivae normal  ENT:  mucus membranes moist  NECK:  trachea midline, normal range of motion  RESPIRATORY: no tachypnea, breath sounds clear to auscultation   CVS: normal S1/S2, no murmurs, intact distal pulses  ABDOMEN: soft, nontender, nondistention  MUSCULOSKELETAL: Low back pain good dorsiflexion  plantarflexion of the great toe  SKIN: warm and dry, no acute rashes or ulceration  NEURO: GCS 15, cranial nerves intact, alert and oriented x3  PSYCH:  Mood/affect normal      Diagnostics     Lab Results   Labs Ordered and Resulted from Time of ED Arrival to Time of ED Departure   BASIC METABOLIC PANEL - Abnormal       Result Value    Sodium 137      Potassium 4.8      Chloride 97 (*)     Carbon Dioxide (CO2) 29      Anion Gap 11      Urea Nitrogen 16.2      Creatinine 0.70      GFR Estimate >90      Calcium 9.4      Glucose 137 (*)    CBC WITH PLATELETS AND DIFFERENTIAL - Abnormal    WBC Count 13.2 (*)     RBC Count 4.31      Hemoglobin 13.2      Hematocrit 39.8      MCV 92      MCH 30.6      MCHC 33.2      RDW 14.5      Platelet Count 316      % Neutrophils 87      % Lymphocytes 10      % Monocytes 3      % Eosinophils 0      % Basophils 0      % Immature Granulocytes 1      NRBCs per 100 WBC 0      Absolute Neutrophils 11.5 (*)     Absolute Lymphocytes 1.3      Absolute Monocytes 0.4      Absolute Eosinophils 0.0      Absolute Basophils 0.0      Absolute Immature Granulocytes 0.1      Absolute NRBCs 0.0         Imaging   No orders to display         Independent Interpretation   None    ED Course      Medications Administered   Medications   ondansetron (ZOFRAN) injection 4 mg (4 mg Intravenous $Given 7/20/25 1712)   HYDROmorphone (PF) (DILAUDID) injection 0.5 mg (has no administration in time range)   HYDROmorphone (DILAUDID) injection 1 mg (1 mg Intravenous $Given 7/20/25 1712)   acetaminophen (TYLENOL) tablet 975 mg (975 mg Oral $Given 7/20/25 1712)       Procedures   Procedures     Discussion of Management   Dr. Cornejo hospitalist   Neurosurgery Corinne Fanta, MARIA DE JESUS    ED Course        Additional Documentation  None    Medical Decision Making / Diagnosis     CMS Diagnoses: None    MIPS   None         MDM   Kacy Herrera is a 55 year old female who presents with severe low back pain and radicular symptoms  down the right leg with recent surgery that has gotten progressively worse since surgery and had a recent MRI showing large recurrent disc at L5-S1.  She was directed back here from her surgical team with plans for admission for surgery tomorrow.  She is currently having 10 out of 10 pain despite 4 mg of Dilaudid every 4 hours.  She is neurologically intact no cauda equina findings.  Spoke with neurosurgery who sent her in and requesting hospitalist for admission.  Patient was given Dilaudid for pain and spoke with the hospitalist.  Patient feels improvement in comfortable after 1 mg of Dilaudid.    Disposition   The patient was admitted to the hospital.     Diagnosis     ICD-10-CM    1. Acute bilateral low back pain with right-sided sciatica  M54.41       2. Intractable pain  R52            Discharge Medications   New Prescriptions    No medications on file         MD Joe Olivas Shaun L, MD  07/20/25 6524

## 2025-07-20 NOTE — ED NOTES
[facility]  ED Nurse Handoff Report    ED Chief complaint: Post-op Problem      ED Diagnosis:   Final diagnoses:   Acute bilateral low back pain with right-sided sciatica   Intractable pain       Code Status: Full Code    Allergies:   Allergies   Allergen Reactions    Oxycodone Itching    Zithromax [Azithromycin] Hives       Patient Story: Back surgery July 3rd and has been having pain since with radiation down right leg, MRI done Saturday and was told to come to ER for surgery to be done on Monday     Focused Assessment:  Pt alert and oriented, pleasant and cooperative. Rates pain up to 8/10 and given 1mg dilaudid IV at time of note.     Treatments and/or interventions provided: Labs, medications, monitoring  Labs Ordered and Resulted from Time of ED Arrival to Time of ED Departure   CBC WITH PLATELETS AND DIFFERENTIAL - Abnormal       Result Value    WBC Count 13.2 (*)     RBC Count 4.31      Hemoglobin 13.2      Hematocrit 39.8      MCV 92      MCH 30.6      MCHC 33.2      RDW 14.5      Platelet Count 316      % Neutrophils 87      % Lymphocytes 10      % Monocytes 3      % Eosinophils 0      % Basophils 0      % Immature Granulocytes 1      NRBCs per 100 WBC 0      Absolute Neutrophils 11.5 (*)     Absolute Lymphocytes 1.3      Absolute Monocytes 0.4      Absolute Eosinophils 0.0      Absolute Basophils 0.0      Absolute Immature Granulocytes 0.1      Absolute NRBCs 0.0     BASIC METABOLIC PANEL       Patient's response to treatments and/or interventions: Tolerating interventions well    To be done/followed up on inpatient unit:  Continue plan of care    Does this patient have any cognitive concerns?: none    Activity level - Baseline/Home:  Independent  Activity Level - Current:   Stand with Assist    Patient's Preferred language: English   Needed?: No    Isolation: None  Infection: Not Applicable  Sepsis treatment initiated: No  Patient tested for COVID 19 prior to admission:  NO  Bariatric?: No    Vital Signs:   Vitals:    07/20/25 1624   BP: (!) 166/81   Pulse: 103   Resp: 16   Temp: 98  F (36.7  C)   SpO2: 97%   Weight: 80.7 kg (178 lb)       Cardiac Rhythm:     Was the PSS-3 completed:   Yes  What interventions are required if any?               Family Comments: Spouse at bedside  OBS brochure/video discussed/provided to patient/family: No              Name of person given brochure if not patient: n/a              Relationship to patient: n/a    For the majority of the shift this patient's behavior was Green.   Behavioral interventions performed were rounding.    ED NURSE PHONE NUMBER: *[phone]

## 2025-07-21 ENCOUNTER — APPOINTMENT (OUTPATIENT)
Dept: GENERAL RADIOLOGY | Facility: CLINIC | Age: 55
End: 2025-07-21
Attending: HOSPITALIST
Payer: COMMERCIAL

## 2025-07-21 ENCOUNTER — ANESTHESIA EVENT (OUTPATIENT)
Dept: SURGERY | Facility: CLINIC | Age: 55
End: 2025-07-21
Payer: COMMERCIAL

## 2025-07-21 ENCOUNTER — ANESTHESIA (OUTPATIENT)
Dept: SURGERY | Facility: CLINIC | Age: 55
End: 2025-07-21
Payer: COMMERCIAL

## 2025-07-21 LAB
ABO + RH BLD: NORMAL
ANION GAP SERPL CALCULATED.3IONS-SCNC: 13 MMOL/L (ref 7–15)
BLD GP AB SCN SERPL QL: NEGATIVE
BUN SERPL-MCNC: 16.7 MG/DL (ref 6–20)
CALCIUM SERPL-MCNC: 9.5 MG/DL (ref 8.8–10.4)
CHLORIDE SERPL-SCNC: 100 MMOL/L (ref 98–107)
CREAT SERPL-MCNC: 0.61 MG/DL (ref 0.51–0.95)
EGFRCR SERPLBLD CKD-EPI 2021: >90 ML/MIN/1.73M2
ERYTHROCYTE [DISTWIDTH] IN BLOOD BY AUTOMATED COUNT: 14.2 % (ref 10–15)
GLUCOSE SERPL-MCNC: 111 MG/DL (ref 70–99)
HCO3 SERPL-SCNC: 26 MMOL/L (ref 22–29)
HCT VFR BLD AUTO: 40 % (ref 35–47)
HGB BLD-MCNC: 13.3 G/DL (ref 11.7–15.7)
MCH RBC QN AUTO: 30.6 PG (ref 26.5–33)
MCHC RBC AUTO-ENTMCNC: 33.3 G/DL (ref 31.5–36.5)
MCV RBC AUTO: 92 FL (ref 78–100)
PLATELET # BLD AUTO: 301 10E3/UL (ref 150–450)
POTASSIUM SERPL-SCNC: 4.4 MMOL/L (ref 3.4–5.3)
RBC # BLD AUTO: 4.34 10E6/UL (ref 3.8–5.2)
SODIUM SERPL-SCNC: 139 MMOL/L (ref 135–145)
SPECIMEN EXP DATE BLD: NORMAL
WBC # BLD AUTO: 14.3 10E3/UL (ref 4–11)

## 2025-07-21 PROCEDURE — 250N000009 HC RX 250: Performed by: STUDENT IN AN ORGANIZED HEALTH CARE EDUCATION/TRAINING PROGRAM

## 2025-07-21 PROCEDURE — 01NB0ZZ RELEASE LUMBAR NERVE, OPEN APPROACH: ICD-10-PCS | Performed by: STUDENT IN AN ORGANIZED HEALTH CARE EDUCATION/TRAINING PROGRAM

## 2025-07-21 PROCEDURE — 250N000013 HC RX MED GY IP 250 OP 250 PS 637

## 2025-07-21 PROCEDURE — 250N000013 HC RX MED GY IP 250 OP 250 PS 637: Performed by: NURSE PRACTITIONER

## 2025-07-21 PROCEDURE — 360N000077 HC SURGERY LEVEL 4, PER MIN: Performed by: STUDENT IN AN ORGANIZED HEALTH CARE EDUCATION/TRAINING PROGRAM

## 2025-07-21 PROCEDURE — 99232 SBSQ HOSP IP/OBS MODERATE 35: CPT | Performed by: HOSPITALIST

## 2025-07-21 PROCEDURE — 85027 COMPLETE CBC AUTOMATED: CPT | Performed by: HOSPITALIST

## 2025-07-21 PROCEDURE — 250N000011 HC RX IP 250 OP 636: Performed by: NURSE PRACTITIONER

## 2025-07-21 PROCEDURE — 00NY0ZZ RELEASE LUMBAR SPINAL CORD, OPEN APPROACH: ICD-10-PCS | Performed by: STUDENT IN AN ORGANIZED HEALTH CARE EDUCATION/TRAINING PROGRAM

## 2025-07-21 PROCEDURE — 258N000003 HC RX IP 258 OP 636: Performed by: ANESTHESIOLOGY

## 2025-07-21 PROCEDURE — 80048 BASIC METABOLIC PNL TOTAL CA: CPT | Performed by: HOSPITALIST

## 2025-07-21 PROCEDURE — 710N000009 HC RECOVERY PHASE 1, LEVEL 1, PER MIN: Performed by: STUDENT IN AN ORGANIZED HEALTH CARE EDUCATION/TRAINING PROGRAM

## 2025-07-21 PROCEDURE — 86900 BLOOD TYPING SEROLOGIC ABO: CPT | Performed by: ANESTHESIOLOGY

## 2025-07-21 PROCEDURE — 250N000011 HC RX IP 250 OP 636: Performed by: NURSE ANESTHETIST, CERTIFIED REGISTERED

## 2025-07-21 PROCEDURE — 63030 LAMOT DCMPRN NRV RT 1 LMBR: CPT | Mod: 78 | Performed by: STUDENT IN AN ORGANIZED HEALTH CARE EDUCATION/TRAINING PROGRAM

## 2025-07-21 PROCEDURE — 250N000011 HC RX IP 250 OP 636: Performed by: ANESTHESIOLOGY

## 2025-07-21 PROCEDURE — 258N000003 HC RX IP 258 OP 636: Performed by: NURSE PRACTITIONER

## 2025-07-21 PROCEDURE — 250N000013 HC RX MED GY IP 250 OP 250 PS 637: Performed by: HOSPITALIST

## 2025-07-21 PROCEDURE — 250N000025 HC SEVOFLURANE, PER MIN: Performed by: STUDENT IN AN ORGANIZED HEALTH CARE EDUCATION/TRAINING PROGRAM

## 2025-07-21 PROCEDURE — 999N000063 XR LUMBAR SPINE PORT 1 VIEW

## 2025-07-21 PROCEDURE — 99254 IP/OBS CNSLTJ NEW/EST MOD 60: CPT

## 2025-07-21 PROCEDURE — 120N000001 HC R&B MED SURG/OB

## 2025-07-21 PROCEDURE — 250N000011 HC RX IP 250 OP 636: Performed by: STUDENT IN AN ORGANIZED HEALTH CARE EDUCATION/TRAINING PROGRAM

## 2025-07-21 PROCEDURE — 0SB40ZZ EXCISION OF LUMBOSACRAL DISC, OPEN APPROACH: ICD-10-PCS | Performed by: STUDENT IN AN ORGANIZED HEALTH CARE EDUCATION/TRAINING PROGRAM

## 2025-07-21 PROCEDURE — 250N000011 HC RX IP 250 OP 636: Performed by: HOSPITALIST

## 2025-07-21 PROCEDURE — 250N000009 HC RX 250: Performed by: NURSE ANESTHETIST, CERTIFIED REGISTERED

## 2025-07-21 PROCEDURE — 370N000017 HC ANESTHESIA TECHNICAL FEE, PER MIN: Performed by: STUDENT IN AN ORGANIZED HEALTH CARE EDUCATION/TRAINING PROGRAM

## 2025-07-21 PROCEDURE — 36415 COLL VENOUS BLD VENIPUNCTURE: CPT | Performed by: HOSPITALIST

## 2025-07-21 PROCEDURE — 999N000141 HC STATISTIC PRE-PROCEDURE NURSING ASSESSMENT: Performed by: STUDENT IN AN ORGANIZED HEALTH CARE EDUCATION/TRAINING PROGRAM

## 2025-07-21 PROCEDURE — 258N000003 HC RX IP 258 OP 636: Performed by: NURSE ANESTHETIST, CERTIFIED REGISTERED

## 2025-07-21 PROCEDURE — 272N000001 HC OR GENERAL SUPPLY STERILE: Performed by: STUDENT IN AN ORGANIZED HEALTH CARE EDUCATION/TRAINING PROGRAM

## 2025-07-21 RX ORDER — DEXAMETHASONE SODIUM PHOSPHATE 4 MG/ML
4 INJECTION, SOLUTION INTRA-ARTICULAR; INTRALESIONAL; INTRAMUSCULAR; INTRAVENOUS; SOFT TISSUE
Status: DISCONTINUED | OUTPATIENT
Start: 2025-07-21 | End: 2025-07-21 | Stop reason: HOSPADM

## 2025-07-21 RX ORDER — BUPIVACAINE HCL/EPINEPHRINE 0.5-1:200K
VIAL (ML) INJECTION PRN
Status: DISCONTINUED | OUTPATIENT
Start: 2025-07-21 | End: 2025-07-21 | Stop reason: HOSPADM

## 2025-07-21 RX ORDER — ACETAMINOPHEN 325 MG/1
975 TABLET ORAL ONCE
Status: COMPLETED | OUTPATIENT
Start: 2025-07-21 | End: 2025-07-21

## 2025-07-21 RX ORDER — LABETALOL HYDROCHLORIDE 5 MG/ML
10 INJECTION, SOLUTION INTRAVENOUS
Status: COMPLETED | OUTPATIENT
Start: 2025-07-21 | End: 2025-07-21

## 2025-07-21 RX ORDER — PROPOFOL 10 MG/ML
INJECTION, EMULSION INTRAVENOUS CONTINUOUS PRN
Status: DISCONTINUED | OUTPATIENT
Start: 2025-07-21 | End: 2025-07-21

## 2025-07-21 RX ORDER — HYDROMORPHONE HCL IN WATER/PF 6 MG/30 ML
0.4 PATIENT CONTROLLED ANALGESIA SYRINGE INTRAVENOUS EVERY 5 MIN PRN
Status: DISCONTINUED | OUTPATIENT
Start: 2025-07-21 | End: 2025-07-21 | Stop reason: HOSPADM

## 2025-07-21 RX ORDER — CEFAZOLIN SODIUM/WATER 2 G/20 ML
2 SYRINGE (ML) INTRAVENOUS SEE ADMIN INSTRUCTIONS
Status: DISCONTINUED | OUTPATIENT
Start: 2025-07-21 | End: 2025-07-21 | Stop reason: HOSPADM

## 2025-07-21 RX ORDER — SODIUM CHLORIDE 9 MG/ML
INJECTION, SOLUTION INTRAVENOUS CONTINUOUS
Status: DISCONTINUED | OUTPATIENT
Start: 2025-07-21 | End: 2025-07-22 | Stop reason: HOSPADM

## 2025-07-21 RX ORDER — ONDANSETRON 4 MG/1
4 TABLET, ORALLY DISINTEGRATING ORAL EVERY 30 MIN PRN
Status: DISCONTINUED | OUTPATIENT
Start: 2025-07-21 | End: 2025-07-21 | Stop reason: HOSPADM

## 2025-07-21 RX ORDER — KETAMINE HYDROCHLORIDE 10 MG/ML
INJECTION INTRAMUSCULAR; INTRAVENOUS PRN
Status: DISCONTINUED | OUTPATIENT
Start: 2025-07-21 | End: 2025-07-21

## 2025-07-21 RX ORDER — FENTANYL CITRATE 0.05 MG/ML
50 INJECTION, SOLUTION INTRAMUSCULAR; INTRAVENOUS EVERY 5 MIN PRN
Status: DISCONTINUED | OUTPATIENT
Start: 2025-07-21 | End: 2025-07-21 | Stop reason: HOSPADM

## 2025-07-21 RX ORDER — HYDROXYZINE HYDROCHLORIDE 25 MG/1
25 TABLET, FILM COATED ORAL EVERY 6 HOURS PRN
Status: DISCONTINUED | OUTPATIENT
Start: 2025-07-21 | End: 2025-07-21 | Stop reason: HOSPADM

## 2025-07-21 RX ORDER — ONDANSETRON 2 MG/ML
4 INJECTION INTRAMUSCULAR; INTRAVENOUS EVERY 30 MIN PRN
Status: DISCONTINUED | OUTPATIENT
Start: 2025-07-21 | End: 2025-07-21 | Stop reason: HOSPADM

## 2025-07-21 RX ORDER — DEXAMETHASONE SODIUM PHOSPHATE 4 MG/ML
INJECTION, SOLUTION INTRA-ARTICULAR; INTRALESIONAL; INTRAMUSCULAR; INTRAVENOUS; SOFT TISSUE PRN
Status: DISCONTINUED | OUTPATIENT
Start: 2025-07-21 | End: 2025-07-21

## 2025-07-21 RX ORDER — NALOXONE HYDROCHLORIDE 0.4 MG/ML
0.1 INJECTION, SOLUTION INTRAMUSCULAR; INTRAVENOUS; SUBCUTANEOUS
Status: DISCONTINUED | OUTPATIENT
Start: 2025-07-21 | End: 2025-07-21 | Stop reason: HOSPADM

## 2025-07-21 RX ORDER — FENTANYL CITRATE 50 UG/ML
INJECTION, SOLUTION INTRAMUSCULAR; INTRAVENOUS PRN
Status: DISCONTINUED | OUTPATIENT
Start: 2025-07-21 | End: 2025-07-21

## 2025-07-21 RX ORDER — METHOCARBAMOL 500 MG/1
500 TABLET, FILM COATED ORAL EVERY 6 HOURS
Status: DISCONTINUED | OUTPATIENT
Start: 2025-07-21 | End: 2025-07-22 | Stop reason: HOSPADM

## 2025-07-21 RX ORDER — HYDROMORPHONE HYDROCHLORIDE 2 MG/1
2-4 TABLET ORAL EVERY 4 HOURS PRN
Refills: 0 | Status: DISCONTINUED | OUTPATIENT
Start: 2025-07-21 | End: 2025-07-22 | Stop reason: HOSPADM

## 2025-07-21 RX ORDER — LIDOCAINE 4 G/G
2 PATCH TOPICAL
Status: DISCONTINUED | OUTPATIENT
Start: 2025-07-21 | End: 2025-07-22 | Stop reason: HOSPADM

## 2025-07-21 RX ORDER — HYDROMORPHONE HCL IN WATER/PF 6 MG/30 ML
0.2 PATIENT CONTROLLED ANALGESIA SYRINGE INTRAVENOUS EVERY 5 MIN PRN
Status: DISCONTINUED | OUTPATIENT
Start: 2025-07-21 | End: 2025-07-21 | Stop reason: HOSPADM

## 2025-07-21 RX ORDER — PROPOFOL 10 MG/ML
INJECTION, EMULSION INTRAVENOUS PRN
Status: DISCONTINUED | OUTPATIENT
Start: 2025-07-21 | End: 2025-07-21

## 2025-07-21 RX ORDER — HYDRALAZINE HYDROCHLORIDE 20 MG/ML
2.5-5 INJECTION INTRAMUSCULAR; INTRAVENOUS EVERY 10 MIN PRN
Status: DISCONTINUED | OUTPATIENT
Start: 2025-07-21 | End: 2025-07-21 | Stop reason: HOSPADM

## 2025-07-21 RX ORDER — HYDROMORPHONE HYDROCHLORIDE 2 MG/1
4 TABLET ORAL EVERY 4 HOURS PRN
Refills: 0 | Status: DISCONTINUED | OUTPATIENT
Start: 2025-07-21 | End: 2025-07-22 | Stop reason: HOSPADM

## 2025-07-21 RX ORDER — SODIUM CHLORIDE, SODIUM LACTATE, POTASSIUM CHLORIDE, CALCIUM CHLORIDE 600; 310; 30; 20 MG/100ML; MG/100ML; MG/100ML; MG/100ML
INJECTION, SOLUTION INTRAVENOUS CONTINUOUS
Status: DISCONTINUED | OUTPATIENT
Start: 2025-07-21 | End: 2025-07-21 | Stop reason: HOSPADM

## 2025-07-21 RX ORDER — CEFAZOLIN SODIUM 2 G/50ML
2 SOLUTION INTRAVENOUS EVERY 8 HOURS
Status: COMPLETED | OUTPATIENT
Start: 2025-07-21 | End: 2025-07-22

## 2025-07-21 RX ORDER — HYDROMORPHONE HCL IN WATER/PF 6 MG/30 ML
0.4 PATIENT CONTROLLED ANALGESIA SYRINGE INTRAVENOUS
Status: DISCONTINUED | OUTPATIENT
Start: 2025-07-21 | End: 2025-07-22

## 2025-07-21 RX ORDER — LIDOCAINE HYDROCHLORIDE 20 MG/ML
INJECTION, SOLUTION INFILTRATION; PERINEURAL PRN
Status: DISCONTINUED | OUTPATIENT
Start: 2025-07-21 | End: 2025-07-21

## 2025-07-21 RX ORDER — LIDOCAINE 40 MG/G
CREAM TOPICAL
Status: DISCONTINUED | OUTPATIENT
Start: 2025-07-21 | End: 2025-07-22 | Stop reason: HOSPADM

## 2025-07-21 RX ORDER — GLYCOPYRROLATE 0.2 MG/ML
INJECTION, SOLUTION INTRAMUSCULAR; INTRAVENOUS PRN
Status: DISCONTINUED | OUTPATIENT
Start: 2025-07-21 | End: 2025-07-21

## 2025-07-21 RX ORDER — FENTANYL CITRATE 0.05 MG/ML
25 INJECTION, SOLUTION INTRAMUSCULAR; INTRAVENOUS EVERY 5 MIN PRN
Status: DISCONTINUED | OUTPATIENT
Start: 2025-07-21 | End: 2025-07-21 | Stop reason: HOSPADM

## 2025-07-21 RX ORDER — GABAPENTIN 300 MG/1
300 CAPSULE ORAL
Status: COMPLETED | OUTPATIENT
Start: 2025-07-21 | End: 2025-07-21

## 2025-07-21 RX ORDER — CEFAZOLIN SODIUM/WATER 2 G/20 ML
2 SYRINGE (ML) INTRAVENOUS
Status: COMPLETED | OUTPATIENT
Start: 2025-07-21 | End: 2025-07-21

## 2025-07-21 RX ORDER — HYDROMORPHONE HYDROCHLORIDE 2 MG/1
2-4 TABLET ORAL EVERY 4 HOURS PRN
Status: DISCONTINUED | OUTPATIENT
Start: 2025-07-21 | End: 2025-07-21

## 2025-07-21 RX ORDER — ONDANSETRON 2 MG/ML
INJECTION INTRAMUSCULAR; INTRAVENOUS PRN
Status: DISCONTINUED | OUTPATIENT
Start: 2025-07-21 | End: 2025-07-21

## 2025-07-21 RX ORDER — HYDRALAZINE HYDROCHLORIDE 20 MG/ML
5 INJECTION INTRAMUSCULAR; INTRAVENOUS EVERY 6 HOURS PRN
Status: DISCONTINUED | OUTPATIENT
Start: 2025-07-21 | End: 2025-07-22 | Stop reason: HOSPADM

## 2025-07-21 RX ADMIN — HYDROMORPHONE HYDROCHLORIDE 0.2 MG: 0.2 INJECTION, SOLUTION INTRAMUSCULAR; INTRAVENOUS; SUBCUTANEOUS at 12:28

## 2025-07-21 RX ADMIN — Medication 20 MG: at 09:46

## 2025-07-21 RX ADMIN — Medication 10 MG: at 09:34

## 2025-07-21 RX ADMIN — Medication 150 MG: at 10:30

## 2025-07-21 RX ADMIN — GABAPENTIN 300 MG: 300 CAPSULE ORAL at 08:08

## 2025-07-21 RX ADMIN — Medication 20 MG: at 09:15

## 2025-07-21 RX ADMIN — ONDANSETRON 4 MG: 2 INJECTION INTRAMUSCULAR; INTRAVENOUS at 10:22

## 2025-07-21 RX ADMIN — Medication 2 G: at 08:51

## 2025-07-21 RX ADMIN — HYDROMORPHONE HYDROCHLORIDE 4 MG: 2 TABLET ORAL at 19:09

## 2025-07-21 RX ADMIN — CEFAZOLIN SODIUM 2 G: 2 SOLUTION INTRAVENOUS at 17:46

## 2025-07-21 RX ADMIN — MIDAZOLAM 2 MG: 1 INJECTION INTRAMUSCULAR; INTRAVENOUS at 08:36

## 2025-07-21 RX ADMIN — FENTANYL CITRATE 50 MCG: 50 INJECTION INTRAMUSCULAR; INTRAVENOUS at 09:42

## 2025-07-21 RX ADMIN — ROCURONIUM BROMIDE 50 MG: 50 INJECTION, SOLUTION INTRAVENOUS at 08:46

## 2025-07-21 RX ADMIN — ACETAMINOPHEN 975 MG: 325 TABLET ORAL at 06:34

## 2025-07-21 RX ADMIN — PHENYLEPHRINE HYDROCHLORIDE 0.5 MCG/KG/MIN: 10 INJECTION INTRAVENOUS at 09:22

## 2025-07-21 RX ADMIN — HYDROMORPHONE HYDROCHLORIDE 0.2 MG: 0.2 INJECTION, SOLUTION INTRAMUSCULAR; INTRAVENOUS; SUBCUTANEOUS at 11:53

## 2025-07-21 RX ADMIN — ACETAMINOPHEN 975 MG: 325 TABLET ORAL at 21:28

## 2025-07-21 RX ADMIN — GLYCOPYRROLATE 0.2 MG: 0.2 INJECTION, SOLUTION INTRAMUSCULAR; INTRAVENOUS at 09:19

## 2025-07-21 RX ADMIN — SODIUM CHLORIDE, SODIUM LACTATE, POTASSIUM CHLORIDE, AND CALCIUM CHLORIDE: .6; .31; .03; .02 INJECTION, SOLUTION INTRAVENOUS at 08:08

## 2025-07-21 RX ADMIN — HYDROMORPHONE HYDROCHLORIDE 0.4 MG: 0.2 INJECTION, SOLUTION INTRAMUSCULAR; INTRAVENOUS; SUBCUTANEOUS at 11:29

## 2025-07-21 RX ADMIN — HYDROMORPHONE HYDROCHLORIDE 2 MG: 2 TABLET ORAL at 04:30

## 2025-07-21 RX ADMIN — PROPOFOL 30 MG: 10 INJECTION, EMULSION INTRAVENOUS at 09:44

## 2025-07-21 RX ADMIN — PHENYLEPHRINE HYDROCHLORIDE 100 MCG: 10 INJECTION INTRAVENOUS at 09:11

## 2025-07-21 RX ADMIN — PROPOFOL 20 MG: 10 INJECTION, EMULSION INTRAVENOUS at 10:07

## 2025-07-21 RX ADMIN — DEXAMETHASONE SODIUM PHOSPHATE 10 MG: 4 INJECTION, SOLUTION INTRA-ARTICULAR; INTRALESIONAL; INTRAMUSCULAR; INTRAVENOUS; SOFT TISSUE at 09:09

## 2025-07-21 RX ADMIN — HYDROMORPHONE HYDROCHLORIDE 0.5 MG: 1 INJECTION, SOLUTION INTRAMUSCULAR; INTRAVENOUS; SUBCUTANEOUS at 06:41

## 2025-07-21 RX ADMIN — VENLAFAXINE HYDROCHLORIDE 150 MG: 75 CAPSULE, EXTENDED RELEASE ORAL at 21:28

## 2025-07-21 RX ADMIN — PHENYLEPHRINE HYDROCHLORIDE 100 MCG: 10 INJECTION INTRAVENOUS at 10:14

## 2025-07-21 RX ADMIN — LIDOCAINE 2 PATCH: 4 PATCH TOPICAL at 21:28

## 2025-07-21 RX ADMIN — FENTANYL CITRATE 50 MCG: 50 INJECTION INTRAMUSCULAR; INTRAVENOUS at 08:43

## 2025-07-21 RX ADMIN — HYDROMORPHONE HYDROCHLORIDE 0.2 MG: 0.2 INJECTION, SOLUTION INTRAMUSCULAR; INTRAVENOUS; SUBCUTANEOUS at 12:07

## 2025-07-21 RX ADMIN — SODIUM CHLORIDE, SODIUM LACTATE, POTASSIUM CHLORIDE, AND CALCIUM CHLORIDE: .6; .31; .03; .02 INJECTION, SOLUTION INTRAVENOUS at 10:44

## 2025-07-21 RX ADMIN — FENTANYL CITRATE 50 MCG: 50 INJECTION, SOLUTION INTRAMUSCULAR; INTRAVENOUS at 11:17

## 2025-07-21 RX ADMIN — ONDANSETRON 4 MG: 2 INJECTION, SOLUTION INTRAMUSCULAR; INTRAVENOUS at 12:26

## 2025-07-21 RX ADMIN — LIDOCAINE HYDROCHLORIDE 100 MG: 20 INJECTION, SOLUTION INFILTRATION; PERINEURAL at 08:46

## 2025-07-21 RX ADMIN — METHOCARBAMOL 500 MG: 500 TABLET ORAL at 15:35

## 2025-07-21 RX ADMIN — METHOCARBAMOL 500 MG: 500 TABLET ORAL at 06:35

## 2025-07-21 RX ADMIN — HYDROMORPHONE HYDROCHLORIDE 0.5 MG: 1 INJECTION, SOLUTION INTRAMUSCULAR; INTRAVENOUS; SUBCUTANEOUS at 02:47

## 2025-07-21 RX ADMIN — SODIUM CHLORIDE: 0.9 INJECTION, SOLUTION INTRAVENOUS at 14:14

## 2025-07-21 RX ADMIN — PROPOFOL 200 MG: 10 INJECTION, EMULSION INTRAVENOUS at 08:46

## 2025-07-21 RX ADMIN — PROPOFOL 50 MCG/KG/MIN: 10 INJECTION, EMULSION INTRAVENOUS at 09:05

## 2025-07-21 RX ADMIN — PHENYLEPHRINE HYDROCHLORIDE 100 MCG: 10 INJECTION INTRAVENOUS at 09:19

## 2025-07-21 RX ADMIN — FENTANYL CITRATE 50 MCG: 50 INJECTION, SOLUTION INTRAMUSCULAR; INTRAVENOUS at 11:24

## 2025-07-21 RX ADMIN — LABETALOL HYDROCHLORIDE 10 MG: 5 INJECTION INTRAVENOUS at 11:36

## 2025-07-21 RX ADMIN — HYDROMORPHONE HYDROCHLORIDE 2 MG: 2 TABLET ORAL at 00:27

## 2025-07-21 RX ADMIN — METHOCARBAMOL 500 MG: 500 TABLET ORAL at 21:28

## 2025-07-21 RX ADMIN — ACETAMINOPHEN 975 MG: 325 TABLET ORAL at 15:35

## 2025-07-21 ASSESSMENT — ACTIVITIES OF DAILY LIVING (ADL)
ADLS_ACUITY_SCORE: 59

## 2025-07-21 ASSESSMENT — LIFESTYLE VARIABLES: TOBACCO_USE: 1

## 2025-07-21 NOTE — PROGRESS NOTES
Patient ambulation status: Ax1. GB walker   Patient able to stand for X-ray: Yes  Standing/upright x-ray complete: No  Patient using oral analgesics:yes  Voiding spontaneously:yes  Drains discontinued:yes  Incision clean and dry:yes  Bowel status:Due for BM

## 2025-07-21 NOTE — BRIEF OP NOTE
"   [facility]    Brief Operative Note    Pre-operative diagnosis: Recurrent herniation of lumbar disc [M51.26]  Post-operative diagnosis Same as pre-operative diagnosis    Procedure: REDO LUMBAR 5 TO SACRAL 1 RIGHT MICRODISCECTOMY, Bilateral - Spine    Surgeon: Surgeons and Role:     * Ryan Atkinson MD - Primary  Anesthesia: General   Estimated Blood Loss: 20 ml     Drains: Zafar-Shaikh  Specimens: * No specimens in log *  Findings:   Significant recurrent disc herniation leading to compression of thecal sac and outgoing L5 nerve root. By end of case, there was no residual compression of thecal sac or right L5 nerve root. Sufficient decompression was achieved via right sided approach and thus, the risk of a left sided hemilaminectomy was not deemed necessary. No CSF leak encountered throughout the operation.  Complications: None.  Implants: * No implants in log *    Incision: closed with absorbable Monocryl. Remove superficial dressing on 7/24/25. Keep open to air thereafter. Allow steri-strips to fall off. Ok to shower on 7/24/25.      Activity: no excess bending at waist, twisting at waist, or lifting > 10 pounds     Ok to discharge pending:   Oral diet tolerance  Ambulating at baseline  Voiding without a staples  Pain is controlled on orals  Drain less < 30 ml/shift of output  3 doses of ancef for prophylaxis of surgical site     Ryan \"Florin\" MD [place]    of Neurosurgery, [facility]   [facility]          "

## 2025-07-21 NOTE — OP NOTE
PATIENT NAME: Kacy Herrera  PATIENT MRN: [id number]  PATIENT ACCOUNT: [id number]  PATIENT YOB: 1970      NEUROSURGERY OPERATIVE REPORT     DATE OF SURGERY: 07/21/25     PREOPERATIVE DIAGNOSIS:  1. Recurrent L5-S1 disc herniation resulting in right L5 radiculopathy     POSTOPERATIVE DIAGNOSIS:  1. Recurrent L5-S1 disc herniation resulting in right L5 radiculopathy     PROCEDURES PERFORMED:  1. Right sided redo L5-S1 microdiscectomy     STAFF SURGEON: Ryan Atkinson MD     ASSISTANT: none      ANESTHESIA: General endotracheal anesthesia     ESTIMATED BLOOD LOSS: 20 mL     IMPLANTS: none     EXPLANTS: none     DRAINS: 7 Peruvian flat BASSAM in subfascial space      FINDINGS:   Significant recurrent disc herniation leading to compression of thecal sac and outgoing L5 nerve root. By end of case, there was no residual compression of thecal sac or right L5 nerve root. Sufficient decompression was achieved via right sided approach and thus, the risk of a left sided hemilaminectomy was not deemed necessary. No CSF leak encountered throughout the operation.      COMPLICATIONS: none immediate     INDICATIONS: Kacy Herrera is a 55 year old female with a history of a prior left L5-S1 microdiscectomy. She underwent a right sided surgery on 7/3/25 that was uncomplicated for right sided radiculopathy. She began to experience recurrent right sided symptoms post-operatively prompting repeat imaging that revealed concerns of a recurrent disc herniation. For these reasons, she was offered the above stated operation. She provided written and verbal consent to proceed after a discussion of risks, alternatives, and benefits.      DESCRIPTION OF PROCEDURE:  Kacy Herrera was brought to the operating room. Her identity was verified. The patient was transferred in the prone position to the operating table with a Jorge frame after vascular access was established and general endotracheal anesthesia was obtained. Both arms  were placed on arm boards. The patient's head was placed on a proneview. Preoperative antibiotics were administered. All pressure points were padded. The patient was cleaned, prepared, and draped in sterile fashion after obtaining an X-ray for localization, which demonstrated that her prior surgical site incision correlated to the appropriate vertebral body. Timeout was performed. Local anesthetic was injected.      The patient's prior incision at her lumbar midline was incised using a #15 blade. Prior sutures were removed. A valdez was used to gain access to the pre-existing right-sided hemilaminectomy.  A sterile operating microscope was then used to perform the remainder of the operation. The prior defect site was identified. No additional hemilaminectomy or facetectomy was required to gain access to the thecal sac and nerve root. There was significant recurrent disc herniation at the medial inferior aspect of the right L5 nerve root leading to compression of both the thecal sac and nerve root. Pituitary rongeurs were used to resect the disc herniation which was very large and came out in multiple fragments. It appeared most of the disc fragments came from the annular defect on the right side as disc was resected. The disc space was entered and additional disc removed. A confirmatory X-ray demarcating the caudal and rostral limits of exposure was obtained.     Various upgoing and downgoing pituitary rongeurs in addition to various upgoing and downgoing curettes were used to resect and mobilize disc fragments. I was able to visually verify that no additional disc fragments were compressive onto the thecal sac or the right sided outgoing L5 nerve root using a ball hook at the end of the operation. Epidural hemostasis was obtained using bipolar cautery and SurgiFlo.      Next, a 7 Peruvian flat BASSAM drain was placed in the sub-fascial space and tunneled under her skin. Thereafter, we closed in layers with 0-0 Vicryl for  the fascia, 2-0 Vicryl for the sub-dermal layer, and 3-0 Monocryl for the skin. The drain was secured to the skin using nylon suture. A Zafar Shaikh bulb was attached to the drain catheter and placed to suction.  Sterile dressings were applied. The patient was extubated and returned to the post-operative anesthesia care unit without incident. At the end of the procedure, sponge and needle counts were correct. Estimated blood loss totaled 20 ml.    Ryan Atkinson MD

## 2025-07-21 NOTE — PROGRESS NOTES
Pt arrived to this floor at ~ 7 pm. A&O. 2 nurses skin check done. VSS ex BP slightly high, RA. Night shift nurse to follow up.

## 2025-07-21 NOTE — ANESTHESIA PROCEDURE NOTES
Airway       Patient location during procedure: OR       Procedure Start/Stop Times: 7/21/2025 8:49 AM  Staff -        Anesthesiologist:  Dallin Majano MD       CRNA: Jessica Mcgill APRN CRNA       Performed By: CRNA  Consent for Airway        Urgency: elective  Indications and Patient Condition       Indications for airway management: juan r-procedural       Induction type:intravenous       Mask difficulty assessment: 1 - vent by mask    Final Airway Details       Final airway type: endotracheal airway       Successful airway: ETT - single  Endotracheal Airway Details        ETT size (mm): 7.0       Cuffed: yes       Successful intubation technique: video laryngoscopy       VL Blade Size: Glidescope 3       Grade View of Cords: 1       Adjucts: stylet       Position: Left       Measured from: lips       Secured at (cm): 21       Bite block used: None    Post intubation assessment        Placement verified by: capnometry, equal breath sounds and chest rise        Number of attempts at approach: 1       Secured with: commercial tube britt and tape       Ease of procedure: easy       Dentition: Intact and Unchanged    Medication(s) Administered   Medication Administration Time: 7/21/2025 8:49 AM

## 2025-07-21 NOTE — CONSULTS
[facility] ACUTE INPATIENT PAIN SERVICE    [facility], [facility], [facility], [facility], [place]   PAIN CONSULT        ASSESSMENT/ PLAN:  Pain is consistent with acute post operative pain status post redo L5-S1 right microdiscectomy with Dr. Atkinson on 7/21.      Multimodal Medication Therapy:    Adjuvants:   Acetaminophen 975mg q8h   Prednisone 20mg bid x2 doses  Robaxin 500mg scheduled q6h   Add lidocaine patches x2   Opioids:   Dilaudid 2-4mg q4h prn - first line.   Consider rotation to oxycodone 5-10mg if sedation noted  IV dilaudid 0.2-0.4mg q2h prn - second line for severe pain after PO medications  Non-medication interventions- Ice, PT  Constipation Prophylaxis- scheduled miralax, prn senna   Follow up /Discharge Recommendations - We recommend prescribing the following at the time of discharge:  TBD      Subjective:  Kacy is seen resting in bed in no acute distress, multiple family members at the bedside. She recently returned to the unit from her procedure. She is currently reporting 7/10 pain to the surgical site. Reports this is much improved compared to prior to admission. Endorsing some nausea. No vomiting, diarrhea, constipation, chest pain, shortness of breath.     Reviewed plan outlined above, patient and family members at bedside verbalized understanding and are in agreement with the plan.     HPI:  Kacy Herrera is a 55 year old female who was admitted on 7/20/2025.  Pain team was asked by Dr. Marcio Cornejo to see the patient for intractable back pain. Admitted after presenting to the ED following outpatient MRI showing large recurrent right paracentral L5-S1 disc extrusion with cephalad migration of the disc material and severe right foraminal and subarticular recess stenosis and moderate to severe canal stenosis.  Neurosurgery consulted and redo L5-S1 right microdiscectomy completed 7/21/25. History of lumbar back pain with radiculopathy, chronic pain of the left knee, prior L5-S1 micro  discectomy on 7/3.    shows former gabapentin, various opioids. Describes pain as 7/10 to the lower back and improving compared to prior to admission.      No significant history of routine opioid use. Reports primarily taking tylenol and ibuprofen for pain management.      PDMP RESULTS:   -07/18/25 hydromorphone 2mg #20 for 4 days   -07/16/25 tramadol 50mg #20 for 7 days   -07/04/25 hydromorphone 2mg #40 for 4 days  -05/29/25 lorazepam 1mg #2 for 1 day    History   Drug Use No         Tobacco Use      Smoking status: Every Day        Packs/day: 0.50        Years: 0.5 packs/day for 15.0 years (7.5 ttl pk-yrs)        Types: Cigarettes      Smokeless tobacco: Never        Objective:  Vital signs in last 24 hours:  B/P: 139/90, T: 97.9, P: 84, R: 14   Blood pressure (!) 139/90, pulse 84, temperature 97.9  F (36.6  C), temperature source Oral, resp. rate 14, weight 80.7 kg (178 lb), SpO2 95%.        Review of Systems:   As per subjective, all others negative.    Physical Exam    General: in no apparent distress and non-toxic, resting in bed, visitors at bedside  HEENT: Head normocephalic atraumatic, oral mucosa moist. Sclerae anicteric  Resp: Respirations unlabored, on oxygen via nasal cannula   Skin: No rashes or lesions to visualized skin   Extremities: No peripheral edema, able to move all four extremities spontaneously   Psych: Normal affect, pleasant  Neuro: Alert and oriented x3      Please see A&P for additional details of medical decision making.  MANAGEMENT DISCUSSED with the following over the past 24 hours:   -Hospitalist updated on adjustments made to plan   NOTE(S)/MEDICAL RECORDS REVIEWED over the past 24 hours:   -Neurosurgery procedure reviewed   -Hospitalist PMH, HPI reviewed  Tests REVIEWED in the past 24 hours:  - BMP  - CBC  Medical complexity over the past 24 hours:  - Parenteral (IV) CONTROLLED SUBSTANCES ordered  - Prescription DRUG MANAGEMENT performed         TIMMY Denney-BC  Acute  Care Pain Management Program   Hours of pain coverage Mon-Fri [id number], afterhours please call the primary team  [facility] ([place], [place], [place], [facility])   Page via Epic Messaging or Vocera text web console -[email or web address]

## 2025-07-21 NOTE — PROGRESS NOTES
[facility]    Medicine Progress Note - Hospitalist Service    Date of Admission:  7/20/2025    Assessment & Plan     Kacy Herrera is a 55 year old female who medical history which includes lumbar back pain with radiculopathy, chronic pain of the left knee, depression, prior history of left L5-S1 micro discectomy and on 7/3 she underwent right sided L5-S1 hemilaminectomy, medial facetectomy and microdiscectomy  with Dr. Atkinson and was discharged home on Medrol Dosepak and pain medication and after surgical intervention she has been having increased pain and stopped taking Dilaudid which made her groggy and wa and  taking Tylenol and ibuprofen and started taking tramadol which did not help and underwent MRI s as outpatient on 7/19 which showed large recurrent right paracentral L5-S1 text disc fusion with cephalad migration of the disc material and severe right foraminal and subarticular recess stenosis and moderate to severe canal stenosis  Came to the ED with 10/ 10 pain and admitted on 7/20/2025        Intractable back pain s/p redo L5-S1 right microdiscectomy on 7/21/25  Status post L5-S1 hemilaminectomy, medial facetectomy and microdiscectomy  with Dr. Atkinson on 7/3/25   History of left L5-S1 microdiscectomy     -Of note patient underwent above operative intervention and after operative intervention she has been having difficulty with pain and nausea and has been trying different medications with less relief  - MRI as outpatient on 7/19 showed large recurrent right paracentral L5-S1 text disc fusion with cephalad migration of the disc material and severe right foraminal and subarticular recess stenosis and moderate to severe canal stenosis  -Neurosurgery was contacted in the ED and  patient admitted for surgical intervention as above  -pain management consulted  -Postop care including DVT prophylaxis per the surgical team  - Continue with pain control     Mild leukocytosis likely due to  stress response and prednisone use  - No evidence of any infection     Depression  -Continue with PTA venlafaxine     Family communication   -d/w  and other family members after she gave permission and questions answered           Diet: NPO for Procedure/Surgery per Anesthesia Guidelines Except for: Meds; Clear liquids before procedure/surgery: ADULT (Age GREATER than or Equal to 18 years) - Clear liquids 2 hours before procedure/surgery    DVT Prophylaxis: Pneumatic Compression Devices  Garza Catheter: Not present  Lines: None     Cardiac Monitoring: ACTIVE order. Indication: Procedural area  Code Status: Full Code      Clinically Significant Risk Factors Present on Admission          # Hypochloremia: Lowest Cl = 97 mmol/L in last 2 days, will monitor as appropriate                               Social Drivers of Health    Tobacco Use: High Risk (7/21/2025)    Patient History     Smoking Tobacco Use: Every Day     Smokeless Tobacco Use: Never          Disposition Plan     Medically Ready for Discharge: Anticipated in 2-4 Days will need to see how she is doing after operative intervention,              Marcio Cornejo MD  Hospitalist Service  [facility]  Securely message with [employer] (more info)  Text page via [employer] Paging/Directory     This note was completed in part using dictation via the Dragon voice recognition software. Some word and grammatical errors may occur and must be interpreted in the appropriate clinical context. If there are any questions pertaining to this issue, please contact me for further clarification.      I am off service from tomorrow morning and her care will be taken over by one of my colleagues from hospital medicine team  ______________________________________________________________________    Interval History     - Seen after the operative intervention and has mild pain but denies any fever, chills, nausea vomiting  -d/w rn marysol    Physical Exam   Vital  Signs: Temp: 97.9  F (36.6  C) Temp src: Oral BP: (!) 139/90 Pulse: 84   Resp: 14 SpO2: 95 % O2 Device: None (Room air)    Weight: 178 lbs 0 oz    General: AO x 3  HEENT: Head is atraumatic, normocephalic.  Moist oral mucosa  Neck: Neck is supple   Respiratory: Lungs are clear to auscultation bilaterally with no wheeze or crackles   Cardiovascular: Regular rate , S1 and S2 normal with no murmer or rubs or gallops  Abdomen:   soft , non tender , non distended and bowel sound present   Skin: No skin rashes or lesions to inspection or palpation.  Neurologic: No focal deficit  Musculoskeletal: Normal Range of motion over upper and lower extremities bilaterally   Psychiatric: cooperative      Medical Decision Making       Time spent in care of patient is 42 minutes and I reviewed labs and discussed plan of care in detail with patient and nursing staff      Data     I have personally reviewed the following data over the past 24 hrs:    14.3 (H)  \   13.3   / 301     139 100 16.7 /  111 (H)   4.4 26 0.61 \       Imaging results reviewed over the past 24 hrs:   Recent Results (from the past 24 hours)   XR Lumbar Spine Port 1 View    Narrative    EXAM: XR LUMBAR SPINE PORT 1 VIEW  LOCATION: [facility]  DATE: 7/21/2025    INDICATION: Redo L5-S1.  COMPARISON: None.      Impression    IMPRESSION:   Single intraoperative crosstable portable lateral view of the lumbar spine is submitted. There are 2 surgical probes projecting over the spine. Based on 5 lumbar type vertebrae, the cephalad probe is at the level of the L4-L5 disc and the caudad probe is   at the level of the L5-S1 disc.

## 2025-07-21 NOTE — CONSULTS
"[facility]    Neurosurgery Consultation     Date of Admission:  7/20/2025  Date of Consult (When I saw the patient): 07/20/25    Assessment & Plan   Kacy Herrera is a 55 year old female who was admitted on 7/20/2025. I was asked to see the patient for \"Recent surgery and now has worsening back pain and MRI findings.\"    Active Problems:    Acute bilateral low back pain with right-sided sciatica    S/p lumbar microdiscectomy    Assessment: large, recurrent right paracentral L5-S1 disc extrusion with cephalad migration of disc material with severe right foraminal and subarticular recess stenosis and moderate to severe central canal stenosis    Plan:   -To OR tomorrow for redo L5-S1 bilateral laminectomy and microdiscectomy with Dr. Atkinson, case request and pre op orders in place. Timing to be determined tomorrow. Per OR, unable to get a first start.   -NPO at midnight  -Pain management per primary service, appreciate assistance  -Activity as tolerated    Aliya Romo, MARIA DE JESUS  [facility]  [address] [place] [address]  Tel [phone]  Fax [phone]  Securely message with [employer] (more info)  Text page via [facility] Paging/Directory     Code Status    Full Code    Reason for Consult   Reason for consult: I was asked by Dr. Marcio Cornejo to evaluate this patient for \"Recent surgery and now has worsening back pain and MRI findings.\".    Primary Care Physician   Radha Ewing    Chief Complaint   Back and leg pain s/p lumbar microdiscectomy 7/3    History is obtained from the patient and EMR.     History of Present Illness   Kacy Herrera is a 55 year old female who presents with a history of right L5-S1 microdiscectomy with Dr. Atkinson on 7/3/2025. Reported ongoing low back and right leg pain after surgery with acute worsening of pain last week. No reported injury at the onset of worsening. Underwent an outpatient MRI and was instructed to " "present to  ED for admission and surgery. Today she was seen lying in bed. She reports incisional back pain as well as radicular pain from the back to the buttock and posterior thigh to the anterior shin and calf to the top of the foot. She reports the pain is \"burning.\" She denies any paresthesias or overt weakness, however she states she needs assistance to put on her underwear etc at times due to her symptoms. She denies any urinary/bowel incontinence. No saddle numbness.     Past Medical History   I have reviewed this patient's medical history and updated it with pertinent information if needed.   Past Medical History:   Diagnosis Date    Arthritis     knee left    Cholelithiasis     Depressive disorder     Left knee pain     Lumbar back pain with radiculopathy affecting right lower extremity     Other chronic pain     left knee    Overweight     Tobacco dependence     Vasomotor symptoms due to menopause        Past Surgical History   I have reviewed this patient's surgical history and updated it with pertinent information if needed.  Past Surgical History:   Procedure Laterality Date    ARTHROPLASTY REVISION KNEE Left 2022    Procedure: Left revision left total knee arthroplasty using a Ashwini LCCK knee system;  Surgeon: Bradly Treviño MD;  Location: [facility] OR    ARTHROSCOPY KNEE WITH MENISCAL REPAIR Left      SECTION      FOOT SURGERY      JOINT REPLACEMENT Left 2016    uni compartmenta    JOINT REPLACEMENT (aka KNEE) NOS Left     LAMINECTOMY LUMBAR ONE LEVEL Right 7/3/2025    Procedure: Right sided Lumbar 5-Sacral 1 hemilaminectomy, medial facetectomy and microdiscectomy;  Surgeon: Ryan Atkinson MD;  Location: [facility] OR    LAPAROSCOPIC CHOLECYSTECTOMY N/A 2020    Procedure: CHOLECYSTECTOMY, LAPAROSCOPIC with cholangiograms;  Surgeon: Shirley Purcell MD;  Location: [facility] OR    LUMBAR DISCECTOMY      ZZC PLASTY KNEE,MED OR LAT COMPARTMT Left 2017    Procedure: " CONVERT LEFT UNICOMPARTMENTAL ;  Surgeon: Josh Moseley MD;  Location: [facility];  Service: Orthopedics    [facility] TOTAL KNEE ARTHROPLASTY Left 2017    Procedure: TO TOTAL KNEE ARTHROPLASTY;  Surgeon: Josh Moseley MD;  Location: [facility];  Service: Orthopedics       Prior to Admission Medications   Prior to Admission Medications   Prescriptions Last Dose Informant Patient Reported? Taking?   HYDROmorphone (DILAUDID) 2 MG tablet 2025 Morning  No Yes   Sig: Take 1-2 tablets (2-4 mg) by mouth every 4 hours as needed for moderate to severe pain.   acetaminophen (TYLENOL) 325 MG tablet 2025 Evening  No Yes   Sig: Take 2 tablets (650 mg) by mouth every 4 hours as needed for other (mild pain)   ibuprofen (ADVIL/MOTRIN) 600 MG tablet 2025 Morning  Yes Yes   Sig: Take 600 mg by mouth every 8 hours as needed for moderate pain.   levonorgestrel (MIRENA) 52 MG (20 mcg/day) IUD Past Month  Yes Yes   Si each by Intrauterine route.   methocarbamol (ROBAXIN) 750 MG tablet 2025 Morning  No Yes   Sig: Take 1 tablet (750 mg) by mouth every 6 hours as needed for muscle spasms.   ondansetron (ZOFRAN) 4 MG tablet 2025 Morning  No Yes   Sig: Take 1 tablet (4 mg) by mouth every 6 hours as needed for nausea.   polyethylene glycol (MIRALAX) 17 g packet 2025 Morning  Yes Yes   Sig: Take 1 packet by mouth daily.   predniSONE (DELTASONE) 20 MG tablet 2025 Morning  Yes Yes   Sig: Take 20 mg by mouth 2 times daily.   venlafaxine (EFFEXOR-XR) 150 MG 24 hr capsule 2025 Evening  Yes Yes   Sig: Take 150 mg by mouth every evening.      Facility-Administered Medications: None     Allergies   Allergies   Allergen Reactions    Oxycodone Itching    Zithromax [Azithromycin] Hives       Social History   I have reviewed this patient's social history and updated it with pertinent information if needed. Kacy HESS Javier  reports that she has been smoking cigarettes. She has a 7.5 pack-year  smoking history. She has never used smokeless tobacco. She reports current alcohol use. She reports that she does not use drugs.    Family History   I have reviewed this patient's family history and updated it with pertinent information if needed.   No family history on file.    Review of Systems   10 point ROS negative except noted above.     Physical Exam   Temp: 98  F (36.7  C)   BP: (!) 159/93 Pulse: 83   Resp: 16 SpO2: 95 % O2 Device: None (Room air)    Vital Signs with Ranges  Temp:  [98  F (36.7  C)] 98  F (36.7  C)  Pulse:  [] 83  Resp:  [16] 16  BP: (123-166)/(75-93) 159/93  SpO2:  [93 %-97 %] 95 %  178 lbs 0 oz     , Blood pressure (!) 159/93, pulse 83, temperature 98  F (36.7  C), resp. rate 16, weight 80.7 kg (178 lb), SpO2 95%.  178 lbs 0 oz    NEUROLOGICAL EXAMINATION:   Mental status:  Alert and Oriented x 3, speech is fluent.  Motor:  Intact in BLE except 5-/5 in dorsiflexion bilaterally  Sensation:  intact    Data     CBC RESULTS:   Recent Labs   Lab Test 07/20/25  1648   WBC 13.2*   RBC 4.31   HGB 13.2   HCT 39.8   MCV 92   MCH 30.6   MCHC 33.2   RDW 14.5        Basic Metabolic Panel:  Lab Results   Component Value Date     07/20/2025     04/24/2020      Lab Results   Component Value Date    POTASSIUM 4.8 07/20/2025    POTASSIUM 4.0 04/24/2020     Lab Results   Component Value Date    CHLORIDE 97 07/20/2025    CHLORIDE 106 04/24/2020     Lab Results   Component Value Date    ZENA 9.4 07/20/2025    ZENA 8.2 04/24/2020     Lab Results   Component Value Date    CO2 29 07/20/2025    CO2 29 04/24/2020     Lab Results   Component Value Date    BUN 16.2 07/20/2025    BUN 11 04/24/2020     Lab Results   Component Value Date    CR 0.70 07/20/2025    CR 0.62 04/24/2020     Lab Results   Component Value Date     07/20/2025     07/03/2025     01/05/2022     04/24/2020     INR:  Lab Results   Component Value Date    INR 0.96 07/03/2025    INR 0.95 01/21/2011     INR 0.91 01/14/2008

## 2025-07-21 NOTE — PROGRESS NOTES
Chart update  Went to see the patient but she is in the OR and will see her after she comes back from the OR

## 2025-07-21 NOTE — OR NURSING
Pt stable. SBP has been high- since admission. Pt states in the clinic she had high blood pressures. Treated in pacu with labetelol 10 mg. Pain under control. Per Dr. Gretel bryson to transfer pt to floor.

## 2025-07-21 NOTE — ANESTHESIA CARE TRANSFER NOTE
Patient: Kacy Herrera    Procedure: Procedure(s):  REDO LUMBAR 5 TO SACRAL 1 RIGHT MICRODISCECTOMY       Diagnosis: Recurrent herniation of lumbar disc [M51.26]  Diagnosis Additional Information: No value filed.    Anesthesia Type:   General     Note:    Oropharynx: oropharynx clear of all foreign objects and spontaneously breathing  Level of Consciousness: drowsy  Oxygen Supplementation: face mask    Independent Airway: airway patency satisfactory and stable  Dentition: dentition unchanged  Vital Signs Stable: post-procedure vital signs reviewed and stable  Report to RN Given: handoff report given  Patient transferred to: PACU    Handoff Report: Identifed the Patient, Identified the Reponsible Provider, Reviewed the pertinent medical history, Discussed the surgical course, Reviewed Intra-OP anesthesia mangement and issues during anesthesia, Set expectations for post-procedure period and Allowed opportunity for questions and acknowledgement of understanding      Vitals:  Vitals Value Taken Time   /87 07/21/25 10:54   Temp     Pulse 103 07/21/25 10:56   Resp 18 07/21/25 10:56   SpO2 99 % 07/21/25 10:56   Vitals shown include unfiled device data.    Electronically Signed By: FINA Oliver CRNA  July 21, 2025  10:58 AM

## 2025-07-21 NOTE — PLAN OF CARE
Goal Outcome Evaluation:      Cognitive Concerns/ Orientation : A&O x4   BEHAVIOR & AGGRESSION TOOL COLOR: Green  CIWA SCORE: NA   ABNL VS/O2: VSS on RA  MOBILITY: Up with standby assistance with gait belt and walker  PAIN MANAGMENT: Pain managed with IV and Oral Dilaudid, Robaxin and scheduled Tylenol  DIET: NPO for surgery  BOWEL/BLADDER: Continent of bladder, No BM  DRAIN/DEVICES: PIV/saline lock  TELEMETRY RHYTHM: NA  SKIN: Has healed previous back incision, scattered bruises  TESTS/PROCEDURES: Redo Laminectomy 7/21/25  D/C DATE: TBD  Has tingling of the R foot

## 2025-07-21 NOTE — ANESTHESIA POSTPROCEDURE EVALUATION
Patient: Kacy Herrera    Procedure: Procedure(s):  REDO LUMBAR 5 TO SACRAL 1 RIGHT MICRODISCECTOMY       Anesthesia Type:  General    Note:     Postop Pain Control: Uneventful            Sign Out: Well controlled pain   PONV: No   Neuro/Psych: Uneventful            Sign Out: Acceptable/Baseline neuro status   Airway/Respiratory: Uneventful            Sign Out: Acceptable/Baseline resp. status   CV/Hemodynamics: Uneventful            Sign Out: Acceptable CV status   Other NRE: NONE   DID A NON-ROUTINE EVENT OCCUR?            Last vitals:  Vitals Value Taken Time   /112 07/21/25 11:25   Temp 36.3  C (97.4  F) 07/21/25 11:15   Pulse 90 07/21/25 11:28   Resp 13 07/21/25 11:28   SpO2 96 % 07/21/25 11:28   Vitals shown include unfiled device data.    Electronically Signed By: Dallin Majano MD  July 21, 2025  11:30 AM

## 2025-07-21 NOTE — PROGRESS NOTES
"Neurosurgery note:     I saw patient and  in preoperative area. She reports subjective acute decline since 7/16/25 with respect to right lower extremity radficulopathy. I reviewed imaging which shows a recurrent sided right sided disc herniation at L5-S1 that is rostrally migrating. She has associated weakness in her right dorsiflexor on my examination that is 3/5. Her left dorsiflexor is 4/5. I reviewed my surgical plan for trying to approach the disc from a right sided only approach and if needed, utilize a bilateral approach. I reviewed the risks with the patient including: failure to improve preoperative symptoms, need for additional surgery, need for future spinal instrumentation, bleeding, infection, nerve root injury. Patient stated understanding and is amenable to proceed with surgery.     Ryan \"Florin\" MD [place]    of Neurosurgery, [facility]    "

## 2025-07-21 NOTE — ANESTHESIA PREPROCEDURE EVALUATION
Anesthesia Pre-Procedure Evaluation    Patient: Kacy Herrera   MRN: [id number] : 1970          Procedure : Procedure(s):  REDO LUMBAR 5 TO SACRAL 1 BILATERAL LAMINECTOMY AND MICRODISCECTOMY         Past Medical History:   Diagnosis Date    Arthritis     knee left    Cholelithiasis     Depressive disorder     Left knee pain     Lumbar back pain with radiculopathy affecting right lower extremity     Other chronic pain     left knee    Overweight     Tobacco dependence     Vasomotor symptoms due to menopause       Past Surgical History:   Procedure Laterality Date    ARTHROPLASTY REVISION KNEE Left 2022    Procedure: Left revision left total knee arthroplasty using a Ashwini LCCK knee system;  Surgeon: Bradly Treviño MD;  Location: [facility] OR    ARTHROSCOPY KNEE WITH MENISCAL REPAIR Left      SECTION      FOOT SURGERY      JOINT REPLACEMENT Left 2016    uni compartmenta    JOINT REPLACEMENT (aka KNEE) NOS Left     LAMINECTOMY LUMBAR ONE LEVEL Right 7/3/2025    Procedure: Right sided Lumbar 5-Sacral 1 hemilaminectomy, medial facetectomy and microdiscectomy;  Surgeon: Ryan Atkinson MD;  Location: [facility] OR    LAPAROSCOPIC CHOLECYSTECTOMY N/A 2020    Procedure: CHOLECYSTECTOMY, LAPAROSCOPIC with cholangiograms;  Surgeon: Shirley Purcell MD;  Location: [facility] OR    LUMBAR DISCECTOMY      [facility] PLASTY KNEE,MED OR LAT COMPARTMT Left 2017    Procedure: CONVERT LEFT UNICOMPARTMENTAL ;  Surgeon: Josh Moseley MD;  Location: [facility] OR;  Service: Orthopedics    [facility] TOTAL KNEE ARTHROPLASTY Left 2017    Procedure: TO TOTAL KNEE ARTHROPLASTY;  Surgeon: Josh Moseley MD;  Location: [facility];  Service: Orthopedics      Allergies   Allergen Reactions    Oxycodone Itching    Zithromax [Azithromycin] Hives      Social History     Tobacco Use    Smoking status: Every Day     Current packs/day: 0.50     Average packs/day: 0.5 packs/day for 15.0 years (7.5 ttl  pk-yrs)     Types: Cigarettes    Smokeless tobacco: Never   Substance Use Topics    Alcohol use: Yes     Comment: daily wine-3 glasses      Wt Readings from Last 1 Encounters:   07/20/25 80.7 kg (178 lb)        Anesthesia Evaluation   Pt has had prior anesthetic. Type: General.    No history of anesthetic complications       ROS/MED HX  ENT/Pulmonary:     (+)                tobacco use, Current use,                       Neurologic:     (+)    peripheral neuropathy, - Lumbar radiculopathy.                           Cardiovascular:     (+) Dyslipidemia hypertension- -   -  - -                                      METS/Exercise Tolerance:     Hematologic:       Musculoskeletal: Comment: S/p lumbar lami  (+)  arthritis,             GI/Hepatic:       Renal/Genitourinary:       Endo:     (+)               Obesity (Class 1),       Psychiatric/Substance Use:     (+) psychiatric history anxiety and depression       Infectious Disease:       Malignancy:       Other:      (+)  , H/O Chronic Pain,           Physical Exam  Airway  Mallampati: II  TM distance: >3 FB    Cardiovascular - normal exam   Dental     Pulmonary       Neurological - normal exam  She appears awake, alert and oriented x3.    Other Findings       OUTSIDE LABS:  CBC:   Lab Results   Component Value Date    WBC 13.2 (H) 07/20/2025    WBC 9.2 07/03/2025    HGB 13.2 07/20/2025    HGB 13.2 07/03/2025    HCT 39.8 07/20/2025    HCT 40.3 07/03/2025     07/20/2025     07/03/2025     BMP:   Lab Results   Component Value Date     07/20/2025     07/03/2025    POTASSIUM 4.8 07/20/2025    POTASSIUM 4.1 07/03/2025    CHLORIDE 97 (L) 07/20/2025    CHLORIDE 103 07/03/2025    CO2 29 07/20/2025    CO2 25 07/03/2025    BUN 16.2 07/20/2025    BUN 15.1 07/03/2025    CR 0.70 07/20/2025    CR 0.65 07/03/2025     (H) 07/20/2025     (H) 07/03/2025     COAGS:   Lab Results   Component Value Date    PTT 26 07/03/2025    INR 0.96 07/03/2025      POC:   Lab Results   Component Value Date    HCG Negative 04/24/2020     HEPATIC:   Lab Results   Component Value Date    ALBUMIN 3.2 (L) 04/24/2020    PROTTOTAL 6.7 (L) 04/24/2020     (H) 04/24/2020     (H) 04/24/2020    ALKPHOS 297 (H) 04/24/2020    BILITOTAL 0.6 04/24/2020     OTHER:   Lab Results   Component Value Date    ZENA 9.4 07/20/2025    LIPASE 37 (L) 04/22/2020       Anesthesia Plan    ASA Status:  3      NPO Status: NPO Appropriate   Anesthesia Type: General.  Airway: oral.  Induction: intravenous.   Techniques and Equipment:       - Monitoring Plan: standard ASA monitoring     Consents            Postoperative Care    Pain management: multimodal analgesia.     Comments:                   Dallin Majano MD    I have reviewed the pertinent notes and labs in the chart from the past 30 days and (re)examined the patient.  Any updates or changes from those notes are reflected in this note.    Clinically Significant Risk Factors Present on Admission          # Hypochloremia: Lowest Cl = 97 mmol/L in last 2 days, will monitor as appropriate

## 2025-07-22 VITALS
DIASTOLIC BLOOD PRESSURE: 88 MMHG | WEIGHT: 178 LBS | BODY MASS INDEX: 32.56 KG/M2 | TEMPERATURE: 98.6 F | OXYGEN SATURATION: 96 % | HEART RATE: 71 BPM | SYSTOLIC BLOOD PRESSURE: 167 MMHG | RESPIRATION RATE: 16 BRPM

## 2025-07-22 LAB
ERYTHROCYTE [DISTWIDTH] IN BLOOD BY AUTOMATED COUNT: 14.3 % (ref 10–15)
GLUCOSE SERPL-MCNC: 100 MG/DL (ref 70–99)
HCT VFR BLD AUTO: 37.6 % (ref 35–47)
HGB BLD-MCNC: 12.3 G/DL (ref 11.7–15.7)
MCH RBC QN AUTO: 30.2 PG (ref 26.5–33)
MCHC RBC AUTO-ENTMCNC: 32.7 G/DL (ref 31.5–36.5)
MCV RBC AUTO: 92 FL (ref 78–100)
PLATELET # BLD AUTO: 287 10E3/UL (ref 150–450)
RBC # BLD AUTO: 4.07 10E6/UL (ref 3.8–5.2)
WBC # BLD AUTO: 15.7 10E3/UL (ref 4–11)

## 2025-07-22 PROCEDURE — 97165 OT EVAL LOW COMPLEX 30 MIN: CPT | Mod: GO

## 2025-07-22 PROCEDURE — 85027 COMPLETE CBC AUTOMATED: CPT | Performed by: INTERNAL MEDICINE

## 2025-07-22 PROCEDURE — 250N000011 HC RX IP 250 OP 636: Performed by: NURSE PRACTITIONER

## 2025-07-22 PROCEDURE — 99232 SBSQ HOSP IP/OBS MODERATE 35: CPT

## 2025-07-22 PROCEDURE — 250N000011 HC RX IP 250 OP 636

## 2025-07-22 PROCEDURE — 82947 ASSAY GLUCOSE BLOOD QUANT: CPT | Performed by: HOSPITALIST

## 2025-07-22 PROCEDURE — 97530 THERAPEUTIC ACTIVITIES: CPT | Mod: GO

## 2025-07-22 PROCEDURE — 97535 SELF CARE MNGMENT TRAINING: CPT | Mod: GO

## 2025-07-22 PROCEDURE — 250N000013 HC RX MED GY IP 250 OP 250 PS 637: Performed by: NURSE PRACTITIONER

## 2025-07-22 PROCEDURE — 36415 COLL VENOUS BLD VENIPUNCTURE: CPT | Performed by: HOSPITALIST

## 2025-07-22 PROCEDURE — 250N000013 HC RX MED GY IP 250 OP 250 PS 637

## 2025-07-22 PROCEDURE — 99239 HOSP IP/OBS DSCHRG MGMT >30: CPT | Performed by: INTERNAL MEDICINE

## 2025-07-22 RX ORDER — PREGABALIN 25 MG/1
25 CAPSULE ORAL 2 TIMES DAILY
Qty: 60 CAPSULE | Refills: 0 | Status: SHIPPED | OUTPATIENT
Start: 2025-07-22 | End: 2025-08-21

## 2025-07-22 RX ORDER — ACETAMINOPHEN 325 MG/1
975 TABLET ORAL EVERY 8 HOURS
Qty: 90 TABLET | Refills: 0 | Status: SHIPPED | OUTPATIENT
Start: 2025-07-22

## 2025-07-22 RX ORDER — METHOCARBAMOL 500 MG/1
500 TABLET, FILM COATED ORAL EVERY 6 HOURS
Qty: 40 TABLET | Refills: 0 | Status: SHIPPED | OUTPATIENT
Start: 2025-07-22

## 2025-07-22 RX ORDER — HYDROMORPHONE HYDROCHLORIDE 2 MG/1
2-4 TABLET ORAL EVERY 4 HOURS PRN
Qty: 40 TABLET | Refills: 0 | Status: SHIPPED | OUTPATIENT
Start: 2025-07-22

## 2025-07-22 RX ORDER — AMOXICILLIN 250 MG
1 CAPSULE ORAL 2 TIMES DAILY
Qty: 60 TABLET | Refills: 0 | Status: SHIPPED | OUTPATIENT
Start: 2025-07-22

## 2025-07-22 RX ORDER — PREGABALIN 25 MG/1
25 CAPSULE ORAL 2 TIMES DAILY
Status: DISCONTINUED | OUTPATIENT
Start: 2025-07-22 | End: 2025-07-22 | Stop reason: HOSPADM

## 2025-07-22 RX ADMIN — CEFAZOLIN SODIUM 2 G: 2 SOLUTION INTRAVENOUS at 02:28

## 2025-07-22 RX ADMIN — PREGABALIN 25 MG: 25 CAPSULE ORAL at 10:21

## 2025-07-22 RX ADMIN — HYDROMORPHONE HYDROCHLORIDE 4 MG: 2 TABLET ORAL at 05:51

## 2025-07-22 RX ADMIN — HYDROMORPHONE HYDROCHLORIDE 0.2 MG: 0.2 INJECTION, SOLUTION INTRAMUSCULAR; INTRAVENOUS; SUBCUTANEOUS at 03:44

## 2025-07-22 RX ADMIN — CEFAZOLIN SODIUM 2 G: 2 SOLUTION INTRAVENOUS at 09:12

## 2025-07-22 RX ADMIN — METHOCARBAMOL 500 MG: 500 TABLET ORAL at 09:13

## 2025-07-22 RX ADMIN — HYDROMORPHONE HYDROCHLORIDE 4 MG: 2 TABLET ORAL at 10:52

## 2025-07-22 RX ADMIN — POLYETHYLENE GLYCOL 3350 17 G: 17 POWDER, FOR SOLUTION ORAL at 09:13

## 2025-07-22 RX ADMIN — HYDROMORPHONE HYDROCHLORIDE 4 MG: 2 TABLET ORAL at 00:44

## 2025-07-22 RX ADMIN — METHOCARBAMOL 500 MG: 500 TABLET ORAL at 02:28

## 2025-07-22 RX ADMIN — ACETAMINOPHEN 975 MG: 325 TABLET ORAL at 05:40

## 2025-07-22 ASSESSMENT — ACTIVITIES OF DAILY LIVING (ADL)
ADLS_ACUITY_SCORE: 59
PREVIOUS_RESPONSIBILITIES: MEAL PREP;LAUNDRY;HOUSEKEEPING;MEDICATION MANAGEMENT;FINANCES
ADLS_ACUITY_SCORE: 59

## 2025-07-22 NOTE — PROGRESS NOTES
[facility] [place]  Neurosurgery Progress Note  Date of Admission: 7/20/2025   Date of Service: 07/22/2025    Procedure(s):  REDO LUMBAR 5 TO SACRAL 1 RIGHT MICRODISCECTOMY   1 Day Post-Op    Assessment and Plan:  Kacy Herrera is a 55-year-old female with history of right L5-S1 microdiscectomy with Dr. Calderon on 7/3/2025 who presented with recurring low back and right leg pain on 7/20.  Found to have a large recurrent right paracentral L5-S1 disc extrusion.  Went to OR with Dr. Atkinson for the above-mentioned procedure on 7/21.     - Activity: Up as tolerated. no excess bending at waist, twisting at waist, or lifting > 10 pounds  - Imaging: None indicated  - Incision: closed with absorbable Monocryl. Remove superficial dressing on 7/24/25. Keep open to air thereafter. Allow steri-strips to fall off. Ok to shower on 7/24/25.   - Abx: Ancef x 3 doses  - Trend leukocytosis   - Neuro checks: As ordered  - Okay for regular diet   - Incentive spirometer   - Nursing to remove BASSAM drain today   - PT/OT  - DVT ppx: SCDs  - Appreciate assistance from other teams with medical management.   - Dispo: Pending adequate pain control and therapy recommendations  - Okay to discharge from NSGY standpoint after drain is removed. Discharge instructions added.   - Follow up: added a one week follow up per patient's request. 2-week incision check, 6 weeks, 12 weeks    Plans discussed with Dr. Atkinson who was in agreement with plans.     Tram Ervin PA-C   [facility] Neurosurgery  Clinic phone number: [phone]  Securely message or page via Epic Secure Chat, [employer], or [employer]     - - - - -     Subjective: Pt endorses tolerable back pain. Denies worsening numbness, tingling,  or weakness in lower extremities. Asking if she can go home.     Objective:   Imaging:   Results for orders placed or performed during the hospital encounter of 07/20/25   XR Lumbar Spine Port 1 View    Impression    IMPRESSION:   Single  intraoperative crosstable portable lateral view of the lumbar spine is submitted. There are 2 surgical probes projecting over the spine. Based on 5 lumbar type vertebrae, the cephalad probe is at the level of the L4-L5 disc and the caudad probe is   at the level of the L5-S1 disc.     Labs:   - Hgb: 12.3  - Plts: 287  - WBC: 15.7 (14.3)  - Na: 139    Vitals:    07/20/25 1624   Weight: 80.7 kg (178 lb)     Vital Signs with Ranges  Temp:  [97.3  F (36.3  C)-98.9  F (37.2  C)] 98.6  F (37  C)  Pulse:  [] 71  Resp:  [12-18] 16  BP: (105-202)/() 167/88  SpO2:  [93 %-100 %] 96 %  I/O last 3 completed shifts:  In: 1240 [P.O.:240; I.V.:1000]  Out: 1730 [Urine:1650; Drains:60; Blood:20]  Drain output: 60 ml / 24 hours (15 ml / night shift)    Physical Exam:   General: NAD, laying in bed. Able to move independently in bed   HENNT: atraumatic, normocephalic. EOMi b/l. PERRLA  Mental status:  AOx4, speech is fluent, following commands  Cranial nerves:  II-XII grossly intact.   Motor:  Moves all extremities independently.   Strength:     Hip flexion                Right: 5/5  Left:  5/5  Knee extension         Right:  5/5  Left:  5/5  Knee flexion              Right:  5/5  Left:  5/5  Gastroc Soleus (PF) Right:  5/5  Left:  5/5  Tibialis Ant (DF)        Right:  5/5  Left:  5/5  Sensation: grossly Intact to light touch in lower extremities b/l  BASSAM drain with serosanguineous output   Incision: incision covered with clean dressing without strikethrough

## 2025-07-22 NOTE — PLAN OF CARE
Goal Outcome Evaluation:      Patient ambulation status: SBA gb&w  Patient able to stand for X-ray: Yes  Standing/upright x-ray complete: No  Patient using oral analgesics: no, IV dilaudid  Voiding spontaneously: yes  Drains discontinued: no, BASSAM drain  Incision clean and dry:yes  Bowel status: passing gas    Pain managed with PRN oral and IV dilaudid.

## 2025-07-22 NOTE — DISCHARGE SUMMARY
[facility]    Discharge Summary  Hospitalist    Date of Admission:  7/20/2025  Date of Discharge:  7/22/2025  Discharging Provider: Corinna Saldaña MD    Discharge Diagnoses   Acute bilateral low back pain with right-sided sciatica    History of Present Illness   Please review admission history and physical.    Hospital Course   Kacy Herrera was admitted on 7/20/2025.  The following problems were addressed during her hospitalization:    Active Problems:    Intractable pain    Acute bilateral low back pain with right-sided sciatica  Kacy Herrera is a 55 year old female who medical history which includes lumbar back pain with radiculopathy, chronic pain of the left knee, depression, prior history of left L5-S1 micro discectomy and on 7/3 she underwent right sided L5-S1 hemilaminectomy, medial facetectomy and microdiscectomy  with Dr. Atkinson and was discharged home on Medrol Dosepak and pain medication and after surgical intervention she has been having increased pain and stopped taking Dilaudid which made her groggy and wa and  taking Tylenol and ibuprofen and started taking tramadol which did not help and underwent MRI s as outpatient on 7/19 which showed large recurrent right paracentral L5-S1 text disc fusion with cephalad migration of the disc material and severe right foraminal and subarticular recess stenosis and moderate to severe canal stenosis  Came to the ED with 10/ 10 pain and admitted on 7/20/2025        Intractable back pain s/p redo L5-S1 right microdiscectomy on 7/21/25  Status post L5-S1 hemilaminectomy, medial facetectomy and microdiscectomy  with Dr. Atkinson on 7/3/25   History of left L5-S1 microdiscectomy     -Of note patient underwent above operative intervention and after operative intervention she has been having difficulty with pain and nausea and has been trying different medications with less relief  - MRI as outpatient on 7/19 showed large recurrent right  paracentral L5-S1 text disc fusion with cephalad migration of the disc material and severe right foraminal and subarticular recess stenosis and moderate to severe canal stenosis, patient was consulted by neurosurgery in the ED, underwent redo of lumbar 5 to sacral 1 right microdiscectomy, seen by PT/OT and cleared by neurosurgery for discharge.  Follow-up arranged with him outpatient.  Drain was removed prior to discharge.  DVT prophylaxis as per neurosurgery team.       Mild leukocytosis likely due to stress response and prednisone use  - No evidence of any infection     Depression  -Continue with PTA venlafaxine     Family communication   -d/w  and other family members after she gave permission and questions answered        Corinna Saldaña MD    Significant Results and Procedures       Pending Results     Unresulted Labs Ordered in the Past 30 Days of this Admission       No orders found from 6/20/2025 to 7/21/2025.            Code Status   Full Code       Primary Care Physician   Radha Ewing    Physical Exam   Temp: 98.6  F (37  C) Temp src: Oral BP: (!) 167/88 Pulse: 71   Resp: 16 SpO2: 96 % O2 Device: None (Room air) Oxygen Delivery: 2 LPM  Vitals:    07/20/25 1624   Weight: 80.7 kg (178 lb)     Vital Signs with Ranges  Temp:  [98.2  F (36.8  C)-98.9  F (37.2  C)] 98.6  F (37  C)  Pulse:  [71-84] 71  Resp:  [14-16] 16  BP: (145-167)/(70-88) 167/88  SpO2:  [94 %-97 %] 96 %  I/O last 3 completed shifts:  In: 1240 [P.O.:240; I.V.:1000]  Out: 1730 [Urine:1650; Drains:60; Blood:20]    The patient was examined on the day of discharge.    Discharge Disposition   Discharged to home  Condition at discharge: Stable    Consultations This Hospital Stay   NEUROSURGERY IP CONSULT  PAIN MANAGEMENT ADULT IP CONSULT  OCCUPATIONAL THERAPY ADULT IP CONSULT  PHYSICAL THERAPY ADULT IP CONSULT    Time Spent on this Encounter   ICorinna MD, personally saw the patient today and spent greater than 30 minutes  discharging this patient.    Discharge Orders      Reason for your hospital stay    Redo L5-S1 microdiscectomy on right     Activity    Please limit your lifting to no more that ten pounds and avoid excessive bending, twisting and turning at the lumbar spine. You should also avoid excessive jostling and jarring activities.     Follow Up    Your Neurosurgical follow-up appointments have been scheduled. You may call [phone] to make, confirm or change your appointment dates and/or times.     Wound care and dressings    closed with absorbable Monocryl. Remove superficial dressing on 7/24/25. Keep open to air thereafter. Allow steri-strips to fall off. Ok to shower on 7/24/25.     Discharge Instructions    Call [facility] clinic at [phone] with questions or concerns or send message on [employer].     Diet    Follow this diet upon discharge: Regular     Hospital Follow-up with Existing Primary Care Provider (PCP)          Discharge Medications   Current Discharge Medication List        START taking these medications    Details   pregabalin (LYRICA) 25 MG capsule Take 1 capsule (25 mg) by mouth 2 times daily.  Qty: 60 capsule, Refills: 0    Associated Diagnoses: S/P lumbar laminectomy      senna-docusate (SENOKOT-S/PERICOLACE) 8.6-50 MG tablet Take 1 tablet by mouth 2 times daily.  Qty: 60 tablet, Refills: 0    Comments: Take while using dilaudid to avoid constipation.  Associated Diagnoses: S/P lumbar discectomy           CONTINUE these medications which have CHANGED    Details   acetaminophen (TYLENOL) 325 MG tablet Take 3 tablets (975 mg) by mouth every 8 hours.  Qty: 90 tablet, Refills: 0    Associated Diagnoses: S/P lumbar discectomy      HYDROmorphone (DILAUDID) 2 MG tablet Take 1-2 tablets (2-4 mg) by mouth every 4 hours as needed for moderate to severe pain.  Qty: 40 tablet, Refills: 0    Associated Diagnoses: S/P lumbar discectomy      methocarbamol (ROBAXIN) 500 MG tablet Take 1 tablet (500 mg) by  mouth every 6 hours.  Qty: 40 tablet, Refills: 0    Associated Diagnoses: S/P lumbar discectomy           CONTINUE these medications which have NOT CHANGED    Details   ibuprofen (ADVIL/MOTRIN) 600 MG tablet Take 600 mg by mouth every 8 hours as needed for moderate pain.      levonorgestrel (MIRENA) 52 MG (20 mcg/day) IUD 1 each by Intrauterine route.      ondansetron (ZOFRAN) 4 MG tablet Take 1 tablet (4 mg) by mouth every 6 hours as needed for nausea.  Qty: 12 tablet, Refills: 0    Associated Diagnoses: Lumbar radiculopathy      polyethylene glycol (MIRALAX) 17 g packet Take 1 packet by mouth daily.      predniSONE (DELTASONE) 20 MG tablet Take 20 mg by mouth 2 times daily.      venlafaxine (EFFEXOR-XR) 150 MG 24 hr capsule Take 150 mg by mouth every evening.           Allergies   Allergies   Allergen Reactions    Oxycodone Itching    Zithromax [Azithromycin] Hives     Data   Most Recent 3 CBC's:  Recent Labs   Lab Test 07/22/25  0904 07/21/25  0828 07/20/25  1648   WBC 15.7* 14.3* 13.2*   HGB 12.3 13.3 13.2   MCV 92 92 92    301 316      Most Recent 3 BMP's:  Recent Labs   Lab Test 07/22/25  0904 07/21/25  0828 07/20/25  1648 07/03/25  0607   NA  --  139 137 140   POTASSIUM  --  4.4 4.8 4.1   CHLORIDE  --  100 97* 103   CO2  --  26 29 25   BUN  --  16.7 16.2 15.1   CR  --  0.61 0.70 0.65   ANIONGAP  --  13 11 12   ZENA  --  9.5 9.4 8.9   * 111* 137* 110*     Most Recent 2 LFT's:  Recent Labs   Lab Test 04/24/20  0811 04/23/20  0549   * 465*   * 737*   ALKPHOS 297* 321*   BILITOTAL 0.6 0.9     Most Recent INR's and Anticoagulation Dosing History:  Anticoagulation Dose History          Latest Ref Rng & Units 1/14/2008 1/21/2011 7/3/2025   Recent Dosing and Labs   INR 0.85 - 1.15 0.91  0.95  0.96      Most Recent 3 Troponin's:No lab results found.  Most Recent Cholesterol Panel:No lab results found.  Most Recent 6 Bacteria Isolates From Any Culture (See EPIC Reports for Culture  Details):  Recent Labs   Lab Test 10/03/18  1604   CULT No anaerobes isolated  No growth     Most Recent TSH, T4 and A1c Labs:No lab results found.  Results for orders placed or performed during the hospital encounter of 07/20/25   XR Lumbar Spine Port 1 View    Narrative    EXAM: XR LUMBAR SPINE PORT 1 VIEW  LOCATION: [facility]  DATE: 7/21/2025    INDICATION: Redo L5-S1.  COMPARISON: None.      Impression    IMPRESSION:   Single intraoperative crosstable portable lateral view of the lumbar spine is submitted. There are 2 surgical probes projecting over the spine. Based on 5 lumbar type vertebrae, the cephalad probe is at the level of the L4-L5 disc and the caudad probe is   at the level of the L5-S1 disc.

## 2025-07-22 NOTE — PROGRESS NOTES
[facility] ACUTE INPATIENT PAIN SERVICE    [facility], [facility], [facility], [facility], [place]   PAIN PROGRESS NOTE        ASSESSMENT/ PLAN:  Pain is consistent with acute post operative pain status post redo L5-S1 right microdiscectomy with Dr. Atkinson on 7/21.      Multimodal Medication Therapy:    Adjuvants:   Acetaminophen 975mg q8h   Robaxin 500mg q6h   lidocaine patches x2   Add lyrica 25mg bid   Opioids:   Dilaudid 2-4mg q4h prn - first line.   IV dilaudid discontinued  Non-medication interventions- Ice, PT  Constipation Prophylaxis- scheduled miralax, prn senna   Follow up /Discharge Recommendations - We recommend prescribing the following at the time of discharge:  Per neurosurgery    In the last 24 hours, Kacy has received:  1 (0.2mg) iv dilaudid, 4 (4mg) po dilaudid         Subjective:  Kacy is seen sitting at the edge of her bed in no acute distress, multiple family members at the bedside. Currently endorsing 5/10 pain, reports this is a significant improvement compared to prior to surgery. Is experiencing right lower extremity radiculopathy she describes as burning. She has previously used gabapentin with no relief. She is open to trying lyrica today. Denies nausea, vomiting,diarrhea, chest pain, shortness of breath. Last bowel movement Sunday. She reports pain is tolerable and would like to go home today, neurosurgery updated.     Reviewed plan outlined above, patient and spouse verbalized understanding and are in agreement with the plan.      HPI:  Kacy Herrera is a 55 year old female who was admitted on 7/20/2025.  Pain team was asked by Dr. Marcio Cornejo to see the patient for intractable back pain. Admitted after presenting to the ED following outpatient MRI showing large recurrent right paracentral L5-S1 disc extrusion with cephalad migration of the disc material and severe right foraminal and subarticular recess stenosis and moderate to severe canal stenosis.  Neurosurgery consulted  and redo L5-S1 right microdiscectomy completed 7/21/25. History of lumbar back pain with radiculopathy, chronic pain of the left knee, prior L5-S1 micro discectomy on 7/3.    shows former gabapentin, various opioids. Describes pain as 7/10 to the lower back and improving compared to prior to admission.       No significant history of routine opioid use. Reports primarily taking tylenol and ibuprofen for pain management.      PDMP RESULTS:   -07/18/25 hydromorphone 2mg #20 for 4 days   -07/16/25 tramadol 50mg #20 for 7 days   -07/04/25 hydromorphone 2mg #40 for 4 days  -05/29/25 lorazepam 1mg #2 for 1 day    History   Drug Use No         Tobacco Use      Smoking status: Every Day        Packs/day: 0.50        Years: 0.5 packs/day for 15.0 years (7.5 ttl pk-yrs)        Types: Cigarettes      Smokeless tobacco: Never        Objective:  Vital signs in last 24 hours:  B/P: 145/70, T: 98.9, P: 77, R: 16   Blood pressure (!) 145/70, pulse 77, temperature 98.9  F (37.2  C), temperature source Oral, resp. rate 16, weight 80.7 kg (178 lb), SpO2 96%.        Review of Systems:   As per subjective, all others negative.    Physical Exam    General: in no apparent distress and non-toxic, sitting at edge of bed  HEENT: Head normocephalic atraumatic, oral mucosa moist. Sclerae anicteric  Resp: Respirations unlabored, on room air   Skin: No rashes or lesions to visualized skin   Extremities: No peripheral edema, able to move all four extremities spontaneously  Psych: Normal affect, pleasant  Neuro: Alert and oriented x3      Please see A&P for additional details of medical decision making.  NOTE(S)/MEDICAL RECORDS REVIEWED over the past 24 hours:   -hospitalist and neurosurgery interval history reviewed  Tests REVIEWED in the past 24 hours:  - CBC  SUPPLEMENTAL HISTORY, in addition to the patient's history, over the past 24 hours obtained from:   - Spouse or significant other  Medical complexity over the past 24 hours:  -  Parenteral (IV) CONTROLLED SUBSTANCES ordered  - Prescription DRUG MANAGEMENT performed         Starr HARDEN, CRYSTALP-BC  Acute Care Pain Management Program   Hours of pain coverage Mon-Fri [phone], afterhours please call the primary team  [facility] (REFUGIO, Shireen, [place], RH)   Page via Epic Messaging or Vocera text web console -Click for Witget

## 2025-07-22 NOTE — PROGRESS NOTES
07/22/25 0827   Appointment Info   Signing Clinician's Name / Credentials (OT) Ryan Valentine OTR/L   Living Environment   People in Home spouse   Current Living Arrangements house   Home Accessibility stairs to enter home   Number of Stairs, Main Entrance 2   Stair Railings, Main Entrance none   Transportation Anticipated family or friend will provide   Living Environment Comments Pt reports living in house w/ spouse w/ 2 ANNA. Walk in shower w/ small lip.   Self-Care   Usual Activity Tolerance good   Current Activity Tolerance moderate   Equipment Currently Used at Home none   Fall history within last six months no   Activity/Exercise/Self-Care Comment Pt reports being IND w/ all ADL's at baseline prior to admission.   Instrumental Activities of Daily Living (IADL)   Previous Responsibilities meal prep;laundry;housekeeping;medication management;finances   IADL Comments Pt repots being IND w/ all IADL's at baseline prior to admission.   General Information   Onset of Illness/Injury or Date of Surgery 07/20/25   Referring Physician Shayy De Anda NP   Patient/Family Therapy Goal Statement (OT) Return to home.   Additional Occupational Profile Info/Pertinent History of Current Problem Pt POD #1  Recurrent herniation of lumbar dis   Existing Precautions/Restrictions fall;spinal   Cognitive Status Examination   Orientation Status orientation to person, place and time   Visual Perception   Visual Impairment/Limitations WFL   Sensory   Sensory Quick Adds sensation intact   Pain Assessment   Patient Currently in Pain Yes, see Vital Sign flowsheet  (7/10)   Range of Motion Comprehensive   General Range of Motion no range of motion deficits identified   Strength Comprehensive (MMT)   General Manual Muscle Testing (MMT) Assessment no strength deficits identified   Bed Mobility   Bed Mobility supine-sit;sit-supine   Supine-Sit [place] (Bed Mobility) supervision   Sit-Supine [place] (Bed Mobility) supervision    Transfers   Transfers sit-stand transfer;toilet transfer   Sit-Stand Transfer   Sit-Stand [place] (Transfers) supervision   Assistive Device (Sit-Stand Transfers) walker, front-wheeled   Toilet Transfer   Type (Toilet Transfer) stand-sit;sit-stand   [place] Level (Toilet Transfer) supervision   Assistive Device (Toilet Transfer) walker, front-wheeled   Activities of Daily Living   BADL Assessment/Intervention lower body dressing;grooming;toileting   Lower Body Dressing Assessment/Training   Comment, (Lower Body Dressing) SBA   Grooming Assessment/Training   [place] Level (Grooming) supervision   Position (Grooming) sink side   Toileting   [place] Level (Toileting) supervision   Clinical Impression   Criteria for Skilled Therapeutic Interventions Met (OT) Yes, treatment indicated   OT Diagnosis Decreased activity tolerance w/ I/ADL's   Clinical Decision Making Complexity (OT) problem focused assessment/low complexity   Risk & Benefits of therapy have been explained evaluation/treatment results reviewed;care plan/treatment goals reviewed;risks/benefits reviewed;current/potential barriers reviewed;patient   OT Total Evaluation Time   OT Eval, Low Complexity Minutes (13519) 11   OT Goals   Therapy Frequency (OT) One time eval and treatment   OT Predicted Duration/Target Date for Goal Attainment 07/22/25   OT Goals Hygiene/Grooming;Lower Body Dressing;Toilet Transfer/Toileting;OT Goal 1   OT: Hygiene/Grooming supervision/stand-by assist;while standing;Goal Met   OT: Lower Body Dressing Supervision/stand-by assist;including set-up/clothing retrieval;Goal Met   OT: Toilet Transfer/Toileting Supervision/stand-by assist;toilet transfer;cleaning and garment management;Goal Met   OT: Goal 1 Patient will be able to navigate 6 stairs up/down w/ supervision to simulate home set up.  (Goal Met)   Self-Care/Home Management   Self-Care/Home Mgmt/ADL, Compensatory, Meal Prep Minutes (68778) 14   Symptoms Noted  During/After Treatment (Meal Preparation/Planning Training) fatigue;increased pain   Treatment Detail/Skilled Intervention Greeted pt supine in bed and they were agreeable for OT evaluation. Pt laying on opposite end of bed and reports that they have been having trouble getting comfortable in bed. Issued pt handout regarding safe completion of self care tasks following spine/back surgery and education provided. Pt reporting 7/10 pain while at rest. Educated pt on spinal precautions and safe logroll technique. Supervision sup > sit EOB. Supervision sit > stand edge of bed w/ use of FWW. Pt ambulated within room to/from bathroom and completed toilet transfer w/ supervision. Pt able to void and complete juan r cares while seated w/ supervision. Upon completion, pt stood and ambulated to sink to complete 1 g/h task. Pt w/ no concerns on bathroom ADL's Following ambulation, pt returned to EOB. Discussed LB dressing while maintaining precautions. Pt reports spouse will help w/ completing and declines to trial AE. Pt returned to supine at end of session and placed pillows for comfort. Pt remained supine w/ friend present in room.   Therapeutic Activities   Therapeutic Activity Minutes (08768) 10   Symptoms noted during/after treatment fatigue   Treatment Detail/Skilled Intervention Pt ambulated approx. 250' in hallway w/ Supervision and use of FWW. Pt able to safely navigate 6 stairs up/down to simulate home set up and reports spouse is around at all times. Pt able to complete additional ambulation at end of session w/ SBA and no AD. Pt w/ no noted LOB throughout.   OT Discharge Planning   OT Plan Discharge OT   OT Discharge Recommendation (DC Rec) home with assist   OT Rationale for DC Rec Pt is currently functioning slightly below baseline d/t increased pain. Pt lives in house w/ spouse and is IND at baseline. Anticipate w/ continued pain mgmt, pt will be safe to d/c home w/ assist from spouse w/ heavy IADL's.   OT Brief  overview of current status Goals of therapy will be to address safe mobility and make recs for d/c to next level of care. Pt and RN will continue to follow all falls risk precautions as documented by RN staff while hospitalized.   OT Total Distance Amb During Session (feet) 250   Total Session Time   Timed Code Treatment Minutes 24   Total Session Time (sum of timed and untimed services) 35

## 2025-07-22 NOTE — PLAN OF CARE
Occupational Therapy Discharge Summary    Reason for therapy discharge:    All goals and outcomes met, no further needs identified.    Progress towards therapy goal(s). See goals on Care Plan in [facility] electronic health record for goal details.  Goals met    Therapy recommendation(s):    No further therapy is recommended. Pt is currently functioning slightly below baseline d/t increased pain. Pt lives in house w/ spouse and is IND at baseline. Anticipate w/ continued pain mgmt, pt will be safe to d/c home w/ assist from spouse w/ heavy IADL's.

## 2025-07-22 NOTE — PLAN OF CARE
Goal Outcome Evaluation:      Plan of Care Reviewed With: patient, spouse           Patient alert and oriented x 4.  Denies shortness of breath, chest pain, nausea vomiting.  Patient standby assist gait belt walker.  BASSAM dressing clean dry intact bulb to suction.  Right lower extremities continue symptoms continue to improve.  Pain tolerable with oral pain medications. BASSAM removed in sterile fashion. Pt and SO able to repeat all discharge instructions for understanding. Pt left with all new medications, discharge instructions, and personal belongings. Pt inquired about taking marijuana gummies for pain. RN educated pt to avoid taking THC and to check with her primary care provider. Encouraged pt to continue with medications provided for pain, if they are not effective to reach out to neurosurgery with the number provided in packet.       Patient ambulation status: SBA  Patient able to stand for X-ray:NA  Standing/upright x-ray complete: NA  Patient using oral analgesics:yes  Voiding spontaneously:yes  Drains discontinued:yes  Incision clean and dry:yes  Bowel status:passing gas

## 2025-07-24 ENCOUNTER — PATIENT OUTREACH (OUTPATIENT)
Dept: CARE COORDINATION | Facility: CLINIC | Age: 55
End: 2025-07-24
Payer: COMMERCIAL

## 2025-07-24 NOTE — PROGRESS NOTES
[facility]:   [facility] Contact  [facility]/Voicemail     Clinical Data: Post-Discharge Outreach     Outreach attempted x 2.  Left message on patient's voicemail, providing [facility]'s central phone number of [phone] ([phone]) for questions/concerns and/or to schedule an appt with an [facility] provider, if they do not have a PCP.      Plan:  [facility] will do no further outreaches at this time.       Jennifer Husain  Community Health Worker  [facility], [facility]  [phone]      *Connected Care Resource Team does NOT follow patient ongoing. Referrals are identified based on internal discharge reports and the outreach is to ensure patient has an understanding of their discharge instructions.

## 2025-07-29 ENCOUNTER — TELEPHONE (OUTPATIENT)
Dept: NEUROSURGERY | Facility: CLINIC | Age: 55
End: 2025-07-29
Payer: COMMERCIAL

## 2025-07-29 NOTE — TELEPHONE ENCOUNTER
Attempted to reach patient to remind them about appointment scheduled with Sourav May PA-C on 7/30/25 in our [place] clinic.    A voicemail was left with a call back number if the patient has questions or would like to reschedule.

## 2025-07-30 ENCOUNTER — OFFICE VISIT (OUTPATIENT)
Dept: NEUROSURGERY | Facility: CLINIC | Age: 55
End: 2025-07-30
Attending: PHYSICIAN ASSISTANT
Payer: COMMERCIAL

## 2025-07-30 VITALS
BODY MASS INDEX: 32.76 KG/M2 | WEIGHT: 178 LBS | DIASTOLIC BLOOD PRESSURE: 81 MMHG | HEART RATE: 89 BPM | OXYGEN SATURATION: 100 % | HEIGHT: 62 IN | SYSTOLIC BLOOD PRESSURE: 138 MMHG

## 2025-07-30 DIAGNOSIS — Z98.890 S/P LUMBAR DISCECTOMY: ICD-10-CM

## 2025-07-30 DIAGNOSIS — Z98.890 S/P SPINAL SURGERY: ICD-10-CM

## 2025-07-30 DIAGNOSIS — Z98.890 S/P LUMBAR LAMINECTOMY: Primary | ICD-10-CM

## 2025-07-30 PROCEDURE — 3075F SYST BP GE 130 - 139MM HG: CPT | Performed by: PHYSICIAN ASSISTANT

## 2025-07-30 PROCEDURE — 99211 OFF/OP EST MAY X REQ PHY/QHP: CPT | Performed by: PHYSICIAN ASSISTANT

## 2025-07-30 PROCEDURE — 1125F AMNT PAIN NOTED PAIN PRSNT: CPT | Performed by: PHYSICIAN ASSISTANT

## 2025-07-30 PROCEDURE — 3079F DIAST BP 80-89 MM HG: CPT | Performed by: PHYSICIAN ASSISTANT

## 2025-07-30 PROCEDURE — 99024 POSTOP FOLLOW-UP VISIT: CPT | Performed by: PHYSICIAN ASSISTANT

## 2025-07-30 RX ORDER — HYDROMORPHONE HYDROCHLORIDE 2 MG/1
2 TABLET ORAL EVERY 6 HOURS PRN
Qty: 30 TABLET | Refills: 0 | Status: SHIPPED | OUTPATIENT
Start: 2025-07-30

## 2025-07-30 RX ORDER — METHYLPREDNISOLONE 4 MG/1
TABLET ORAL
Qty: 21 TABLET | Refills: 0 | Status: SHIPPED | OUTPATIENT
Start: 2025-07-30

## 2025-07-30 ASSESSMENT — PAIN SCALES - GENERAL: PAINLEVEL_OUTOF10: MODERATE PAIN (6)

## 2025-07-30 NOTE — PROGRESS NOTES
NEUROSURGERY CLINIC PROGRESS NOTE    DATE OF VISIT: 7/30/2025    HPI:     Kacy Herrera is a pleasant 55 year old female who presents to the clinic today for a one-week post-operative follow-up visit. On 07/21/2025 the patient underwent a redo lumbar 5 to sacral 1 right microdiscectomy with Dr. Atkinson. Per chart review, the patient's pre-operative symptoms consisted of a burning pain and associated weakness in her right dorsiflexor.  Today, the patient reports that she continues to have some right lower extremity burning pain as well as some new right sided low back pain. She was hoping to feel better at this point. She rates her pain at 7/10.       Current Outpatient Medications   Medication Sig Dispense Refill    acetaminophen (TYLENOL) 325 MG tablet Take 3 tablets (975 mg) by mouth every 8 hours. 90 tablet 0    HYDROmorphone (DILAUDID) 2 MG tablet Take 1 tablet (2 mg) by mouth every 6 hours as needed for pain. 30 tablet 0    HYDROmorphone (DILAUDID) 2 MG tablet Take 1-2 tablets (2-4 mg) by mouth every 4 hours as needed for moderate to severe pain. 40 tablet 0    ibuprofen (ADVIL/MOTRIN) 600 MG tablet Take 600 mg by mouth every 8 hours as needed for moderate pain.      levonorgestrel (MIRENA) 52 MG (20 mcg/day) IUD 1 each by Intrauterine route.      methocarbamol (ROBAXIN) 500 MG tablet Take 1 tablet (500 mg) by mouth every 6 hours. 40 tablet 0    methylPREDNISolone (MEDROL DOSEPAK) 4 MG tablet therapy pack Follow Package Directions 21 tablet 0    ondansetron (ZOFRAN) 4 MG tablet Take 1 tablet (4 mg) by mouth every 6 hours as needed for nausea. 12 tablet 0    polyethylene glycol (MIRALAX) 17 g packet Take 1 packet by mouth daily.      predniSONE (DELTASONE) 20 MG tablet Take 20 mg by mouth 2 times daily.      pregabalin (LYRICA) 25 MG capsule Take 1 capsule (25 mg) by mouth 2 times daily. 60 capsule 0    senna-docusate (SENOKOT-S/PERICOLACE) 8.6-50 MG tablet Take 1 tablet by mouth 2 times daily. 60 tablet 0     "venlafaxine (EFFEXOR-XR) 150 MG 24 hr capsule Take 150 mg by mouth every evening.       No current facility-administered medications for this visit.       Allergies   Allergen Reactions    Oxycodone Itching    Zithromax [Azithromycin] Hives       Past Medical History:   Diagnosis Date    Arthritis     knee left    Cholelithiasis     Depressive disorder     Left knee pain     Lumbar back pain with radiculopathy affecting right lower extremity     Other chronic pain     left knee    Overweight     Tobacco dependence     Vasomotor symptoms due to menopause        Review Of Systems    Skin: negative  Eyes: negative  Ears/Nose/Throat: negative  Respiratory: No shortness of breath, dyspnea on exertion, cough, or hemoptysis  Cardiovascular: negative  Gastrointestinal: negative  Musculoskeletal: back pain and muscular weakness  Neurologic: numbness or tingling of feet  Psychiatric: negative  Hematologic/Lymphatic/Immunologic: negative  Endocrine: negative    OBJECTIVE:    /81   Pulse 89   Ht 1.575 m (5' 2\")   Wt 80.7 kg (178 lb)   SpO2 100%   BMI 32.56 kg/m      Exam:    Patient appears comfortable and in no apparent distress. Moving all extremities.  Gait is non-antalgic.  CN II-XII grossly intact, alert and appropriate with conversation and following  commands  Bilateral upper extremities with full strength including hand intrinsics and grasp.  Sensation intact throughout.  Bilateral lower extremities 5/5 strength with the exception of left DF 4/5 and right DF 4/5.   Normal sensation throughout bilaterally.  Lumbar incision edges well approximated. Absent for edema, erythema, or drainage.    ASSESSMENT:    1. S/P lumbar laminectomy    2. S/P lumbar discectomy    3. S/P spinal surgery        PLAN:    Kacy Ramostad is one week out from a redo lumbar 5 to sacral 1 right microdiscectomy with Dr. Atkinson on 07/21/2025. Today the patient reports that she continues to have some right lower extremity burning pain as " well as some new right sided low back pain. She was hoping to feel better at this point. She rates her pain at 7/10.    The patient has remained compliant with the post-operative restrictions which included not lifting anything greater than 5-10 lbs. Today we discussed continuing those restrictions as well as avoiding excessive bending,  twisting, and turning at the waist and to avoid jostling and jarring activities.     We have provided her with a MDP and a refill of her dilaudid.     Ms. Herrera will return to the clinic in one week.    The patient gave verbal understanding and is in agreement with the above plan. She will call or return to the clinic for any worsening or changes in symptoms.      Respectfully,     MYA Oconnor, PA-C

## 2025-07-30 NOTE — NURSING NOTE
"Kacy Herrera is a 55 year old female who presents for:  Chief Complaint   Patient presents with    RECHECK     Close monitoring after surgery        Initial Vitals:  /81   Pulse 89   Ht 5' 2\" (1.575 m)   Wt 178 lb (80.7 kg)   SpO2 100%   BMI 32.56 kg/m   Estimated body mass index is 32.56 kg/m  as calculated from the following:    Height as of this encounter: 5' 2\" (1.575 m).    Weight as of this encounter: 178 lb (80.7 kg).. Body surface area is 1.88 meters squared. BP completed using cuff size: regular  Moderate Pain (6)        Zuri [place]  "

## 2025-07-30 NOTE — LETTER
7/30/2025      Kacy Herrera  [address] [place] [address]      Dear Colleague,    Thank you for referring your patient, Kacy Herrera, to the [facility] [place]. Please see a copy of my visit note below.    NEUROSURGERY CLINIC PROGRESS NOTE    DATE OF VISIT: 7/30/2025    HPI:     Kacy Herrera is a pleasant 55 year old female who presents to the clinic today for a one-week post-operative follow-up visit. On 07/21/2025 the patient underwent a redo lumbar 5 to sacral 1 right microdiscectomy with Dr. Atkinson. Per chart review, the patient's pre-operative symptoms consisted of a burning pain and associated weakness in her right dorsiflexor.  Today, the patient reports that she continues to have some right lower extremity burning pain as well as some new right sided low back pain. She was hoping to feel better at this point. She rates her pain at 7/10.       Current Outpatient Medications   Medication Sig Dispense Refill     acetaminophen (TYLENOL) 325 MG tablet Take 3 tablets (975 mg) by mouth every 8 hours. 90 tablet 0     HYDROmorphone (DILAUDID) 2 MG tablet Take 1 tablet (2 mg) by mouth every 6 hours as needed for pain. 30 tablet 0     HYDROmorphone (DILAUDID) 2 MG tablet Take 1-2 tablets (2-4 mg) by mouth every 4 hours as needed for moderate to severe pain. 40 tablet 0     ibuprofen (ADVIL/MOTRIN) 600 MG tablet Take 600 mg by mouth every 8 hours as needed for moderate pain.       levonorgestrel (MIRENA) 52 MG (20 mcg/day) IUD 1 each by Intrauterine route.       methocarbamol (ROBAXIN) 500 MG tablet Take 1 tablet (500 mg) by mouth every 6 hours. 40 tablet 0     methylPREDNISolone (MEDROL DOSEPAK) 4 MG tablet therapy pack Follow Package Directions 21 tablet 0     ondansetron (ZOFRAN) 4 MG tablet Take 1 tablet (4 mg) by mouth every 6 hours as needed for nausea. 12 tablet 0     polyethylene glycol (MIRALAX) 17 g packet Take 1 packet by mouth daily.        "predniSONE (DELTASONE) 20 MG tablet Take 20 mg by mouth 2 times daily.       pregabalin (LYRICA) 25 MG capsule Take 1 capsule (25 mg) by mouth 2 times daily. 60 capsule 0     senna-docusate (SENOKOT-S/PERICOLACE) 8.6-50 MG tablet Take 1 tablet by mouth 2 times daily. 60 tablet 0     venlafaxine (EFFEXOR-XR) 150 MG 24 hr capsule Take 150 mg by mouth every evening.       No current facility-administered medications for this visit.       Allergies   Allergen Reactions     Oxycodone Itching     Zithromax [Azithromycin] Hives       Past Medical History:   Diagnosis Date     Arthritis     knee left     Cholelithiasis      Depressive disorder      Left knee pain      Lumbar back pain with radiculopathy affecting right lower extremity      Other chronic pain     left knee     Overweight      Tobacco dependence      Vasomotor symptoms due to menopause        Review Of Systems    Skin: negative  Eyes: negative  Ears/Nose/Throat: negative  Respiratory: No shortness of breath, dyspnea on exertion, cough, or hemoptysis  Cardiovascular: negative  Gastrointestinal: negative  Musculoskeletal: back pain and muscular weakness  Neurologic: numbness or tingling of feet  Psychiatric: negative  Hematologic/Lymphatic/Immunologic: negative  Endocrine: negative    OBJECTIVE:    /81   Pulse 89   Ht 1.575 m (5' 2\")   Wt 80.7 kg (178 lb)   SpO2 100%   BMI 32.56 kg/m      Exam:    Patient appears comfortable and in no apparent distress. Moving all extremities.  Gait is non-antalgic.  CN II-XII grossly intact, alert and appropriate with conversation and following  commands  Bilateral upper extremities with full strength including hand intrinsics and grasp.  Sensation intact throughout.  Bilateral lower extremities 5/5 strength with the exception of left DF 4/5 and right DF 4/5.   Normal sensation throughout bilaterally.  Lumbar incision edges well approximated. Absent for edema, erythema, or drainage.    ASSESSMENT:    1. S/P lumbar " laminectomy    2. S/P lumbar discectomy    3. S/P spinal surgery        PLAN:    Kacy Herrera is one week out from a redo lumbar 5 to sacral 1 right microdiscectomy with Dr. Atkinson on 07/21/2025. Today the patient reports that she continues to have some right lower extremity burning pain as well as some new right sided low back pain. She was hoping to feel better at this point. She rates her pain at 7/10.    The patient has remained compliant with the post-operative restrictions which included not lifting anything greater than 5-10 lbs. Today we discussed continuing those restrictions as well as avoiding excessive bending,  twisting, and turning at the waist and to avoid jostling and jarring activities.     We have provided her with a MDP and a refill of her dilaudid.     Ms. Herrera will return to the clinic in one week.    The patient gave verbal understanding and is in agreement with the above plan. She will call or return to the clinic for any worsening or changes in symptoms.      Respectfully,     MYA Oconnor PA-C    Again, thank you for allowing me to participate in the care of your patient.        Sincerely,        Sourav May PA-C    Electronically signed

## 2025-08-06 ENCOUNTER — ALLIED HEALTH/NURSE VISIT (OUTPATIENT)
Dept: NEUROSURGERY | Facility: CLINIC | Age: 55
End: 2025-08-06
Payer: COMMERCIAL

## 2025-08-06 VITALS — DIASTOLIC BLOOD PRESSURE: 88 MMHG | HEART RATE: 78 BPM | SYSTOLIC BLOOD PRESSURE: 130 MMHG | RESPIRATION RATE: 18 BRPM

## 2025-08-06 DIAGNOSIS — Z98.890 S/P SPINAL SURGERY: ICD-10-CM

## 2025-08-06 DIAGNOSIS — M54.16 LUMBAR RADICULOPATHY: Primary | ICD-10-CM

## 2025-08-06 PROCEDURE — 99024 POSTOP FOLLOW-UP VISIT: CPT

## 2025-08-06 PROCEDURE — 3075F SYST BP GE 130 - 139MM HG: CPT

## 2025-08-06 PROCEDURE — 3079F DIAST BP 80-89 MM HG: CPT

## 2025-08-07 ENCOUNTER — TELEPHONE (OUTPATIENT)
Dept: NEUROSURGERY | Facility: CLINIC | Age: 55
End: 2025-08-07
Payer: COMMERCIAL

## 2025-08-11 ENCOUNTER — HOSPITAL ENCOUNTER (OUTPATIENT)
Dept: MRI IMAGING | Facility: CLINIC | Age: 55
Discharge: HOME | End: 2025-08-11
Attending: STUDENT IN AN ORGANIZED HEALTH CARE EDUCATION/TRAINING PROGRAM | Admitting: STUDENT IN AN ORGANIZED HEALTH CARE EDUCATION/TRAINING PROGRAM
Payer: COMMERCIAL

## 2025-08-11 ENCOUNTER — TELEPHONE (OUTPATIENT)
Dept: NEUROLOGY | Facility: CLINIC | Age: 55
End: 2025-08-11
Payer: COMMERCIAL

## 2025-08-11 DIAGNOSIS — Z98.890 S/P LUMBAR DISCECTOMY: ICD-10-CM

## 2025-08-11 DIAGNOSIS — M54.17 LUMBOSACRAL RADICULOPATHY: Primary | ICD-10-CM

## 2025-08-11 DIAGNOSIS — Z98.890 S/P LUMBAR LAMINECTOMY: ICD-10-CM

## 2025-08-11 DIAGNOSIS — Z98.890 S/P SPINAL SURGERY: ICD-10-CM

## 2025-08-11 DIAGNOSIS — M54.16 LUMBAR RADICULOPATHY: ICD-10-CM

## 2025-08-11 PROCEDURE — 255N000002 HC RX 255 OP 636: Performed by: STUDENT IN AN ORGANIZED HEALTH CARE EDUCATION/TRAINING PROGRAM

## 2025-08-11 PROCEDURE — A9585 GADOBUTROL INJECTION: HCPCS | Performed by: STUDENT IN AN ORGANIZED HEALTH CARE EDUCATION/TRAINING PROGRAM

## 2025-08-11 PROCEDURE — 72158 MRI LUMBAR SPINE W/O & W/DYE: CPT

## 2025-08-11 RX ORDER — HYDROMORPHONE HYDROCHLORIDE 2 MG/1
2 TABLET ORAL EVERY 6 HOURS PRN
Qty: 30 TABLET | Refills: 0 | Status: SHIPPED | OUTPATIENT
Start: 2025-08-11

## 2025-08-11 RX ORDER — GADOBUTROL 604.72 MG/ML
8 INJECTION INTRAVENOUS ONCE
Status: COMPLETED | OUTPATIENT
Start: 2025-08-11 | End: 2025-08-11

## 2025-08-11 RX ADMIN — GADOBUTROL 8 ML: 604.72 INJECTION INTRAVENOUS at 06:50

## 2025-08-12 ENCOUNTER — TELEPHONE (OUTPATIENT)
Dept: NEUROSURGERY | Facility: CLINIC | Age: 55
End: 2025-08-12
Payer: COMMERCIAL

## 2025-08-20 ENCOUNTER — TRANSFERRED RECORDS (OUTPATIENT)
Dept: MULTI SPECIALTY CLINIC | Facility: CLINIC | Age: 55
End: 2025-08-20
Payer: COMMERCIAL

## 2025-08-20 DIAGNOSIS — Z98.890 S/P LUMBAR LAMINECTOMY: Primary | ICD-10-CM

## 2025-08-20 LAB
ALT SERPL-CCNC: 27 U/L (ref 4–35)
AST SERPL-CCNC: 26 U/L (ref 12–35)
CREATININE (EXTERNAL): 0.8 MG/DL (ref 0.5–1.5)
GLUCOSE (EXTERNAL): 97 MG/DL (ref 60–115)
POTASSIUM (EXTERNAL): 4.6 MMOL/L (ref 3.6–5.1)

## 2025-08-20 RX ORDER — CHLORHEXIDINE GLUCONATE 40 MG/ML
SOLUTION TOPICAL
Qty: 118 ML | Refills: 0 | Status: SHIPPED | OUTPATIENT
Start: 2025-08-20

## 2025-08-21 ENCOUNTER — TELEPHONE (OUTPATIENT)
Dept: NEUROSURGERY | Facility: CLINIC | Age: 55
End: 2025-08-21
Payer: COMMERCIAL

## 2025-08-21 ENCOUNTER — ANCILLARY PROCEDURE (OUTPATIENT)
Dept: CT IMAGING | Facility: CLINIC | Age: 55
End: 2025-08-21
Attending: NURSE PRACTITIONER
Payer: COMMERCIAL

## 2025-08-21 DIAGNOSIS — M54.17 LUMBOSACRAL RADICULOPATHY: Primary | ICD-10-CM

## 2025-08-21 DIAGNOSIS — M54.16 LUMBAR RADICULOPATHY: Primary | ICD-10-CM

## 2025-08-22 ENCOUNTER — APPOINTMENT (OUTPATIENT)
Dept: GENERAL RADIOLOGY | Facility: CLINIC | Age: 55
End: 2025-08-22
Attending: STUDENT IN AN ORGANIZED HEALTH CARE EDUCATION/TRAINING PROGRAM
Payer: COMMERCIAL

## 2025-08-22 ENCOUNTER — HOSPITAL ENCOUNTER (INPATIENT)
Facility: CLINIC | Age: 55
LOS: 2 days | Discharge: HOME | End: 2025-08-24
Attending: STUDENT IN AN ORGANIZED HEALTH CARE EDUCATION/TRAINING PROGRAM | Admitting: STUDENT IN AN ORGANIZED HEALTH CARE EDUCATION/TRAINING PROGRAM
Payer: COMMERCIAL

## 2025-08-22 PROBLEM — Z98.1 S/P LUMBAR FUSION: Status: ACTIVE | Noted: 2025-08-22

## 2025-08-22 ASSESSMENT — ACTIVITIES OF DAILY LIVING (ADL)
ADLS_ACUITY_SCORE: 35
ADLS_ACUITY_SCORE: 33
ADLS_ACUITY_SCORE: 57
ADLS_ACUITY_SCORE: 35
ADLS_ACUITY_SCORE: 33
ADLS_ACUITY_SCORE: 35
ADLS_ACUITY_SCORE: 33
ADLS_ACUITY_SCORE: 31
ADLS_ACUITY_SCORE: 33
ADLS_ACUITY_SCORE: 33

## 2025-08-23 ENCOUNTER — APPOINTMENT (OUTPATIENT)
Dept: PHYSICAL THERAPY | Facility: CLINIC | Age: 55
End: 2025-08-23
Attending: STUDENT IN AN ORGANIZED HEALTH CARE EDUCATION/TRAINING PROGRAM
Payer: COMMERCIAL

## 2025-08-23 ENCOUNTER — APPOINTMENT (OUTPATIENT)
Dept: GENERAL RADIOLOGY | Facility: CLINIC | Age: 55
End: 2025-08-23
Payer: COMMERCIAL

## 2025-08-23 ASSESSMENT — ACTIVITIES OF DAILY LIVING (ADL)
ADLS_ACUITY_SCORE: 35
ADLS_ACUITY_SCORE: 36
ADLS_ACUITY_SCORE: 36
ADLS_ACUITY_SCORE: 35
ADLS_ACUITY_SCORE: 36
ADLS_ACUITY_SCORE: 35

## 2025-08-24 ENCOUNTER — APPOINTMENT (OUTPATIENT)
Dept: PHYSICAL THERAPY | Facility: CLINIC | Age: 55
End: 2025-08-24
Attending: STUDENT IN AN ORGANIZED HEALTH CARE EDUCATION/TRAINING PROGRAM
Payer: COMMERCIAL

## 2025-08-24 ASSESSMENT — ACTIVITIES OF DAILY LIVING (ADL)
ADLS_ACUITY_SCORE: 35

## 2025-08-26 ENCOUNTER — PATIENT OUTREACH (OUTPATIENT)
Dept: CARE COORDINATION | Facility: CLINIC | Age: 55
End: 2025-08-26
Payer: COMMERCIAL

## 2025-09-03 ENCOUNTER — ALLIED HEALTH/NURSE VISIT (OUTPATIENT)
Dept: NEUROSURGERY | Facility: CLINIC | Age: 55
End: 2025-09-03
Payer: COMMERCIAL

## 2025-09-03 ENCOUNTER — MYC REFILL (OUTPATIENT)
Dept: NEUROSURGERY | Facility: CLINIC | Age: 55
End: 2025-09-03

## 2025-09-03 DIAGNOSIS — Z98.1 S/P LUMBAR FUSION: Primary | ICD-10-CM

## 2025-09-03 DIAGNOSIS — M54.16 LUMBAR RADICULOPATHY: ICD-10-CM

## 2025-09-03 RX ORDER — METHYLPREDNISOLONE 4 MG/1
TABLET ORAL
Qty: 21 TABLET | Refills: 0 | Status: SHIPPED | OUTPATIENT
Start: 2025-09-03

## 2025-09-03 RX ORDER — ONDANSETRON 4 MG/1
4 TABLET, FILM COATED ORAL EVERY 6 HOURS PRN
Qty: 20 TABLET | Refills: 0 | Status: SHIPPED | OUTPATIENT
Start: 2025-09-03

## 2025-09-03 RX ORDER — HYDROCODONE BITARTRATE AND ACETAMINOPHEN 5; 325 MG/1; MG/1
1 TABLET ORAL EVERY 6 HOURS PRN
Qty: 40 TABLET | Refills: 0 | Status: SHIPPED | OUTPATIENT
Start: 2025-09-03

## (undated) DEVICE — Device

## (undated) DEVICE — DRAPE MICROSCOPE LEICA 54X150" AR8033650

## (undated) DEVICE — SU ETHILON 2-0 FS 18" 664G

## (undated) DEVICE — CLIP APPLIER ENDO 5MM M/L LIGAMAX EL5ML

## (undated) DEVICE — LINEN TOWEL PACK X5 5464

## (undated) DEVICE — WRAP THRP UNV LMBR GEL SFT SUB-2

## (undated) DEVICE — SU VICRYL 4-0 PS-2 18" UND J496H

## (undated) DEVICE — SPONGE COTTONOID 1X3" 80-1408

## (undated) DEVICE — SU VICRYL+ 0 8-18 CT1/CR UND VCP840D

## (undated) DEVICE — SET HANDPIECE INTERPULSE W/COAXIAL FAN SPRAY TIP 0210118000

## (undated) DEVICE — SOL NACL 0.9% INJ 250ML BAG 2B1322Q

## (undated) DEVICE — CAST PADDING 6" STERILE 9046S

## (undated) DEVICE — SOL WATER IRRIG 1000ML BOTTLE 2F7114

## (undated) DEVICE — CLOSURE SYS SKIN PREMIERPRO EXOFIN FUSION 4X22CM STRL 3472

## (undated) DEVICE — BONE CEMENT MIXEVAC III HI VAC KIT  0206-015-000

## (undated) DEVICE — SOL ISOPROPYL RUBBING ALCOHOL USP 70% 4OZ HDX-20 I0020

## (undated) DEVICE — ESU GROUND PAD ADULT REM W/15' CORD E7507DB

## (undated) DEVICE — SU MONOCRYL 4-0 PS-2 18" UND Y496G

## (undated) DEVICE — PACK SPINE SM CUSTOM SNE15SSFSK

## (undated) DEVICE — ADH LIQUID MASTISOL TOPICAL VIAL 2-3ML 0523-48

## (undated) DEVICE — DRAIN JACKSON PRATT 07MM FLAT SU130-1310

## (undated) DEVICE — ENDO TROCAR FIRST ENTRY KII FIOS Z-THRD 05X100MM CTF03

## (undated) DEVICE — SU STRATAFIX PDS PLUS 1 CT-1 12" SXPP1A443

## (undated) DEVICE — SYR EAR 3OZ BULB IRR STRL DISP BLU PVC 4173

## (undated) DEVICE — LINEN HALF SHEET 5512

## (undated) DEVICE — SOL NACL 0.9% IRRIG 1000ML BOTTLE 2F7124

## (undated) DEVICE — SU VICRYL 2-0 CT-2 CR 8X18" J726D

## (undated) DEVICE — LINEN POUCH DBL 5427

## (undated) DEVICE — DRAPE STERI TOWEL LG 1010

## (undated) DEVICE — ENDO TROCAR SLEEVE KII Z-THREADED 05X100MM CTS02

## (undated) DEVICE — SUCTION MANIFOLD NEPTUNE 2 SYS 4 PORT 0702-020-000

## (undated) DEVICE — GLOVE PROTEXIS W/NEU-THERA 6.5  2D73TE65

## (undated) DEVICE — SU VICRYL 0 CT-2 CR 8X18" J727D

## (undated) DEVICE — SURGICEL HEMOSTAT 2X14" 1951

## (undated) DEVICE — VIAL DECANTER STERILE WHITE DYNJDEC06

## (undated) DEVICE — LINEN FULL SHEET 5511

## (undated) DEVICE — SU VICRYL+ 1 MO-4 18" DYED VCP702D

## (undated) DEVICE — ENDO POUCH UNIV RETRIEVAL SYSTEM INZII 10MM CD001

## (undated) DEVICE — GLOVE PROTEXIS BLUE W/NEU-THERA 6.5  2D73EB65

## (undated) DEVICE — SUTURE MONOCRYL 3-0 18 PS2 UND MCP497G

## (undated) DEVICE — SU ETHILON 3-0 PS-2 18" 1669H

## (undated) DEVICE — ESU PENCIL W/HOLSTER E2350H

## (undated) DEVICE — TOOL DISSECT MIDAS MR8 14CM MATCH HEAD 3MM MR8-14MH30

## (undated) DEVICE — KIT TURNOVER FAIRVIEW SOUTHDALE FULL SP3889

## (undated) DEVICE — ESU CORD MONOPOLAR 10'  E0510

## (undated) DEVICE — ESU GROUND PAD UNIVERSAL W/O CORD

## (undated) DEVICE — RX SURGIFLO HEMOSTATIC MATRIX W/THROMBIN 8ML 2994

## (undated) DEVICE — GOWN XLG DISP 9545

## (undated) DEVICE — SYR 30ML LL W/O NDL 302832

## (undated) DEVICE — SPONGE COTTONOID 1/2X3" 80-1407

## (undated) DEVICE — PREP CHLORAPREP 26ML TINTED HI-LITE ORANGE 930815

## (undated) DEVICE — BLADE KNIFE SURG 11 371111

## (undated) DEVICE — ESU GROUND PAD ADULT W/CORD E7507

## (undated) DEVICE — SURGICEL HEMOSTAT 3X4" NUKNIT 1943

## (undated) DEVICE — POSITIONER ARM CRADLE LAMINECTOMY DISP

## (undated) DEVICE — DRAIN ROUND W/RESERV KIT JACKSON PRATT 10FR 400ML SU130-402D

## (undated) DEVICE — SU VICRYL 0 UR-6 27" J603H

## (undated) DEVICE — SOL NACL 0.9% IRRIG 3000ML BAG 2B7477

## (undated) DEVICE — TOURNIQUET SGL  BLADDER 30"X4" BLUE 5921030135

## (undated) DEVICE — HOOD FLYTE W/PEELAWAY 408-800-100

## (undated) DEVICE — GLOVE BIOGEL PI MICRO INDICATOR UNDERGLOVE SZ 8.0 48980

## (undated) DEVICE — LINEN ORTHO ACL PACK 5447

## (undated) DEVICE — CATH IV 14GA 2IN REM FLASHPLUG DEHP-FR PVC FR 4251717-02

## (undated) DEVICE — DRSG PRIMAPORE 03 1/8X6" 66000318

## (undated) DEVICE — DRAIN RESERVOIR 100ML JP 0070740

## (undated) DEVICE — BONE WAX 2.5GM W31G

## (undated) DEVICE — BAG CLEAR TRASH 1.3M 39X33" P4040C

## (undated) DEVICE — DRSG KERLIX FLUFFS X5

## (undated) DEVICE — RAD RX ISOVUE 300 (50ML) 61% IOPAMIDOL CHARGE PER ML

## (undated) DEVICE — GOWN IMPERVIOUS BREATHABLE SMART LG 89015

## (undated) DEVICE — SUTURE VICRYL+ 2-0 CT-2 CR 8X18" VCP726D

## (undated) DEVICE — LINEN TOWEL PACK X10 5473

## (undated) DEVICE — SPONGE SURGIFOAM 50

## (undated) DEVICE — TUBE CULTURE ANAEROBIC PORT-A-CUL 11ML

## (undated) DEVICE — ENDO TROCAR BLUNT TIP KII BALLOON 12X100MM C0R47

## (undated) DEVICE — ESU ELEC BLADE 2.75" COATED/INSULATED E1455

## (undated) DEVICE — SUCTION MANIFOLD NEPTUNE 2 SYS 1 PORT 702-025-000

## (undated) DEVICE — DRAPE C-ARM 60X42" 1013

## (undated) DEVICE — SYR 20ML LL W/O NDL 302830

## (undated) DEVICE — SPONGE COTTONOID 1/2X1/2" 80-1400

## (undated) DEVICE — SYR 03ML LL W/O NDL 309657

## (undated) DEVICE — PACK TOTAL KNEE BOXED LATEX FREE PO15TKFCT

## (undated) DEVICE — DRSG STERI STRIP 1/2X4" R1547

## (undated) DEVICE — NDL SPINAL 18GA 3.5" 405184

## (undated) DEVICE — DRAIN FLAT 10MM FULL PERF SIL 0070440

## (undated) DEVICE — POSITIONER PT PRONESAFE HEAD REST W/DERMAPROX INSERT 40599

## (undated) DEVICE — CUSTOM PACK LUMBAR FUSION SNE5BLFHEA

## (undated) DEVICE — SPONGE COTTONOID 1X1" 80-1403

## (undated) DEVICE — TUBING SUCTION MEDI-VAC 1/4"X20' N620A

## (undated) DEVICE — GLOVE BIOGEL PI ULTRATOUCH G SZ 8.0 42180

## (undated) DEVICE — TUBING SUCTION MEDI-VAC SOFT 3/16"X20' N520A

## (undated) DEVICE — GLOVE PROTEXIS POWDER FREE 7.5 ORTHOPEDIC 2D73ET75

## (undated) DEVICE — BAG DECANTER STERILE WHITE DYNJDEC09

## (undated) DEVICE — ESU PENCIL SMOKE EVAC W/ROCKER SWITCH 0703-047-000

## (undated) DEVICE — SUCTION CANISTER MEDIVAC LINER 3000ML W/LID 65651-530

## (undated) DEVICE — KIT SEALER DURASEAL EXACT SP HYDROGEL 5ML SGL USE 20-6520

## (undated) DEVICE — CATH TRAY FOLEY SURESTEP 16FR DRAIN BAG STATOCK A899916

## (undated) DEVICE — GLOVE UNDER INDICATOR PI SZ 8.5 LF 41685

## (undated) DEVICE — PACK ERCP CUSTOM RIDGES

## (undated) DEVICE — GLOVE PROTEXIS BLUE W/NEU-THERA 8.0  2D73EB80

## (undated) DEVICE — PSTNR FACE LRG F/PRONE PSTN 10/CA PFC-100

## (undated) DEVICE — SUTURE MONOCRYL+ 2-0 CT-1 36" UNDYED MCP945H

## (undated) DEVICE — SU STRATAFIX MONOCRYL 3-0 SPIRAL PS-1 45CM SXMP1B102

## (undated) DEVICE — SOL NACL 0.9% IRRIG 1000ML BOTTLE 07138-09

## (undated) DEVICE — DECANTER BAG 2002S

## (undated) DEVICE — DRAPE MAYO STAND 23X54 8337

## (undated) DEVICE — GLOVE PROTEXIS POWDER FREE SMT 8.0  2D72PT80X

## (undated) DEVICE — SUCTION IRR STRYKERFLOW II W/TIP 250-070-520

## (undated) DEVICE — SUCTION LAPAROSCOPIC SMOKE EVAC SYSTEM SEL7000A

## (undated) RX ORDER — FENTANYL CITRATE 0.05 MG/ML
INJECTION, SOLUTION INTRAMUSCULAR; INTRAVENOUS
Status: DISPENSED
Start: 2025-07-21

## (undated) RX ORDER — CEFAZOLIN SODIUM/WATER 2 G/20 ML
SYRINGE (ML) INTRAVENOUS
Status: DISPENSED
Start: 2022-01-04

## (undated) RX ORDER — GLYCOPYRROLATE 0.2 MG/ML
INJECTION INTRAMUSCULAR; INTRAVENOUS
Status: DISPENSED
Start: 2022-01-04

## (undated) RX ORDER — EPHEDRINE SULFATE 50 MG/ML
INJECTION, SOLUTION INTRAMUSCULAR; INTRAVENOUS; SUBCUTANEOUS
Status: DISPENSED
Start: 2020-04-24

## (undated) RX ORDER — LABETALOL HYDROCHLORIDE 5 MG/ML
INJECTION, SOLUTION INTRAVENOUS
Status: DISPENSED
Start: 2025-07-21

## (undated) RX ORDER — DIPHENHYDRAMINE HCL 25 MG
CAPSULE ORAL
Status: DISPENSED
Start: 2022-01-04

## (undated) RX ORDER — CEFAZOLIN SODIUM 1 G/3ML
INJECTION, POWDER, FOR SOLUTION INTRAMUSCULAR; INTRAVENOUS
Status: DISPENSED
Start: 2025-07-03

## (undated) RX ORDER — FENTANYL CITRATE 50 UG/ML
INJECTION, SOLUTION INTRAMUSCULAR; INTRAVENOUS
Status: DISPENSED
Start: 2022-01-04

## (undated) RX ORDER — HYDROMORPHONE HYDROCHLORIDE 1 MG/ML
INJECTION, SOLUTION INTRAMUSCULAR; INTRAVENOUS; SUBCUTANEOUS
Status: DISPENSED
Start: 2020-04-24

## (undated) RX ORDER — DEXAMETHASONE SODIUM PHOSPHATE 4 MG/ML
INJECTION, SOLUTION INTRA-ARTICULAR; INTRALESIONAL; INTRAMUSCULAR; INTRAVENOUS; SOFT TISSUE
Status: DISPENSED
Start: 2025-07-21

## (undated) RX ORDER — DEXAMETHASONE SODIUM PHOSPHATE 10 MG/ML
INJECTION, SOLUTION INTRAMUSCULAR; INTRAVENOUS
Status: DISPENSED
Start: 2025-07-03

## (undated) RX ORDER — OXYCODONE HYDROCHLORIDE 5 MG/1
TABLET ORAL
Status: DISPENSED
Start: 2020-04-24

## (undated) RX ORDER — FENTANYL CITRATE 50 UG/ML
INJECTION, SOLUTION INTRAMUSCULAR; INTRAVENOUS
Status: DISPENSED
Start: 2020-04-24

## (undated) RX ORDER — HYDRALAZINE HYDROCHLORIDE 20 MG/ML
INJECTION INTRAMUSCULAR; INTRAVENOUS
Status: DISPENSED
Start: 2022-01-04

## (undated) RX ORDER — ONDANSETRON 2 MG/ML
INJECTION INTRAMUSCULAR; INTRAVENOUS
Status: DISPENSED
Start: 2025-07-03

## (undated) RX ORDER — ONDANSETRON 2 MG/ML
INJECTION INTRAMUSCULAR; INTRAVENOUS
Status: DISPENSED
Start: 2025-07-21

## (undated) RX ORDER — GABAPENTIN 300 MG/1
CAPSULE ORAL
Status: DISPENSED
Start: 2025-07-21

## (undated) RX ORDER — NEOSTIGMINE METHYLSULFATE 1 MG/ML
VIAL (ML) INJECTION
Status: DISPENSED
Start: 2022-01-04

## (undated) RX ORDER — CELECOXIB 200 MG/1
CAPSULE ORAL
Status: DISPENSED
Start: 2022-01-04

## (undated) RX ORDER — LIDOCAINE HYDROCHLORIDE 10 MG/ML
INJECTION, SOLUTION EPIDURAL; INFILTRATION; INTRACAUDAL; PERINEURAL
Status: DISPENSED
Start: 2022-01-04

## (undated) RX ORDER — KETOROLAC TROMETHAMINE 30 MG/ML
INJECTION, SOLUTION INTRAMUSCULAR; INTRAVENOUS
Status: DISPENSED
Start: 2025-07-03

## (undated) RX ORDER — IBUPROFEN 600 MG/1
TABLET, FILM COATED ORAL
Status: DISPENSED
Start: 2020-04-24

## (undated) RX ORDER — CEFAZOLIN SODIUM 2 G/100ML
INJECTION, SOLUTION INTRAVENOUS
Status: DISPENSED
Start: 2020-04-24

## (undated) RX ORDER — GLYCOPYRROLATE 0.2 MG/ML
INJECTION, SOLUTION INTRAMUSCULAR; INTRAVENOUS
Status: DISPENSED
Start: 2025-07-21

## (undated) RX ORDER — BUPIVACAINE HYDROCHLORIDE AND EPINEPHRINE 5; 5 MG/ML; UG/ML
INJECTION, SOLUTION EPIDURAL; INTRACAUDAL; PERINEURAL
Status: DISPENSED
Start: 2025-07-21

## (undated) RX ORDER — GLYCOPYRROLATE 0.2 MG/ML
INJECTION INTRAMUSCULAR; INTRAVENOUS
Status: DISPENSED
Start: 2020-04-24

## (undated) RX ORDER — ONDANSETRON 2 MG/ML
INJECTION INTRAMUSCULAR; INTRAVENOUS
Status: DISPENSED
Start: 2022-01-04

## (undated) RX ORDER — PROPOFOL 10 MG/ML
INJECTION, EMULSION INTRAVENOUS
Status: DISPENSED
Start: 2025-07-03

## (undated) RX ORDER — HYDROMORPHONE HCL IN WATER/PF 6 MG/30 ML
PATIENT CONTROLLED ANALGESIA SYRINGE INTRAVENOUS
Status: DISPENSED
Start: 2025-07-21

## (undated) RX ORDER — OXYCODONE HYDROCHLORIDE 5 MG/1
TABLET ORAL
Status: DISPENSED
Start: 2022-01-04

## (undated) RX ORDER — FENTANYL CITRATE 50 UG/ML
INJECTION, SOLUTION INTRAMUSCULAR; INTRAVENOUS
Status: DISPENSED
Start: 2025-07-03

## (undated) RX ORDER — ACETAMINOPHEN 325 MG/1
TABLET ORAL
Status: DISPENSED
Start: 2025-07-21

## (undated) RX ORDER — PROPOFOL 10 MG/ML
INJECTION, EMULSION INTRAVENOUS
Status: DISPENSED
Start: 2022-01-04

## (undated) RX ORDER — HYDROMORPHONE HCL IN WATER/PF 6 MG/30 ML
PATIENT CONTROLLED ANALGESIA SYRINGE INTRAVENOUS
Status: DISPENSED
Start: 2022-01-04

## (undated) RX ORDER — FENTANYL CITRATE 50 UG/ML
INJECTION, SOLUTION INTRAMUSCULAR; INTRAVENOUS
Status: DISPENSED
Start: 2025-07-21

## (undated) RX ORDER — BUPIVACAINE HCL/EPINEPHRINE 0.25-.0005
VIAL (ML) INJECTION
Status: DISPENSED
Start: 2025-07-03

## (undated) RX ORDER — DEXAMETHASONE SODIUM PHOSPHATE 4 MG/ML
INJECTION, SOLUTION INTRA-ARTICULAR; INTRALESIONAL; INTRAMUSCULAR; INTRAVENOUS; SOFT TISSUE
Status: DISPENSED
Start: 2022-01-04

## (undated) RX ORDER — METOPROLOL TARTRATE 1 MG/ML
INJECTION, SOLUTION INTRAVENOUS
Status: DISPENSED
Start: 2022-01-04

## (undated) RX ORDER — LIDOCAINE HYDROCHLORIDE 10 MG/ML
INJECTION, SOLUTION EPIDURAL; INFILTRATION; INTRACAUDAL; PERINEURAL
Status: DISPENSED
Start: 2025-07-03

## (undated) RX ORDER — TRANEXAMIC ACID 650 MG/1
TABLET ORAL
Status: DISPENSED
Start: 2022-01-04

## (undated) RX ORDER — BUPIVACAINE HYDROCHLORIDE 5 MG/ML
INJECTION, SOLUTION EPIDURAL; INTRACAUDAL
Status: DISPENSED
Start: 2020-04-24

## (undated) RX ORDER — NEOSTIGMINE METHYLSULFATE 1 MG/ML
VIAL (ML) INJECTION
Status: DISPENSED
Start: 2020-04-24

## (undated) RX ORDER — LIDOCAINE HYDROCHLORIDE 10 MG/ML
INJECTION, SOLUTION EPIDURAL; INFILTRATION; INTRACAUDAL; PERINEURAL
Status: DISPENSED
Start: 2020-04-24

## (undated) RX ORDER — ONDANSETRON 2 MG/ML
INJECTION INTRAMUSCULAR; INTRAVENOUS
Status: DISPENSED
Start: 2020-04-24

## (undated) RX ORDER — METHYLPREDNISOLONE ACETATE 40 MG/ML
INJECTION, SUSPENSION INTRA-ARTICULAR; INTRALESIONAL; INTRAMUSCULAR; SOFT TISSUE
Status: DISPENSED
Start: 2025-07-21

## (undated) RX ORDER — VANCOMYCIN HYDROCHLORIDE 1 G/20ML
INJECTION, POWDER, LYOPHILIZED, FOR SOLUTION INTRAVENOUS
Status: DISPENSED
Start: 2022-01-04